# Patient Record
Sex: FEMALE | Race: BLACK OR AFRICAN AMERICAN | Employment: UNEMPLOYED | URBAN - METROPOLITAN AREA
[De-identification: names, ages, dates, MRNs, and addresses within clinical notes are randomized per-mention and may not be internally consistent; named-entity substitution may affect disease eponyms.]

---

## 2017-05-08 ENCOUNTER — HOSPITAL ENCOUNTER (OUTPATIENT)
Age: 37
Setting detail: OBSERVATION
Discharge: HOME | End: 2017-05-09
Attending: STUDENT IN AN ORGANIZED HEALTH CARE EDUCATION/TRAINING PROGRAM | Admitting: FAMILY MEDICINE
Payer: MEDICARE

## 2017-05-08 DIAGNOSIS — M79.10 MYALGIA: ICD-10-CM

## 2017-05-08 DIAGNOSIS — E83.51 HYPOCALCEMIA: Primary | ICD-10-CM

## 2017-05-08 LAB
ALBUMIN SERPL BCP-MCNC: 3 G/DL (ref 3.5–5)
ALBUMIN/GLOB SERPL: 0.6 {RATIO} (ref 1.1–2.2)
ALP SERPL-CCNC: 78 U/L (ref 45–117)
ALT SERPL-CCNC: 44 U/L (ref 12–78)
ANION GAP BLD CALC-SCNC: 14 MMOL/L (ref 5–15)
ANION GAP BLD CALC-SCNC: 15 MMOL/L (ref 5–15)
ARTERIAL PATENCY WRIST A: ABNORMAL
AST SERPL W P-5'-P-CCNC: 68 U/L (ref 15–37)
ATRIAL RATE: 99 BPM
BASE EXCESS BLD CALC-SCNC: 5 MMOL/L
BASOPHILS # BLD AUTO: 0 K/UL (ref 0–0.1)
BASOPHILS # BLD: 1 % (ref 0–1)
BDY SITE: ABNORMAL
BILIRUB SERPL-MCNC: 0.3 MG/DL (ref 0.2–1)
BUN SERPL-MCNC: 57 MG/DL (ref 6–20)
BUN SERPL-MCNC: 63 MG/DL (ref 6–20)
BUN/CREAT SERPL: 4 (ref 12–20)
BUN/CREAT SERPL: 4 (ref 12–20)
CA-I BLD-SCNC: 0.71 MMOL/L (ref 1.12–1.32)
CALCIUM SERPL-MCNC: 6.4 MG/DL (ref 8.5–10.1)
CALCIUM SERPL-MCNC: 6.5 MG/DL (ref 8.5–10.1)
CALCULATED P AXIS, ECG09: 40 DEGREES
CALCULATED R AXIS, ECG10: 29 DEGREES
CALCULATED T AXIS, ECG11: 20 DEGREES
CHLORIDE SERPL-SCNC: 93 MMOL/L (ref 97–108)
CHLORIDE SERPL-SCNC: 96 MMOL/L (ref 97–108)
CK MB CFR SERPL CALC: 0.1 % (ref 0–2.5)
CK MB SERPL-MCNC: 1.9 NG/ML (ref 5–25)
CK SERPL-CCNC: 1600 U/L (ref 26–192)
CK SERPL-CCNC: 1692 U/L (ref 26–192)
CO2 SERPL-SCNC: 26 MMOL/L (ref 21–32)
CO2 SERPL-SCNC: 27 MMOL/L (ref 21–32)
CREAT SERPL-MCNC: 15.8 MG/DL (ref 0.55–1.02)
CREAT SERPL-MCNC: 16.9 MG/DL (ref 0.55–1.02)
DIAGNOSIS, 93000: NORMAL
EOSINOPHIL # BLD: 0.3 K/UL (ref 0–0.4)
EOSINOPHIL NFR BLD: 6 % (ref 0–7)
ERYTHROCYTE [DISTWIDTH] IN BLOOD BY AUTOMATED COUNT: 17.8 % (ref 11.5–14.5)
GAS FLOW.O2 O2 DELIVERY SYS: ABNORMAL L/MIN
GLOBULIN SER CALC-MCNC: 5.1 G/DL (ref 2–4)
GLUCOSE SERPL-MCNC: 82 MG/DL (ref 65–100)
GLUCOSE SERPL-MCNC: 88 MG/DL (ref 65–100)
HCO3 BLD-SCNC: 31 MMOL/L (ref 22–26)
HCT VFR BLD AUTO: 27 % (ref 35–47)
HGB BLD-MCNC: 8.5 G/DL (ref 11.5–16)
INR PPP: 1.4 (ref 0.9–1.1)
LYMPHOCYTES # BLD AUTO: 18 % (ref 12–49)
LYMPHOCYTES # BLD: 1 K/UL (ref 0.8–3.5)
MAGNESIUM SERPL-MCNC: 1.6 MG/DL (ref 1.6–2.4)
MCH RBC QN AUTO: 29.4 PG (ref 26–34)
MCHC RBC AUTO-ENTMCNC: 31.5 G/DL (ref 30–36.5)
MCV RBC AUTO: 93.4 FL (ref 80–99)
MONOCYTES # BLD: 0.4 K/UL (ref 0–1)
MONOCYTES NFR BLD AUTO: 7 % (ref 5–13)
NEUTS SEG # BLD: 3.7 K/UL (ref 1.8–8)
NEUTS SEG NFR BLD AUTO: 68 % (ref 32–75)
P-R INTERVAL, ECG05: 130 MS
PCO2 BLD: 55.9 MMHG (ref 35–45)
PH BLD: 7.35 [PH] (ref 7.35–7.45)
PHOSPHATE SERPL-MCNC: 8.1 MG/DL (ref 2.6–4.7)
PLATELET # BLD AUTO: 291 K/UL (ref 150–400)
PO2 BLD: 24 MMHG (ref 80–100)
POTASSIUM SERPL-SCNC: 4.3 MMOL/L (ref 3.5–5.1)
POTASSIUM SERPL-SCNC: 4.5 MMOL/L (ref 3.5–5.1)
PROT SERPL-MCNC: 8.1 G/DL (ref 6.4–8.2)
PROTHROMBIN TIME: 14.3 SEC (ref 9–11.1)
Q-T INTERVAL, ECG07: 394 MS
QRS DURATION, ECG06: 70 MS
QTC CALCULATION (BEZET), ECG08: 505 MS
RBC # BLD AUTO: 2.89 M/UL (ref 3.8–5.2)
SAO2 % BLD: 39 % (ref 92–97)
SODIUM SERPL-SCNC: 135 MMOL/L (ref 136–145)
SODIUM SERPL-SCNC: 136 MMOL/L (ref 136–145)
SPECIMEN TYPE: ABNORMAL
TROPONIN I SERPL-MCNC: <0.04 NG/ML
VENTRICULAR RATE, ECG03: 99 BPM
WBC # BLD AUTO: 5.4 K/UL (ref 3.6–11)

## 2017-05-08 PROCEDURE — 74011000258 HC RX REV CODE- 258: Performed by: PHYSICIAN ASSISTANT

## 2017-05-08 PROCEDURE — 83735 ASSAY OF MAGNESIUM: CPT | Performed by: PHYSICIAN ASSISTANT

## 2017-05-08 PROCEDURE — 74011250637 HC RX REV CODE- 250/637: Performed by: NURSE PRACTITIONER

## 2017-05-08 PROCEDURE — 90945 DIALYSIS ONE EVALUATION: CPT

## 2017-05-08 PROCEDURE — 96372 THER/PROPH/DIAG INJ SC/IM: CPT

## 2017-05-08 PROCEDURE — 96365 THER/PROPH/DIAG IV INF INIT: CPT

## 2017-05-08 PROCEDURE — 99218 HC RM OBSERVATION: CPT

## 2017-05-08 PROCEDURE — 96366 THER/PROPH/DIAG IV INF ADDON: CPT

## 2017-05-08 PROCEDURE — A4722 DIALYS SOL FLD VOL > 1999CC: HCPCS | Performed by: INTERNAL MEDICINE

## 2017-05-08 PROCEDURE — 93005 ELECTROCARDIOGRAM TRACING: CPT

## 2017-05-08 PROCEDURE — 99285 EMERGENCY DEPT VISIT HI MDM: CPT

## 2017-05-08 PROCEDURE — 82803 BLOOD GASES ANY COMBINATION: CPT

## 2017-05-08 PROCEDURE — 74011250636 HC RX REV CODE- 250/636: Performed by: NURSE PRACTITIONER

## 2017-05-08 PROCEDURE — 80048 BASIC METABOLIC PNL TOTAL CA: CPT | Performed by: NURSE PRACTITIONER

## 2017-05-08 PROCEDURE — 74011250637 HC RX REV CODE- 250/637: Performed by: INTERNAL MEDICINE

## 2017-05-08 PROCEDURE — 85025 COMPLETE CBC W/AUTO DIFF WBC: CPT | Performed by: STUDENT IN AN ORGANIZED HEALTH CARE EDUCATION/TRAINING PROGRAM

## 2017-05-08 PROCEDURE — 84100 ASSAY OF PHOSPHORUS: CPT | Performed by: PHYSICIAN ASSISTANT

## 2017-05-08 PROCEDURE — 74011250637 HC RX REV CODE- 250/637: Performed by: FAMILY MEDICINE

## 2017-05-08 PROCEDURE — 82550 ASSAY OF CK (CPK): CPT | Performed by: PHYSICIAN ASSISTANT

## 2017-05-08 PROCEDURE — 36415 COLL VENOUS BLD VENIPUNCTURE: CPT | Performed by: NURSE PRACTITIONER

## 2017-05-08 PROCEDURE — 85610 PROTHROMBIN TIME: CPT | Performed by: NURSE PRACTITIONER

## 2017-05-08 PROCEDURE — 80053 COMPREHEN METABOLIC PANEL: CPT | Performed by: PHYSICIAN ASSISTANT

## 2017-05-08 PROCEDURE — 84484 ASSAY OF TROPONIN QUANT: CPT | Performed by: NURSE PRACTITIONER

## 2017-05-08 PROCEDURE — 74011000258 HC RX REV CODE- 258: Performed by: NURSE PRACTITIONER

## 2017-05-08 PROCEDURE — 74011250636 HC RX REV CODE- 250/636: Performed by: PHYSICIAN ASSISTANT

## 2017-05-08 PROCEDURE — 74011250636 HC RX REV CODE- 250/636: Performed by: INTERNAL MEDICINE

## 2017-05-08 PROCEDURE — 96367 TX/PROPH/DG ADDL SEQ IV INF: CPT

## 2017-05-08 PROCEDURE — G0257 UNSCHED DIALYSIS ESRD PT HOS: HCPCS

## 2017-05-08 PROCEDURE — 74011250637 HC RX REV CODE- 250/637: Performed by: PHYSICIAN ASSISTANT

## 2017-05-08 RX ORDER — LEVETIRACETAM 500 MG/1
1000 TABLET ORAL
Status: DISCONTINUED | OUTPATIENT
Start: 2017-05-08 | End: 2017-05-09 | Stop reason: HOSPADM

## 2017-05-08 RX ORDER — SEVELAMER CARBONATE 800 MG/1
800 TABLET, FILM COATED ORAL 3 TIMES DAILY
Status: DISCONTINUED | OUTPATIENT
Start: 2017-05-08 | End: 2017-05-09 | Stop reason: HOSPADM

## 2017-05-08 RX ORDER — WARFARIN SODIUM 5 MG/1
10 TABLET ORAL ONCE
Status: COMPLETED | OUTPATIENT
Start: 2017-05-08 | End: 2017-05-08

## 2017-05-08 RX ORDER — OXYCODONE AND ACETAMINOPHEN 5; 325 MG/1; MG/1
2 TABLET ORAL
Status: COMPLETED | OUTPATIENT
Start: 2017-05-08 | End: 2017-05-08

## 2017-05-08 RX ORDER — GENTAMICIN SULFATE 1 MG/G
CREAM TOPICAL DAILY
Status: DISCONTINUED | OUTPATIENT
Start: 2017-05-08 | End: 2017-05-09 | Stop reason: HOSPADM

## 2017-05-08 RX ORDER — SODIUM CHLORIDE 0.9 % (FLUSH) 0.9 %
5-10 SYRINGE (ML) INJECTION AS NEEDED
Status: DISCONTINUED | OUTPATIENT
Start: 2017-05-08 | End: 2017-05-09 | Stop reason: HOSPADM

## 2017-05-08 RX ORDER — LEVETIRACETAM 500 MG/1
500 TABLET ORAL
COMMUNITY
End: 2017-08-09

## 2017-05-08 RX ORDER — MAGNESIUM SULFATE 1 G/100ML
1 INJECTION INTRAVENOUS ONCE
Status: COMPLETED | OUTPATIENT
Start: 2017-05-08 | End: 2017-05-08

## 2017-05-08 RX ORDER — LANOLIN ALCOHOL/MO/W.PET/CERES
250 CREAM (GRAM) TOPICAL DAILY
COMMUNITY

## 2017-05-08 RX ORDER — LEVETIRACETAM 1000 MG/1
1000 TABLET ORAL
COMMUNITY

## 2017-05-08 RX ORDER — ERGOCALCIFEROL 1.25 MG/1
50000 CAPSULE ORAL DAILY
COMMUNITY

## 2017-05-08 RX ORDER — POTASSIUM CHLORIDE 20 MEQ/1
20 TABLET, EXTENDED RELEASE ORAL DAILY
COMMUNITY

## 2017-05-08 RX ORDER — CALCIUM GLUCONATE 94 MG/ML
1 INJECTION, SOLUTION INTRAVENOUS
Status: DISCONTINUED | OUTPATIENT
Start: 2017-05-08 | End: 2017-05-08 | Stop reason: CLARIF

## 2017-05-08 RX ORDER — SEVELAMER CARBONATE 800 MG/1
1600 TABLET, FILM COATED ORAL 3 TIMES DAILY
COMMUNITY

## 2017-05-08 RX ORDER — CALCIUM CHLORIDE INJECTION 100 MG/ML
1 INJECTION, SOLUTION INTRAVENOUS
Status: DISCONTINUED | OUTPATIENT
Start: 2017-05-08 | End: 2017-05-08

## 2017-05-08 RX ORDER — WARFARIN 10 MG/1
10 TABLET ORAL
COMMUNITY
End: 2017-08-09

## 2017-05-08 RX ORDER — CALCITRIOL 0.25 UG/1
0.5 CAPSULE ORAL 2 TIMES DAILY
Status: DISCONTINUED | OUTPATIENT
Start: 2017-05-08 | End: 2017-05-09 | Stop reason: HOSPADM

## 2017-05-08 RX ORDER — SODIUM CHLORIDE 0.9 % (FLUSH) 0.9 %
5-10 SYRINGE (ML) INJECTION EVERY 8 HOURS
Status: DISCONTINUED | OUTPATIENT
Start: 2017-05-08 | End: 2017-05-09 | Stop reason: HOSPADM

## 2017-05-08 RX ORDER — CALCIUM CARBONATE 200(500)MG
200 TABLET,CHEWABLE ORAL
Status: DISCONTINUED | OUTPATIENT
Start: 2017-05-08 | End: 2017-05-09 | Stop reason: HOSPADM

## 2017-05-08 RX ORDER — LEVETIRACETAM 500 MG/1
500 TABLET ORAL
Status: DISCONTINUED | OUTPATIENT
Start: 2017-05-08 | End: 2017-05-09 | Stop reason: HOSPADM

## 2017-05-08 RX ORDER — WARFARIN 10 MG/1
15 TABLET ORAL
COMMUNITY
End: 2017-08-09

## 2017-05-08 RX ORDER — WARFARIN SODIUM 5 MG/1
10 TABLET ORAL ONCE
Status: DISCONTINUED | OUTPATIENT
Start: 2017-05-08 | End: 2017-05-08 | Stop reason: ALTCHOICE

## 2017-05-08 RX ADMIN — SEVELAMER CARBONATE 800 MG: 800 TABLET, FILM COATED ORAL at 22:06

## 2017-05-08 RX ADMIN — CALCIUM GLUCONATE 1 G: 94 INJECTION, SOLUTION INTRAVENOUS at 04:54

## 2017-05-08 RX ADMIN — SODIUM CHLORIDE, SODIUM LACTATE, CALCIUM CHLORIDE, MAGNESIUM CHLORIDE AND DEXTROSE 2000 ML: 2.5; 538; 448; 18.3; 5.08 INJECTION, SOLUTION INTRAPERITONEAL at 22:07

## 2017-05-08 RX ADMIN — Medication 10 ML: at 06:17

## 2017-05-08 RX ADMIN — LEVETIRACETAM 500 MG: 500 TABLET ORAL at 07:33

## 2017-05-08 RX ADMIN — SODIUM CHLORIDE, SODIUM LACTATE, CALCIUM CHLORIDE, MAGNESIUM CHLORIDE AND DEXTROSE 2000 ML: 2.5; 538; 448; 18.3; 5.08 INJECTION, SOLUTION INTRAPERITONEAL at 18:43

## 2017-05-08 RX ADMIN — WARFARIN SODIUM 10 MG: 5 TABLET ORAL at 20:56

## 2017-05-08 RX ADMIN — SEVELAMER CARBONATE 800 MG: 800 TABLET, FILM COATED ORAL at 17:09

## 2017-05-08 RX ADMIN — CALCIUM GLUCONATE 2 G: 94 INJECTION, SOLUTION INTRAVENOUS at 20:00

## 2017-05-08 RX ADMIN — SODIUM CHLORIDE, SODIUM LACTATE, CALCIUM CHLORIDE, MAGNESIUM CHLORIDE AND DEXTROSE 2000 ML: 2.5; 538; 448; 18.3; 5.08 INJECTION, SOLUTION INTRAPERITONEAL at 13:03

## 2017-05-08 RX ADMIN — Medication 10 ML: at 14:03

## 2017-05-08 RX ADMIN — EPOETIN ALFA 10000 UNITS: 10000 SOLUTION INTRAVENOUS; SUBCUTANEOUS at 11:17

## 2017-05-08 RX ADMIN — CALCITRIOL 0.5 MCG: 0.25 CAPSULE, LIQUID FILLED ORAL at 20:56

## 2017-05-08 RX ADMIN — CALCIUM CARBONATE (ANTACID) CHEW TAB 500 MG 200 MG: 500 CHEW TAB at 17:09

## 2017-05-08 RX ADMIN — Medication 10 ML: at 22:07

## 2017-05-08 RX ADMIN — CALCIUM CARBONATE (ANTACID) CHEW TAB 500 MG 200 MG: 500 CHEW TAB at 11:22

## 2017-05-08 RX ADMIN — OXYCODONE HYDROCHLORIDE AND ACETAMINOPHEN 2 TABLET: 5; 325 TABLET ORAL at 01:49

## 2017-05-08 RX ADMIN — CALCITRIOL 0.5 MCG: 0.25 CAPSULE, LIQUID FILLED ORAL at 14:04

## 2017-05-08 RX ADMIN — MAGNESIUM SULFATE HEPTAHYDRATE 1 G: 1 INJECTION, SOLUTION INTRAVENOUS at 14:02

## 2017-05-08 RX ADMIN — GENTAMICIN SULFATE: 1 CREAM TOPICAL at 14:00

## 2017-05-08 RX ADMIN — LEVETIRACETAM 1000 MG: 500 TABLET ORAL at 20:57

## 2017-05-08 NOTE — PROGRESS NOTES
Pharmacist Note  Warfarin Dosing  Consult provided for this 39 y. o.female to manage warfarin for history of DVT     INR Goal: 2 - 3    Home regimen/ tablet size: 15mg daily except for 10mg on tuesdays as reported by patient. Drugs that may increase INR: None  Drugs that may decrease INR: None  Other current anticoagulants/ drugs that may increase bleeding risk: None  Risk factors: None  Daily INR ordered: YES    Recent Labs      05/08/17   1239  05/08/17   0118   HGB   --   8.5*   INR  1.4*   --      Date               INR                  Dose  5/8  1.4                                                                                  Assessment/ Plan: Will order warfarin 10 mg PO x 1 dose. Note:  due to sub-therapeutic INR, there is question of if patient has been consistently taking warfarin. 10mg will allow anticoagulation and evaluation of future INR's with less risk for a supra-therapeutic INR / bleed. Discussed with IM pharmacy specialist.    Pharmacy will continue to monitor daily and adjust therapy as indicated. Please contact the pharmacist at  for outpatient recommendations if needed.

## 2017-05-08 NOTE — PROGRESS NOTES
Observation Progress Note  Greg Chase NP        NAME:  [place]  :  1980  MRN:  [id number]  Date of Service:  2017  12:55 PM    Assessment/Plan:       Muscle cramps with hypocalcemia   -Calcium 6.4, given IV calcium in ED  -nephrology following, pt has been having difficulty with taking Tums, pt to f/u with her PD unit to work on other oral calcium supplement  -continue PD per nephrology   -continue home Calcitriol and Tums     CKD on home PD  -continue PD regimen per nephrology    Elevated CK  -? Source, pt denies any seizure, reports last seizure was over a med ago  -recheck CK now    Seizures  -seizure precautions  -continue home Keppra    Chronic DVT/PE  -on Coumadin at home   -coumadin dosing per pharmacy   -check INR    Dispo: ? Discharge this afternoon vs tomorrow  Pending Labs: BMP, Troponin, CK  Pending Tests: none       Subjective:     HPI Summary:  39 yof with pmh of CKD on PD, seizures, and chronic DVT and PEs on Coumadin who presents with generalized muscle cramping. Review of Systems:  +rare leg cramp, reports much improved from yesterday. Denies any chest pain, recent seizures, falls, difficulty with urinating or eating, or any acute complaints. Objective:     VITALS:   VS reviewed since presentation. Most recent are:  Visit Vitals    BP 98/66 (BP 1 Location: Right arm, BP Patient Position: Sitting)    Pulse 97    Temp 98.2 °F (36.8 °C)    Resp 20    Ht 5' 1\" (1.549 m)    Wt 116.3 kg (256 lb 6.3 oz)    SpO2 97%    BMI 48.45 kg/m2     No intake or output data in the 24 hours ending 17 1255     PHYSICAL EXAM:  General: No acute distress, cooperative, pleasant   EENT: EOMI. Anicteric sclerae. Oral mucous moist, oropharynx benign  Resp: CTA bilaterally. No wheezing/rhonchi/rales. No accessory muscle use  CV: Regular rhythm, normal rate, no murmurs, gallops, rubs  GI: Soft, non distended, non tender.  normoactive bowel sounds,   Extremities: No edema, warm, 2+ pulses throughout  Neurologic: Moves all extremities. AAOx3, CN II-XII grossly intact  Psych: Good insight. Not anxious nor agitated.   Skin: Good Turgor, no rashes or ulcers LLQ abd PD site c/d/i          Lab Data Personally Reviewed:  x YES  NO     Medications list Personally Reviewed:  x YES  NO     Imaging/Procedures Reviewed:  x YES  NO       _______________________________________________________________________  Care Plan discussed with:  Patient/Family and Nurse Dr Romie Johnson    Total time spent coordinating care:  30 minutes    Ni Alcocer NP

## 2017-05-08 NOTE — CONSULTS
[facility] Consult Note        NAME: Suma Champion       :  1980       MRN:  [id number]     Date/Time: 2017    Risk of deterioration: low       Assessment:    Plan:  Hypocalcemia  ESRD on CCPD at home  HTN  Failed CRTX  Pt is supposed to be taking tums but states she \"can't swallow them\" and doesn't like to chew them. I have suggested she break them in half and try swallowing them as she needs a calcium based binder given her low calcium and elevated phos. CAPD ordered-2000 bags (her request) 2.5% dianeal  I asked her to call her PD unit and start working on an oral calcium supplement. She follows with dr. Laymond Severe at [facility]  Epo x1  Clean exit site/apply gent  Dc planning   Asked to see for PD needs. Pt has been on PD since -prior to this she was on HD due to HTN, received a CRTx in  that failed in -then back on dialysis. Pt can't swallow whole tums and doesn't like to chew them. Trying to get a different type of calcium chew thru her unit. Pt states no recent peritonitis but does have \"crust\" around her exit site-she uses gentamicin ointment for this. Subjective:     Chief Complaint:  Feels fine now    Review of Systems: see hpi/no n/v/cp/sob/f/c  (+) cramping-improved, no cloudy pd fluid or pain with dialysis    Objective:     VITALS:   Last 24hrs VS reviewed since prior progress note.  Most recent are:  Visit Vitals    /80 (BP 1 Location: Right arm, BP Patient Position: Sitting)    Pulse 95    Temp 97.5 °F (36.4 °C)    Resp 17    Ht 5' 1\" (1.549 m)    Wt 116.3 kg (256 lb 6.3 oz)    SpO2 100%    BMI 48.45 kg/m2     SpO2 Readings from Last 6 Encounters:   17 100%   12 99%   12 95%   11 100%   11 98%   11/19/10 97%        No intake or output data in the 24 hours ending 17 0945       PHYSICAL EXAM:    General   well developed, well nourished, appears stated age, in no acute distress  Respiratory   Clear To Auscultation bilaterally - no wheezes, rales, rhonchi, or crackles  Cardiology  Regular Rate and Rythmn  - no murmurs, rubs or gallops  Abdominal  Soft, non-tender, non-distended, positive bowel sounds, no hepatosplenomegaly and exit site with crust  Extremities  No clubbing, cyanosis, or edema. Pulses intact. Neurological  No focal neurological deficits noted              Lab Data Reviewed: (see below)    Medications Reviewed: (see below)    PMH/ reviewed - no change compared to H&P  ___________________________________________________  ___________________________________________________    Attending Physician: Jason Ken MD     ____________________________________________________  MEDICATIONS:  Current Facility-Administered Medications   Medication Dose Route Frequency    sodium chloride (NS) flush 5-10 mL  5-10 mL IntraVENous Q8H    sodium chloride (NS) flush 5-10 mL  5-10 mL IntraVENous PRN    levETIRAcetam (KEPPRA) tablet 500 mg  500 mg Oral 7am    levETIRAcetam (KEPPRA) tablet 1,000 mg  1,000 mg Oral QHS    peritoneal dialysis DEXTROSE 2.5% (2.5 mEq/L low calcium) solution 2,000 mL  2,000 mL IntraPERitoneal QID    calcium carbonate (TUMS) chewable tablet 200 mg [elemental]  200 mg Oral TID WITH MEALS    epoetin haylie (EPOGEN;PROCRIT) injection 10,000 Units  10,000 Units SubCUTAneous Q7D        LABS:  Recent Labs      05/08/17   0118   WBC  5.4   HGB  8.5*   HCT  27.0*   PLT  291     Recent Labs      05/08/17   0242   NA  135*   K  4.3   CL  93*   CO2  27   BUN  57*   CREA  15.80*   GLU  88   CA  6.4*   MG  1.6   PHOS  8.1*     Recent Labs      05/08/17 0242   SGOT  68*   ALT  44   AP  78   TBILI  0.3   TP  8.1   ALB  3.0*   GLOB  5.1*     No results for input(s): INR, PTP, APTT in the last 72 hours. No lab exists for component: INREXT   No results for input(s): FE, TIBC, PSAT, FERR in the last 72 hours. No results for input(s): PH, PCO2, PO2 in the last 72 hours.   Recent Labs      05/08/17 0242   CPK  1692*   CKNDX 0.1     Lab Results   Component Value Date/Time    Glucose (POC) 74 11/18/2010 11:21 PM

## 2017-05-08 NOTE — ROUTINE PROCESS
TRANSFER - OUT REPORT:    Verbal report given to [facility], RN (name) on Zuleika Fuentes  being transferred to [facility] (unit) for routine progression of care       Report consisted of patients Situation, Background, Assessment and   Recommendations(SBAR). Information from the following report(s) SBAR, Kardex and MAR was reviewed with the receiving nurse. Lines:   Peripheral IV 05/08/17 Right External jugular (Active)   Site Assessment Clean, dry, & intact 5/8/2017  5:52 AM   Phlebitis Assessment 0 5/8/2017  5:52 AM   Infiltration Assessment 0 5/8/2017  5:52 AM   Dressing Status Clean, dry, & intact 5/8/2017  5:52 AM   Dressing Type Elastic bandage 5/8/2017  5:52 AM        Opportunity for questions and clarification was provided.       Patient transported with:   Monitor  Registered Nurse

## 2017-05-08 NOTE — PROGRESS NOTES
TRANSFER - IN REPORT:    Verbal report received from Huber Michael, [facility] (name) on Yovannyin Jump  being received from ED (unit) for routine progression of care      Report consisted of patients Situation, Background, Assessment and   Recommendations(SBAR). Information from the following report(s) SBAR, Kardex, ED Summary, Procedure Summary, Intake/Output, MAR, Recent Results and Cardiac Rhythm NSR/ST was reviewed with the receiving nurse. Opportunity for questions and clarification was provided. Assessment completed upon patients arrival to unit and care assumed. Primary Nurse Galileo Wiley RN and Toy Clemens RN performed a dual skin assessment on this patient   Legs dry BLE but no breakdown.

## 2017-05-08 NOTE — ED TRIAGE NOTES
TRIAGE NOTE:  Patient arrives with c/o \"i'm having cramps all over my body, my abdomen, my side my back, legs, arms, and feet everywhere\". Patient reports symptoms started Wednesday. Patient does peritoneal dialysis.

## 2017-05-08 NOTE — H&P
History & Physical    Date of admission: 2017    Patient name: Gerry Blake  MRN: [id number]  YOB: 1980  Age: 39 y.o. Primary care provider:  Dasha Tian MD     Source of Information: patient,  ED and medical records                              Chief complaint:  Muscle and body aches    History of present illness  Gerry Blake is a 39 y.o.  female with past medical history of chronic kidney disease, s/p kidney transplant, hypertension, hyperlipidemia, and seizures presented to the ED from home with chief complaint of generalized body muscle pain/ myalgias. Patient noted onset of symptoms starting approximately five days ago, constant, aggravated wit movement, without specific alleviating factors, cramping, aching in character, moderate severity. Symptom reportedly progressively worsened tonight. There were no reports of new onset syncope, loss of consciousness, headache, neck pain, visual disturbance, numbness, paresthesias, focal weakness, chest pain, palpitations, abdominal pain, nausea, vomiting, diarrhea, calf pain, increased leg swelling/ edema, fever, chills, recent trauma, injuries or falls. On arrival in the ED, initial recorded vital signs were BP = 118/71, HR= 116, RR= 18, O2sat= 96% room air. Calcium = 6.4, albumin= 3.0, and ionized calcium= 0.71. Patient reported missing her nighttime peritoneal dialysis treatment. Orders were given to administer calcium gluconate 1 gram IV x 1 dose now. Patient is now seen for admission to the hospitalist service for continued evaluation and treatments.     Past Medical History:   Diagnosis Date    Chronic kidney disease     [facility] [address]     Seizures ([facility] [address]       Past Surgical History:   Procedure Laterality Date    BIOPSY      parathyroid    DELIVERY       HX OTHER SURGICAL      fistula and PD port in stomach  HX TRANSPLANT  [facility]     Prior to Admission medications    Medication Sig Start Date End Date Taking? Authorizing Provider   amLODIPine (NORVASC) 10 mg tablet Take 10 mg by mouth daily. Take one half pill a day   Indications: HYPERTENSION    Historical Provider   calcitRIOL (ROCALTROL) 0.5 mcg capsule Take 0.5 mcg by mouth two (2) times a day. Historical Provider   calcium carbonate (TUMS) 200 mg calcium (500 mg) Chew Take 1 Tab by mouth two (2) times a day. Historical Provider   carBAMazepine XR (TEGRETOL XR) 200 mg SR tablet Take 200 mg by mouth two (2) times a day. Taking 3 tablets in the AM and PM     Historical Provider   famotidine (PEPCID AC) 20 mg tablet Take 20 mg by mouth as needed. Historical Provider   magnesium oxide (MAG-OX) 400 mg tablet Take 400 mg by mouth three (3) times daily. Historical Provider   predniSONE (DELTASONE) 10 mg tablet Take 10 mg by mouth daily (with breakfast). Historical Provider   tacrolimus (PROGRAF) 5 mg capsule Take 5 mg by mouth two (2) times a day. Historical Provider   warfarin (COUMADIN) 1 mg tablet Take 12 mg by mouth daily. Historical Provider   pravastatin (PRAVACHOL) 20 mg tablet Take 20 mg by mouth nightly. Historical Provider   valGANciclovir (VALCYTE) 450 mg tablet Take 900 mg by mouth nightly. Historical Provider     Allergies   Allergen Reactions    Phoslo [Calcium Acetate] Swelling      FAMILY HISTORY:  No reported history of CAD/ stroke     Social history  Patient resides  x  Independently                Ambulates  x  Independently                 Alcohol history   x  None           Smoking history  x  None             History   Smoking Status    Never Smoker   Smokeless Tobacco    Never Used       Code status  x  Full code       Review of systems  I performed an 11 systems review; pertinent positives were as noted in HPI, otherwise negative.     Physical Examination   Visit Vitals    /67    Pulse (!) 101    Temp 97.8 °F (36.6 °C)    Resp 15    Ht 5' 1\" (1.549 m)    Wt 117.4 kg (258 lb 12.8 oz)    SpO2 95%    BMI 48.9 kg/m2          O2 Device: Room air    General:  Morbidly obese female in no acute respiratory distress   Head:  Normocephalic, without obvious abnormality, atraumatic   Eyes:  Conjunctivae/corneas clear. PERRL, EOMs intact   E/N/M/T: Nares normal. Septum midline. No nasal drainage or sinus tenderness  Tongue midline/ non-edematous  Clear oropharynx   Neck: Normal appearance and movements, symmetrical, trachea midline  No palpable adenopathy  No thyroid enlargement, tenderness or nodules  No carotid bruit   Normal JVD   Lungs:   Symmetrical chest expansion and respiratory effort  Clear to auscultation bilaterally   Chest wall:  No tenderness or deformity   Heart:  Regular rhythm   Sounds normal; no murmur, click, rub or gallop   Abdomen:   Soft, no tenderness  No rebound, guarding, or rigidity  Non-distended  Bowel sounds normal  No masses or hepatosplenomegaly  No hernias present   Back: No CVA tenderness   Extremities: Extremities normal, atraumatic  No cyanosis, clubbing or edema     Pulses 2+ radial/ 1+ DP bilateral pulses   Skin: No rashes or ulcers  Warm/ dry   Musculo-      skeletal: Gait not tested  No calf tenderness   Neuro: GCS 15. Moves all extremities x 4. No slurred speech. No facial droop. Sensation grossly intact.      Psych: Alert, oriented x 3           Data Review      24 Hour Results:  Recent Results (from the past 24 hour(s))   CBC WITH AUTOMATED DIFF    Collection Time: 05/08/17  1:18 AM   Result Value Ref Range    WBC 5.4 3.6 - 11.0 K/uL    RBC 2.89 (L) 3.80 - 5.20 M/uL    HGB 8.5 (L) 11.5 - 16.0 g/dL    HCT 27.0 (L) 35.0 - 47.0 %    MCV 93.4 80.0 - 99.0 FL    MCH 29.4 26.0 - 34.0 PG    MCHC 31.5 30.0 - 36.5 g/dL    RDW 17.8 (H) 11.5 - 14.5 %    PLATELET 758 126 - 213 K/uL    NEUTROPHILS 68 32 - 75 %    LYMPHOCYTES 18 12 - 49 %    MONOCYTES 7 5 - 13 % EOSINOPHILS 6 0 - 7 %    BASOPHILS 1 0 - 1 %    ABS. NEUTROPHILS 3.7 1.8 - 8.0 K/UL    ABS. LYMPHOCYTES 1.0 0.8 - 3.5 K/UL    ABS. MONOCYTES 0.4 0.0 - 1.0 K/UL    ABS. EOSINOPHILS 0.3 0.0 - 0.4 K/UL    ABS. BASOPHILS 0.0 0.0 - 0.1 K/UL   METABOLIC PANEL, COMPREHENSIVE    Collection Time: 05/08/17  2:42 AM   Result Value Ref Range    Sodium 135 (L) 136 - 145 mmol/L    Potassium 4.3 3.5 - 5.1 mmol/L    Chloride 93 (L) 97 - 108 mmol/L    CO2 27 21 - 32 mmol/L    Anion gap 15 5 - 15 mmol/L    Glucose 88 65 - 100 mg/dL    BUN 57 (H) 6 - 20 MG/DL    Creatinine 15.80 (H) 0.55 - 1.02 MG/DL    BUN/Creatinine ratio 4 (L) 12 - 20      GFR est AA 3 (L) >60 ml/min/1.73m2    GFR est non-AA 3 (L) >60 ml/min/1.73m2    Calcium 6.4 (LL) 8.5 - 10.1 MG/DL    Bilirubin, total 0.3 0.2 - 1.0 MG/DL    ALT (SGPT) 44 12 - 78 U/L    AST (SGOT) 68 (H) 15 - 37 U/L    Alk.  phosphatase 78 45 - 117 U/L    Protein, total 8.1 6.4 - 8.2 g/dL    Albumin 3.0 (L) 3.5 - 5.0 g/dL    Globulin 5.1 (H) 2.0 - 4.0 g/dL    A-G Ratio 0.6 (L) 1.1 - 2.2     CK W/ CKMB & INDEX    Collection Time: 05/08/17  2:42 AM   Result Value Ref Range    CK 1692 (H) 26 - 192 U/L    CK - MB 1.9 <3.6 NG/ML    CK-MB Index 0.1 0 - 2.5     MAGNESIUM    Collection Time: 05/08/17  2:42 AM   Result Value Ref Range    Magnesium 1.6 1.6 - 2.4 mg/dL   PHOSPHORUS    Collection Time: 05/08/17  2:42 AM   Result Value Ref Range    Phosphorus 8.1 (H) 2.6 - 4.7 MG/DL   POC EG7    Collection Time: 05/08/17  4:07 AM   Result Value Ref Range    Calcium, ionized (POC) 0.71 (LL) 1.12 - 1.32 mmol/L    pH (POC) 7.352 7.35 - 7.45      pCO2 (POC) 55.9 (H) 35.0 - 45.0 MMHG    pO2 (POC) 24 (LL) 80 - 100 MMHG    HCO3 (POC) 31.0 (H) 22 - 26 MMOL/L    Base excess (POC) 5 mmol/L    sO2 (POC) 39 (L) 92 - 97 %    Site OTHER      Device: ROOM AIR      Allens test (POC) N/A      Specimen type (POC) VENOUS BLOOD     EKG, 12 LEAD, INITIAL    Collection Time: 05/08/17  5:47 AM   Result Value Ref Range    Ventricular Rate 99 BPM    Atrial Rate 99 BPM    P-R Interval 130 ms    QRS Duration 70 ms    Q-T Interval 394 ms    QTC Calculation (Bezet) 505 ms    Calculated P Axis 40 degrees    Calculated R Axis 29 degrees    Calculated T Axis 20 degrees    Diagnosis       Normal sinus rhythm  Nonspecific T wave abnormality  When compared with ECG of 28-JUN-2011 01:00,  Nonspecific T wave abnormality, worse in Inferior leads  T wave inversion no longer evident in Lateral leads       Recent Labs      05/08/17   0118   WBC  5.4   HGB  8.5*   HCT  27.0*   PLT  291     Recent Labs      05/08/17   0242   NA  135*   K  4.3   CL  93*   CO2  27   GLU  88   BUN  57*   CREA  15.80*   CA  6.4*   MG  1.6   PHOS  8.1*   ALB  3.0*   TBILI  0.3   SGOT  68*   ALT  44         Assessment and Plan   1. Hypocalcemia        -  Admit to telemetry       -  Repeat calcium level post-calcium replacement       2. Generalized myalgias        -  Supportive cares    3. ESRD       -  Resume peritoneal dialysis       -  Consult nephrologist    4. Elevated LFT       -  Repeat CMP    5. S/p kidney transplant       -  Continue immunosuppressant therapy       -  Check Prograf level    6. Seizure disorder       -  Continue home medication    7. Anemia of chronic disease       -  Repeat CBC    8. VTE prophylaxis.         -  SCDs to BLEs       Signed by: Jarek Harrison MD    May 8, 2017 at 5:56 AM

## 2017-05-08 NOTE — PROGRESS NOTES
Rohan Cooper called lab about PT/INR and BMP. Asked for them to be placed in progress. They are to run BMP and will call back about the INR. 1640 - Call back from lab and INR will be in process. [facility] [facility] lab again and asked about result of BMP. Per lab it has not yet been received and they will start processing now. Will continue to monitor.

## 2017-05-08 NOTE — ED PROVIDER NOTES
HPI Comments: 40 yo female with hx of CKD, peritoneal dialysis and seizures here for evaluation of muscle/body aches. States symptoms over the past 5 days. Describes as \"cramping\". Pt does PD nightly. Denies fever, chills, CP, SOB, flank pain. Non smoker. Dr Patrice Treviño- Dialysis at [email or web address]    Patient is a 39 y.o. female presenting with general illness. The history is provided by the patient. Generalized Body Aches   This is a new problem. The current episode started more than 2 days ago. The problem occurs constantly. The problem has been gradually worsening. Pertinent negatives include no abdominal pain and no shortness of breath. Nothing aggravates the symptoms. Nothing relieves the symptoms. Past Medical History:   Diagnosis Date    Chronic kidney disease     [facility] [address]     Seizures ([facility] [address]        Past Surgical History:   Procedure Laterality Date    BIOPSY      parathyroid    DELIVERY       HX OTHER SURGICAL      fistula and PD port in stomach     HX TRANSPLANT  [facility]         History reviewed. No pertinent family history. Social History     Social History    Marital status: SINGLE     Spouse name: N/A    Number of children: N/A    Years of education: N/A     Occupational History    Not on file. Social History Main Topics    Smoking status: Never Smoker    Smokeless tobacco: Never Used    Alcohol use No    Drug use: No    Sexual activity: Not on file     Other Topics Concern    Not on file     Social History Narrative         ALLERGIES: Phoslo [calcium acetate]    Review of Systems   Constitutional: Negative for activity change and fever. HENT: Negative for facial swelling. Eyes: Negative for discharge. Respiratory: Negative for cough and shortness of breath. Cardiovascular: Negative for leg swelling. Gastrointestinal: Negative for abdominal distention and abdominal pain. Musculoskeletal: Positive for myalgias.    Skin: Negative for color change. Neurological: Negative for seizures and syncope. Psychiatric/Behavioral: Negative for behavioral problems. Vitals:    05/08/17 0039   BP: 118/71   Pulse: (!) 116   Resp: 18   Temp: 98.4 °F (36.9 °C)   SpO2: 96%   Weight: 117.4 kg (258 lb 12.8 oz)   Height: 5' 1\" (1.549 m)            Physical Exam   Constitutional: She is oriented to person, place, and time. She appears well-nourished. No distress. HENT:   Head: Normocephalic and atraumatic. Eyes: Pupils are equal, round, and reactive to light. Neck: Normal range of motion. Cardiovascular: Normal rate and regular rhythm. Pulmonary/Chest: Effort normal and breath sounds normal.   Abdominal: Soft. There is no tenderness. Musculoskeletal: Normal range of motion. She exhibits no edema or tenderness. Neurological: She is alert and oriented to person, place, and time. Skin: Skin is warm and dry. Psychiatric: She has a normal mood and affect. Nursing note and vitals reviewed. MDM  Number of Diagnoses or Management Options  Hypocalcemia:   Myalgia:      Amount and/or Complexity of Data Reviewed  Clinical lab tests: ordered and reviewed  Decide to obtain previous medical records or to obtain history from someone other than the patient: yes  Review and summarize past medical records: yes  Discuss the patient with other providers: yes      ED Course       Procedures    Patient has been reassessed. Reviewed labs, medications with patient. Low Ca levels noted and pt symptomatic; will order and admit. DARIN Jaffe    Discussed case with attending Physician Ankur Pacheco; in to see. Agrees with care and plan. DARIN Jaffe    Spoke to Dr Emmanuel Ibrahim; will admit.  DARIN Jaffe

## 2017-05-08 NOTE — IP AVS SNAPSHOT
[address] 
[phone] Patient: Gerry Blake MRN: [id number] XHI:[id number][birth year]33[birth year] You are allergic to the following Allergen Reactions Phoslo (Calcium Acetate) Swelling Recent Documentation Height Weight BMI OB Status Smoking Status 1.549 m 120 kg 49.99 kg/m2 Having regular periods Never Smoker Emergency Contacts Name Discharge Info Relation Home Work Mobile [address]  Parent [1] [phone] About your hospitalization You were admitted on: May 8, 2017 You last received care in the:  [facility] 4 IMCU 2 You were discharged on:  May 9, 2017 Unit phone number:  [phone] Why you were hospitalized Your primary diagnosis was:  Hypocalcemia Providers Seen During Your Hospitalizations Provider Role Specialty Primary office phone Eladia Jolley MD Attending Provider Emergency Medicine [phone] Eduardo Liriano MD Attending Provider [facility] [phone] Svitlana Ward MD Attending Provider Internal Medicine [phone] Eber Alexis MD Attending Provider Internal Medicine [phone] Your Primary Care Physician (PCP) Primary Care Physician Office Phone Office Fax [facility] [phone] Follow-up Information Follow up With Details Comments Contact Info Dasha Tian MD In 1 week Dr. Enma Colon office will be calling you with a follow up appt [facility] 63 [phone] Bessy Markham MD On 5/11/2017 at [facility], your [facility] Clinic on Thursday  [address] [place] [address] [facility] [address] 
[phone] [facility]  INR check on 5/12/17 Current Discharge Medication List  
  
CONTINUE these medications which have CHANGED Dose & Instructions Dispensing Information Comments Morning Noon Evening Bedtime * COUMADIN 10 mg tablet Generic drug:  warfarin What changed:  Another medication with the same name was removed. Continue taking this medication, and follow the directions you see here. Your last dose was: Your next dose is:    
   
   
 Dose:  10 mg Take 10 mg by mouth every Tuesday. Take 15mg (1.5 tabs) every day but Tuesday. Tuesday takes 10mg (1 tab) Refills:  0  
     
   
   
   
  
 * warfarin 10 mg tablet Commonly known as:  COUMADIN What changed:  Another medication with the same name was removed. Continue taking this medication, and follow the directions you see here. Your last dose was: Your next dose is:    
   
   
 Dose:  15 mg Take 15 mg by mouth six (6) days a week. Take 15 mg every day except Tuesday. On Tuesday takes 10 mg Refills:  0  
     
   
   
   
  
 * Notice: This list has 2 medication(s) that are the same as other medications prescribed for you. Read the directions carefully, and ask your doctor or other care provider to review them with you. CONTINUE these medications which have NOT CHANGED Dose & Instructions Dispensing Information Comments Morning Noon Evening Bedtime  
 calcitRIOL 0.5 mcg capsule Commonly known as:  ROCALTROL Your last dose was: Your next dose is:    
   
   
 Dose:  0.5 mcg Take 0.5 mcg by mouth two (2) times a day. Refills:  0  
     
   
   
   
  
 calcium carbonate 200 mg calcium (500 mg) Chew Commonly known as:  TUMS Your last dose was: Your next dose is:    
   
   
 Dose:  1 Tab Take 1 Tab by mouth four (4) times daily. Quantity:  120 Tab Refills:  1  
     
   
   
   
  
 * KEPPRA 500 mg tablet Generic drug:  levETIRAcetam  
   
Your last dose was: Your next dose is:    
   
   
 Dose:  500 mg Take 500 mg by mouth Daily (before breakfast). Refills:  0  
     
   
   
   
  
 * KEPPRA 1,000 mg tablet Generic drug:  levETIRAcetam  
   
Your last dose was: Your next dose is:    
   
   
 Dose:  1000 mg Take 1,000 mg by mouth nightly. Refills:  0 PEPCID AC 20 mg tablet Generic drug:  famotidine Your last dose was: Your next dose is:    
   
   
 Dose:  20 mg Take 20 mg by mouth daily as needed for Other (indigestion). Refills:  0  
     
   
   
   
  
 potassium chloride 20 mEq tablet Commonly known as:  K-DUR, KLOR-CON Your last dose was: Your next dose is:    
   
   
 Dose:  20 mEq Take 20 mEq by mouth daily. Refills:  0  
     
   
   
   
  
 RENVELA 800 mg Tab tab Generic drug:  sevelamer carbonate Your last dose was: Your next dose is:    
   
   
 Dose:  1600 mg Take 1,600 mg by mouth three (3) times daily. Refills:  0  
     
   
   
   
  
 VITAMIN B-12 500 mcg tablet Generic drug:  cyanocobalamin Your last dose was: Your next dose is:    
   
   
 Dose:  250 mcg Take 250 mcg by mouth daily. Refills:  0  
     
   
   
   
  
 VITAMIN D2 50,000 unit capsule Generic drug:  ergocalciferol Your last dose was: Your next dose is:    
   
   
 Dose:  93941 Units Take 50,000 Units by mouth daily. Refills:  0  
     
   
   
   
  
 * Notice: This list has 2 medication(s) that are the same as other medications prescribed for you. Read the directions carefully, and ask your doctor or other care provider to review them with you. Where to Get Your Medications Information on where to get these meds will be given to you by the nurse or doctor. ! Ask your nurse or doctor about these medications  
  calcium carbonate 200 mg calcium (500 mg) Hayley Bunting Discharge Instructions Please bring this form with you to show your primary care provider at your follow-up appointment. Primary care provider:  Dr. Mei Hall MD 
 
Discharging provider:  Ramon Howard MD 
 
You have been admitted to the hospital with the following diagnoses: 
· HYPOCALCEMIA/ MYALGIAS/ ESRD FOLLOW-UP CARE RECOMMENDATIONS: 
 
APPOINTMENTS: 
Follow-up Information Follow up With Details Comments Contact Info Mei Hall MD In 1 week Discharge follow up  [facility] 63 
[phone] Keila Erazo MD  at [facility], your [facility] Clinic on Thursday  [address] [place] [address] [facility] [address] 
[phone] FOLLOW-UP TESTS recommended: repeat Ca and albumin in 3-4 days PENDING TEST RESULTS: 
At the time of your discharge the following test results are still pending: none Please make sure you review these results with your outpatient follow-up provider(s). SYMPTOMS to watch for: chest pain, shortness of breath, fever, chills, nausea, vomiting, diarrhea, change in mentation, falling, weakness, bleeding. DIET/what to eat:  Renal Diet ACTIVITY:  Activity as tolerated WOUND CARE: none EQUIPMENT needed:  none What to do if new or unexpected symptoms occur? If you experience any of the above symptoms (or should other concerns or questions arise after discharge) please call your primary care physician. Return to the emergency room if you cannot get hold of your doctor. · It is very important that you keep your follow-up appointment(s). · Please bring discharge papers, medication list (and/or medication bottles) to your follow-up appointments for review by your outpatient provider(s). · Please check the list of medications and be sure it includes every medication (even non-prescription medications) that your provider wants you to take. · It is important that you take the medication exactly as they are prescribed.   
· Keep your medication in the bottles provided by the pharmacist and keep a list of the medication names, dosages, and times to be taken in your wallet. · Do not take other medications without consulting your doctor. · If you have any questions about your medications or other instructions, please talk to your nurse or care provider before you leave the hospital. 
 
I understand that if any problems occur once I am at home I am to contact my physician. These instructions were explained to me and I had the opportunity to ask questions. Hypocalcemia: Care Instructions Your Care Instructions Hypocalcemia means that the level of calcium in your blood is lower than it should be. Your doctor may have done tests to check your calcium levels because you had certain symptoms. These include tingling or twitching of your muscles. Your doctor may do more tests to find out why your calcium is low and to see how well your kidneys and other organs are working. Your doctor will also want to see how well your parathyroid gland is working. This gland controls calcium levels in your blood. You may have this problem because you are not getting enough calcium in your diet. Or your body may not be absorbing the calcium as it should. You may be able to get your calcium up to a safe level by taking supplements. If your levels are very low, your doctor may give you a calcium shot, possibly along with magnesium. You will probably also be given vitamin D, because you need it to absorb calcium. After your doctor has your calcium levels up, be sure to get plenty of calcium in your diet. If you have a kidney or parathyroid problem, you may need to keep taking extra calcium. Follow-up care is a key part of your treatment and safety. Be sure to make and go to all appointments, and call your doctor if you are having problems. It's also a good idea to know your test results and keep a list of the medicines you take. How can you care for yourself at home? · Take your medicines exactly as prescribed. Call your doctor if you think you are having a problem with your medicine. · Eat foods rich in calcium. These include yogurt, cheese, milk, and dark green vegetables. This is the best way to get the calcium you need. You can get vitamin D from eggs, fatty fish, cereal, and milk. · Talk to your doctor about taking a calcium plus vitamin D supplement. · Stay active. Regular weight-bearing exercise, such as walking, can help keep your bones strong. It can also improve your overall health. · Spend a small amount of time outside in the sun without sunscreen. The sun helps your body make vitamin D. Talk to your doctor first if you have had skin cancer or you are at high risk for skin cancer. When should you call for help? Call your doctor now or seek immediate medical care if: 
· You feel numb or have tingling in your fingers and hands or toes and feet. · You are confused or are having trouble remembering things. · You have muscle spasms or cramps. · Your heart seems to be speeding up and then slowing down or skipping beats. · You are feeling down or blue, or you are not enjoying things like you once did. You may be depressed, which is common in people with hypocalcemia. Depression can be treated. Watch closely for changes in your health, and be sure to contact your doctor if: 
· You do not get better as expected. Where can you learn more? Go to http://glenroy-balwinder.info/. Enter P520 in the search box to learn more about \"Hypocalcemia: Care Instructions. \" Current as of: July 28, 2016 Content Version: 11.2 © 8085-0914 [employer]. Care instructions adapted under license by [employer] (which disclaims liability or warranty for this information).  If you have questions about a medical condition or this instruction, always ask your healthcare professional. [employer] disclaims any warranty or liability for your use of this information. Discharge Orders None [employer] Announcement We are excited to announce that we are making your provider's discharge notes available to you in [employer]. You will see these notes when they are completed and signed by the physician that discharged you from your recent hospital stay. If you have any questions or concerns about any information you see in [employer], please call the Health Information Department where you were seen or reach out to your Primary Care Provider for more information about your plan of care. Introducing [facility]! Arlene Fernandez introduces [employer] patient portal. Now you can access parts of your medical record, email your doctor's office, and request medication refills online. 1. In your internet browser, go to [email or web address] 2. Click on the First Time User? Click Here link in the Sign In box. You will see the New Member Sign Up page. 3. Enter your [employer] Access Code exactly as it appears below. You will not need to use this code after youve completed the sign-up process. If you do not sign up before the expiration date, you must request a new code. · [employer] Access Code: [email or web address] Expires: 8/6/2017 12:34 AM 
 
4. Enter the last four digits of your Social Security Number (xxxx) and Date of Birth (mm/dd/yyyy) as indicated and click Submit. You will be taken to the next sign-up page. 5. Create a [employer] ID. This will be your [employer] login ID and cannot be changed, so think of one that is secure and easy to remember. 6. Create a [employer] password. You can change your password at any time. 7. Enter your Password Reset Question and Answer. This can be used at a later time if you forget your password. 8. Enter your e-mail address. You will receive e-mail notification when new information is available in [address] 9. Click Sign Up. You can now view and download portions of your medical record. 10. Click the Download Summary menu link to download a portable copy of your medical information. If you have questions, please visit the Frequently Asked Questions section of the [employer] website. Remember, [employer] is NOT to be used for urgent needs. For medical emergencies, dial 911. Now available from your iPhone and Android! General Information Please provide this summary of care documentation to your next provider. Patient Signature:  ____________________________________________________________ Date:  ____________________________________________________________  
  
Se Breath Provider Signature:  ____________________________________________________________ Date:  ____________________________________________________________

## 2017-05-08 NOTE — PROGRESS NOTES
Spoke with Stevie Concepcion NP about Ca of 6.5 from [phone] labs. Received order for calcium gluconate 2 grams IV once to infuse over 2 hours.

## 2017-05-08 NOTE — PROGRESS NOTES
Bedside and Verbal shift change report given to Gilbert Dye RN (oncoming nurse) by Tree Sales RN (offgoing nurse). Report included the following information SBAR, Kardex, MAR and Recent Results.

## 2017-05-09 VITALS
HEIGHT: 61 IN | WEIGHT: 264.55 LBS | RESPIRATION RATE: 17 BRPM | BODY MASS INDEX: 49.95 KG/M2 | OXYGEN SATURATION: 94 % | HEART RATE: 104 BPM | DIASTOLIC BLOOD PRESSURE: 79 MMHG | SYSTOLIC BLOOD PRESSURE: 123 MMHG | TEMPERATURE: 98.7 F

## 2017-05-09 LAB
ANION GAP BLD CALC-SCNC: 15 MMOL/L (ref 5–15)
BASOPHILS # BLD AUTO: 0.1 K/UL (ref 0–0.1)
BASOPHILS # BLD: 1 % (ref 0–1)
BUN SERPL-MCNC: 66 MG/DL (ref 6–20)
BUN/CREAT SERPL: 4 (ref 12–20)
CALCIUM SERPL-MCNC: 6.9 MG/DL (ref 8.5–10.1)
CHLORIDE SERPL-SCNC: 94 MMOL/L (ref 97–108)
CK SERPL-CCNC: 1443 U/L (ref 26–192)
CO2 SERPL-SCNC: 23 MMOL/L (ref 21–32)
CREAT SERPL-MCNC: 16.7 MG/DL (ref 0.55–1.02)
DIFFERENTIAL METHOD BLD: ABNORMAL
EOSINOPHIL # BLD: 0.3 K/UL (ref 0–0.4)
EOSINOPHIL NFR BLD: 5 % (ref 0–7)
ERYTHROCYTE [DISTWIDTH] IN BLOOD BY AUTOMATED COUNT: 17.7 % (ref 11.5–14.5)
GLUCOSE SERPL-MCNC: 81 MG/DL (ref 65–100)
HCT VFR BLD AUTO: 25.5 % (ref 35–47)
HGB BLD-MCNC: 7.7 G/DL (ref 11.5–16)
INR PPP: 1.4 (ref 0.9–1.1)
LYMPHOCYTES # BLD AUTO: 14 % (ref 12–49)
LYMPHOCYTES # BLD: 0.9 K/UL (ref 0.8–3.5)
MAGNESIUM SERPL-MCNC: 1.8 MG/DL (ref 1.6–2.4)
MCH RBC QN AUTO: 28.6 PG (ref 26–34)
MCHC RBC AUTO-ENTMCNC: 30.2 G/DL (ref 30–36.5)
MCV RBC AUTO: 94.8 FL (ref 80–99)
MONOCYTES # BLD: 0.7 K/UL (ref 0–1)
MONOCYTES NFR BLD AUTO: 11 % (ref 5–13)
NEUTS SEG # BLD: 4.1 K/UL (ref 1.8–8)
NEUTS SEG NFR BLD AUTO: 69 % (ref 32–75)
PHOSPHATE SERPL-MCNC: 8.5 MG/DL (ref 2.6–4.7)
PLATELET # BLD AUTO: 232 K/UL (ref 150–400)
POTASSIUM SERPL-SCNC: 4.4 MMOL/L (ref 3.5–5.1)
PROTHROMBIN TIME: 14.7 SEC (ref 9–11.1)
RBC # BLD AUTO: 2.69 M/UL (ref 3.8–5.2)
RBC MORPH BLD: ABNORMAL
SODIUM SERPL-SCNC: 132 MMOL/L (ref 136–145)
WBC # BLD AUTO: 6.1 K/UL (ref 3.6–11)

## 2017-05-09 PROCEDURE — 90945 DIALYSIS ONE EVALUATION: CPT

## 2017-05-09 PROCEDURE — 96366 THER/PROPH/DIAG IV INF ADDON: CPT

## 2017-05-09 PROCEDURE — 74011250636 HC RX REV CODE- 250/636: Performed by: HOSPITALIST

## 2017-05-09 PROCEDURE — 74011250637 HC RX REV CODE- 250/637: Performed by: FAMILY MEDICINE

## 2017-05-09 PROCEDURE — G0257 UNSCHED DIALYSIS ESRD PT HOS: HCPCS

## 2017-05-09 PROCEDURE — 99218 HC RM OBSERVATION: CPT

## 2017-05-09 PROCEDURE — 85025 COMPLETE CBC W/AUTO DIFF WBC: CPT | Performed by: FAMILY MEDICINE

## 2017-05-09 PROCEDURE — 74011250637 HC RX REV CODE- 250/637: Performed by: INTERNAL MEDICINE

## 2017-05-09 PROCEDURE — A4722 DIALYS SOL FLD VOL > 1999CC: HCPCS | Performed by: INTERNAL MEDICINE

## 2017-05-09 PROCEDURE — 74011250636 HC RX REV CODE- 250/636: Performed by: INTERNAL MEDICINE

## 2017-05-09 PROCEDURE — 83735 ASSAY OF MAGNESIUM: CPT | Performed by: INTERNAL MEDICINE

## 2017-05-09 PROCEDURE — 84100 ASSAY OF PHOSPHORUS: CPT | Performed by: INTERNAL MEDICINE

## 2017-05-09 PROCEDURE — 82550 ASSAY OF CK (CPK): CPT | Performed by: NURSE PRACTITIONER

## 2017-05-09 PROCEDURE — 36415 COLL VENOUS BLD VENIPUNCTURE: CPT | Performed by: FAMILY MEDICINE

## 2017-05-09 PROCEDURE — 74011000258 HC RX REV CODE- 258: Performed by: HOSPITALIST

## 2017-05-09 PROCEDURE — 74011250637 HC RX REV CODE- 250/637: Performed by: NURSE PRACTITIONER

## 2017-05-09 PROCEDURE — 80048 BASIC METABOLIC PNL TOTAL CA: CPT | Performed by: INTERNAL MEDICINE

## 2017-05-09 PROCEDURE — 85610 PROTHROMBIN TIME: CPT | Performed by: NURSE PRACTITIONER

## 2017-05-09 RX ORDER — WARFARIN SODIUM 5 MG/1
15 TABLET ORAL ONCE
Status: DISCONTINUED | OUTPATIENT
Start: 2017-05-09 | End: 2017-05-09 | Stop reason: HOSPADM

## 2017-05-09 RX ORDER — CALCIUM CARBONATE 200(500)MG
1 TABLET,CHEWABLE ORAL 4 TIMES DAILY
Qty: 120 TAB | Refills: 1 | Status: SHIPPED | OUTPATIENT
Start: 2017-05-09

## 2017-05-09 RX ADMIN — CALCIUM GLUCONATE 2 G: 94 INJECTION, SOLUTION INTRAVENOUS at 09:43

## 2017-05-09 RX ADMIN — CALCIUM CARBONATE (ANTACID) CHEW TAB 500 MG 200 MG: 500 CHEW TAB at 07:28

## 2017-05-09 RX ADMIN — SODIUM CHLORIDE, SODIUM LACTATE, CALCIUM CHLORIDE, MAGNESIUM CHLORIDE AND DEXTROSE 2000 ML: 2.5; 538; 448; 18.3; 5.08 INJECTION, SOLUTION INTRAPERITONEAL at 08:57

## 2017-05-09 RX ADMIN — GENTAMICIN SULFATE: 1 CREAM TOPICAL at 09:43

## 2017-05-09 RX ADMIN — SEVELAMER CARBONATE 800 MG: 800 TABLET, FILM COATED ORAL at 09:00

## 2017-05-09 RX ADMIN — LEVETIRACETAM 500 MG: 500 TABLET ORAL at 07:32

## 2017-05-09 RX ADMIN — CALCITRIOL 0.5 MCG: 0.25 CAPSULE, LIQUID FILLED ORAL at 07:28

## 2017-05-09 RX ADMIN — CALCIUM CARBONATE (ANTACID) CHEW TAB 500 MG 200 MG: 500 CHEW TAB at 12:22

## 2017-05-09 RX ADMIN — Medication 10 ML: at 06:08

## 2017-05-09 RX ADMIN — SODIUM CHLORIDE, SODIUM LACTATE, CALCIUM CHLORIDE, MAGNESIUM CHLORIDE AND DEXTROSE 2000 ML: 2.5; 538; 448; 18.3; 5.08 INJECTION, SOLUTION INTRAPERITONEAL at 12:22

## 2017-05-09 NOTE — PROGRESS NOTES
[facility] Progress Note        NAME: Meenakshi Hoyt       :  1980       MRN:  [id number]     Date/Time: 2017    Risk of deterioration: low       Assessment:    Plan:  Hypocalcemia  ESRD on CCPD at home  HTN  Anemia  Failed CRTX 2017 Calcium corrects to 7.7 for low albumin  Tolerating swallowing 1/2 tums at a time  Tolerating PD px-ok to dc from renal standpoint-pt goes to her pd clinic appointment on thurs  Epo  Elevated cpk--trending down       Subjective:     Chief Complaint:  Feels fine now    Review of Systems:cramping resolved    Objective:     VITALS:   Last 24hrs VS reviewed since prior progress note.  Most recent are:  Visit Vitals    /79 (BP 1 Location: Right arm, BP Patient Position: At rest;Sitting)    Pulse (!) 104    Temp 98.7 °F (37.1 °C)    Resp 17    Ht 5' 1\" (1.549 m)    Wt 120 kg (264 lb 8.8 oz)    SpO2 94%    BMI 49.99 kg/m2     SpO2 Readings from Last 6 Encounters:   17 94%   12 99%   12 95%   11 100%   11 98%   11/19/10 97%            Intake/Output Summary (Last 24 hours) at 17 0912  Last data filed at 17 2306   Gross per 24 hour   Intake             6100 ml   Output             6400 ml   Net             -300 ml          PHYSICAL EXAM:    General   well developed, well nourished, appears stated age, in no acute distress  Respiratory   Clear To Auscultation bilaterally   Cardiology  Regular Rate and Rythmn    Abdominal  Soft, non-tender, non-distended  Neurological  No focal neurological deficits noted              Lab Data Reviewed: (see below)    Medications Reviewed: (see below)    PMH/SH reviewed - no change compared to H&P  ___________________________________________________  ___________________________________________________    Attending Physician: Milagros Jacobo MD     ____________________________________________________  MEDICATIONS:  Current Facility-Administered Medications   Medication Dose Route Frequency    calcium gluconate 2 g in 0.9% sodium chloride 100 mL IVPB  2 g IntraVENous ONCE    warfarin (COUMADIN) tablet 15 mg  15 mg Oral ONCE    sodium chloride (NS) flush 5-10 mL  5-10 mL IntraVENous Q8H    sodium chloride (NS) flush 5-10 mL  5-10 mL IntraVENous PRN    levETIRAcetam (KEPPRA) tablet 500 mg  500 mg Oral 7am    levETIRAcetam (KEPPRA) tablet 1,000 mg  1,000 mg Oral QHS    peritoneal dialysis DEXTROSE 2.5% (2.5 mEq/L low calcium) solution 2,000 mL  2,000 mL IntraPERitoneal QID    calcium carbonate (TUMS) chewable tablet 200 mg [elemental]  200 mg Oral TID WITH MEALS    epoetin ahylie (EPOGEN;PROCRIT) injection 10,000 Units  10,000 Units SubCUTAneous Q7D    gentamicin (GARAMYCIN) 0.1 % cream   Topical DAILY    sevelamer carbonate (RENVELA) tab 800 mg  800 mg Oral TID    calcitRIOL (ROCALTROL) capsule 0.5 mcg  0.5 mcg Oral BID    Warfarin dosing per pharmacy   Other Rx Dosing/Monitoring        LABS:  Recent Labs      05/09/17   0447 05/08/17   0118   WBC  6.1  5.4   HGB  7.7*  8.5*   HCT  25.5*  27.0*   PLT  232  291     Recent Labs      05/09/17   0447  05/08/17   1239  05/08/17   0242   NA  132*  136  135*   K  4.4  4.5  4.3   CL  94*  96*  93*   CO2  23  26  27   BUN  66*  63*  57*   CREA  16.70*  16.90*  15.80*   GLU  81  82  88   CA  6.9*  6.5*  6.4*   MG  1.8   --   1.6   PHOS  8.5*   --   8.1*     Recent Labs      05/08/17   0242   SGOT  68*   ALT  44   AP  78   TBILI  0.3   TP  8.1   ALB  3.0*   GLOB  5.1*     Recent Labs      05/09/17   0605  05/08/17   1239   INR  1.4*  1.4*   PTP  14.7*  14.3*      No results for input(s): FE, TIBC, PSAT, FERR in the last 72 hours. No results for input(s): PH, PCO2, PO2 in the last 72 hours.   Recent Labs      05/09/17   0605  05/08/17   1239  05/08/17   0242   CPK  1443*  1600*  1692*   CKNDX   --    --   0.1   TROIQ   --   <0.04   --      Lab Results   Component Value Date/Time    Glucose (POC) 74 11/18/2010 11:21 PM

## 2017-05-09 NOTE — PROGRESS NOTES
Respiratory had to be called for an av stick to draw labs. After 2 attempts, the 3rd try was successful. Labs were drawn and sent to the lab. Bedside shift change report given to Jude Zarco RN (oncoming nurse) by Brea Salcedo RN (offgoing nurse). Report included the following information SBAR, Kardex, ED Summary, Procedure Summary, Intake/Output, MAR, Accordion, Recent Results, Med Rec Status and Cardiac Rhythm NSR/Sinus Tach.        Hourly rounding was done throughout this shift

## 2017-05-09 NOTE — DISCHARGE INSTRUCTIONS
Please bring this form with you to show your primary care provider at your follow-up appointment. Primary care provider:  Dr. Susy Orlando MD    Discharging provider:  Dante Mercer MD    You have been admitted to the hospital with the following diagnoses:  · HYPOCALCEMIA/ MYALGIAS/ ESRD    FOLLOW-UP CARE RECOMMENDATIONS:    APPOINTMENTS:  Follow-up Information     Follow up With Details Comments Contact Info    Susy Orlando MD In 1 week Discharge follow up  [address] [facility]      Demetrius Gagnon MD  at [facility], your PD Clinic on Thursday  [address] [facility]  [phone]              FOLLOW-UP TESTS recommended: repeat Ca and albumin in 3-4 days    PENDING TEST RESULTS:  At the time of your discharge the following test results are still pending: none  Please make sure you review these results with your outpatient follow-up provider(s). SYMPTOMS to watch for: chest pain, shortness of breath, fever, chills, nausea, vomiting, diarrhea, change in mentation, falling, weakness, bleeding. DIET/what to eat:  Renal Diet    ACTIVITY:  Activity as tolerated    WOUND CARE: none    EQUIPMENT needed:  none      What to do if new or unexpected symptoms occur? If you experience any of the above symptoms (or should other concerns or questions arise after discharge) please call your primary care physician. Return to the emergency room if you cannot get hold of your doctor. · It is very important that you keep your follow-up appointment(s). · Please bring discharge papers, medication list (and/or medication bottles) to your follow-up appointments for review by your outpatient provider(s). · Please check the list of medications and be sure it includes every medication (even non-prescription medications) that your provider wants you to take.     · It is important that you take the medication exactly as they are prescribed. · Keep your medication in the bottles provided by the pharmacist and keep a list of the medication names, dosages, and times to be taken in your wallet. · Do not take other medications without consulting your doctor. · If you have any questions about your medications or other instructions, please talk to your nurse or care provider before you leave the hospital.    I understand that if any problems occur once I am at home I am to contact my physician. These instructions were explained to me and I had the opportunity to ask questions. Hypocalcemia: Care Instructions  Your Care Instructions  Hypocalcemia means that the level of calcium in your blood is lower than it should be. Your doctor may have done tests to check your calcium levels because you had certain symptoms. These include tingling or twitching of your muscles. Your doctor may do more tests to find out why your calcium is low and to see how well your kidneys and other organs are working. Your doctor will also want to see how well your parathyroid gland is working. This gland controls calcium levels in your blood. You may have this problem because you are not getting enough calcium in your diet. Or your body may not be absorbing the calcium as it should. You may be able to get your calcium up to a safe level by taking supplements. If your levels are very low, your doctor may give you a calcium shot, possibly along with magnesium. You will probably also be given vitamin D, because you need it to absorb calcium. After your doctor has your calcium levels up, be sure to get plenty of calcium in your diet. If you have a kidney or parathyroid problem, you may need to keep taking extra calcium. Follow-up care is a key part of your treatment and safety. Be sure to make and go to all appointments, and call your doctor if you are having problems.  It's also a good idea to know your test results and keep a list of the medicines you take.  How can you care for yourself at home? · Take your medicines exactly as prescribed. Call your doctor if you think you are having a problem with your medicine. · Eat foods rich in calcium. These include yogurt, cheese, milk, and dark green vegetables. This is the best way to get the calcium you need. You can get vitamin D from eggs, fatty fish, cereal, and milk. · Talk to your doctor about taking a calcium plus vitamin D supplement. · Stay active. Regular weight-bearing exercise, such as walking, can help keep your bones strong. It can also improve your overall health. · Spend a small amount of time outside in the sun without sunscreen. The sun helps your body make vitamin D. Talk to your doctor first if you have had skin cancer or you are at high risk for skin cancer. When should you call for help? Call your doctor now or seek immediate medical care if:  · You feel numb or have tingling in your fingers and hands or toes and feet. · You are confused or are having trouble remembering things. · You have muscle spasms or cramps. · Your heart seems to be speeding up and then slowing down or skipping beats. · You are feeling down or blue, or you are not enjoying things like you once did. You may be depressed, which is common in people with hypocalcemia. Depression can be treated. Watch closely for changes in your health, and be sure to contact your doctor if:  · You do not get better as expected. Where can you learn more? Go to http://glenroy-balwinder.info/. Enter P520 in the search box to learn more about \"Hypocalcemia: Care Instructions. \"  Current as of: July 28, 2016  Content Version: 11.2  © 8995-7752 [employer]. Care instructions adapted under license by [employer] (which disclaims liability or warranty for this information).  If you have questions about a medical condition or this instruction, always ask your healthcare professional. [employer] disclaims any warranty or liability for your use of this information.

## 2017-05-09 NOTE — DISCHARGE SUMMARY
Discharge Summary     Patient:  Darren Webb       MRN: [id number]       YOB: 1980       Age: 39 y.o. Date of admission:  5/8/2017    Date of discharge:  5/9/2017    Primary care provider: Dr. Karlee Pinto MD    Admitting provider:  Sunny Jonhston MD    Discharging provider:  Noreen Calles MD - [phone]  If unavailable, call [phone] and ask the  to page the triage hospitalist.    Consultations  · IP CONSULT TO HOSPITALIST  · IP CONSULT TO NEPHROLOGY    Procedures  · * No surgery found *    Discharge destination: HOME. The patient is stable for discharge. Admission diagnosis  · HYPOCALCEMIA/ MYALGIAS/ ESRD    Current Discharge Medication List      CONTINUE these medications which have CHANGED    Details   calcium carbonate (TUMS) 200 mg calcium (500 mg) chew Take 1 Tab by mouth four (4) times daily. Qty: 120 Tab, Refills: 1         CONTINUE these medications which have NOT CHANGED    Details   !! levETIRAcetam (KEPPRA) 500 mg tablet Take 500 mg by mouth Daily (before breakfast). !! levETIRAcetam (KEPPRA) 1,000 mg tablet Take 1,000 mg by mouth nightly.      ergocalciferol (VITAMIN D2) 50,000 unit capsule Take 50,000 Units by mouth daily. !! warfarin (COUMADIN) 10 mg tablet Take 10 mg by mouth every Tuesday. Take 15mg (1.5 tabs) every day but Tuesday. Tuesday takes 10mg (1 tab)       cyanocobalamin (VITAMIN B-12) 500 mcg tablet Take 250 mcg by mouth daily. sevelamer carbonate (RENVELA) 800 mg tab tab Take 1,600 mg by mouth three (3) times daily. !! warfarin (COUMADIN) 10 mg tablet Take 15 mg by mouth six (6) days a week. Take 15 mg every day except Tuesday. On Tuesday takes 10 mg      potassium chloride (K-DUR, KLOR-CON) 20 mEq tablet Take 20 mEq by mouth daily. calcitRIOL (ROCALTROL) 0.5 mcg capsule Take 0.5 mcg by mouth two (2) times a day. famotidine (PEPCID AC) 20 mg tablet Take 20 mg by mouth daily as needed for Other (indigestion). !! - Potential duplicate medications found. Please discuss with provider. Follow-up Information     Follow up With Details Comments [address] [facility] Wallace Fonseca MD In 1 week Discharge follow up  [address] Liset Mullen MD  at [facility], your PD Clinic on Thursday  [address] [facility]  [phone]      [facility]  INR check on 5/12/17           Final discharge diagnoses and brief hospital course  Please also refer to the admission H&P for details on the presenting problem. 38 yo female with hx of CKD, peritoneal dialysis and seizures here for evaluation of muscle/body aches. States symptoms over the past 5 days. Describes as \"cramping\".      Muscle cramps with hypocalcemia   -Calcium 6.4, given IV calcium, Correct Ca at discharge 7.78  -nephrology following, pt has been having difficulty with taking Tums, pt to f/u with her PD unit to work on other oral calcium supplement  -continue PD per nephrology   -continue home Calcitriol and Tums      CKD on home PD  -continue PD regimen per nephrology     Elevated CK  -CK trending down, unclear etiology     Seizures  -seizure precautions  -continue home Keppra     Chronic DVT/PE  -on Coumadin at home   -check INR at Coumadin clinic at [address] [place] [address] recommended: repeat Ca and albumin in 3-4 days     PENDING TEST RESULTS:  At the time of your discharge the following test results are still pending: none  Please make sure you review these results with your outpatient follow-up provider(s).     SYMPTOMS to watch for: chest pain, shortness of breath, fever, chills, nausea, vomiting, diarrhea, change in mentation, falling, weakness, bleeding.     DIET/what to eat: Renal Diet     ACTIVITY: Activity as tolerated     WOUND CARE: none     EQUIPMENT needed: none    Physical examination at discharge  Visit Vitals    /79 (BP 1 Location: Right arm, BP Patient Position: At rest;Sitting)    Pulse (!) 104    Temp 98.7 °F (37.1 °C)    Resp 17    Ht 5' 1\" (1.549 m)    Wt 120 kg (264 lb 8.8 oz)    SpO2 94%    BMI 49.99 kg/m2     AO3  PERRLA  EOMI  Lung: CTA  CVS: RRR  Abd: soft NT ND, +PD cath  Ext: no edema    Pertinent imaging studies:    Recent Labs      05/09/17   0447  05/08/17   0118   WBC  6.1  5.4   HGB  7.7*  8.5*   HCT  25.5*  27.0*   PLT  232  291     Recent Labs      05/09/17   0447  05/08/17   1239  05/08/17   0242   NA  132*  136  135*   K  4.4  4.5  4.3   CL  94*  96*  93*   CO2  23  26  27   BUN  66*  63*  57*   CREA  16.70*  16.90*  15.80*   GLU  81  82  88   CA  6.9*  6.5*  6.4*   MG  1.8   --   1.6   PHOS  8.5*   --   8.1*     Recent Labs      05/08/17   0242   SGOT  68*   AP  78   TP  8.1   ALB  3.0*   GLOB  5.1*     Recent Labs      05/09/17   0605  05/08/17   1239   INR  1.4*  1.4*   PTP  14.7*  14.3*      No results for input(s): FE, TIBC, PSAT, FERR in the last 72 hours. No results for input(s): PH, PCO2, PO2 in the last 72 hours.   Recent Labs      05/09/17   0605  05/08/17   1239  05/08/17   0242   CPK  1443*  1600*  1692*   CKMB   --    --   1.9     No components found for: GLPOC    Chronic Diagnoses:    Problem List as of 5/9/2017  Date Reviewed: 5/8/2017          Codes Class Noted - Resolved    * (Principal)Hypocalcemia ICD-10-CM: E83.51  ICD-9-CM: 275.41  5/8/2017 - Present        UTI (lower urinary tract infection) ICD-10-CM: N39.0  ICD-9-CM: 599.0  8/11/2012 - Present        Hypotension, unspecified ICD-10-CM: I95.9  ICD-9-CM: 458.9  8/11/2012 - Present        ESRD on peritoneal dialysis [facility]) ICD-10-CM: N18.6, Z99.2  ICD-9-CM: 585.6, V45.11  6/29/2011 - Present        Chest pain, unspecified ICD-10-CM: R07.9  ICD-9-CM: 786.50  6/28/2011 - Present              Time spent on discharge related activities today greater than 30 minutes.       Signed:  Breanna Jensen MD                 Hospitalist, Internal Medicine      Cc: Kellie Kern., MD

## 2017-05-09 NOTE — PROGRESS NOTES
Pharmacist Note  Warfarin Dosing  Consult provided for this 39 y. o.female to manage warfarin for history of DVT     INR Goal: 2 - 3    Home regimen/ tablet size: 15 mg daily except for 10 mg on Tuesdays as reported by patient. Drugs that may increase INR: None  Drugs that may decrease INR: None  Other current anticoagulants/ drugs that may increase bleeding risk: None  Risk factors: None  Daily INR ordered: YES    Recent Labs      05/09/17   0605  05/09/17   0447  05/08/17   1239  05/08/17   0118   HGB   --   7.7*   --   8.5*   INR  1.4*   --   1.4*   --      Date               INR                  Dose  5/8                  1.4                   10 mg  5/9                  1.4                   15 mg                                                                                  Assessment/ Plan:  INR remains subtherapeutic. Will give 15 mg PO x1 today. Pharmacy will continue to monitor daily and adjust therapy as indicated. Please contact the pharmacist at  or  for outpatient recommendations if needed. Faisal Arevalo, Pharm. D., BCPS

## 2017-08-09 ENCOUNTER — APPOINTMENT (OUTPATIENT)
Dept: CT IMAGING | Age: 37
End: 2017-08-09
Attending: EMERGENCY MEDICINE
Payer: MEDICARE

## 2017-08-09 ENCOUNTER — APPOINTMENT (OUTPATIENT)
Dept: GENERAL RADIOLOGY | Age: 37
End: 2017-08-09
Attending: EMERGENCY MEDICINE
Payer: MEDICARE

## 2017-08-09 ENCOUNTER — APPOINTMENT (OUTPATIENT)
Dept: ULTRASOUND IMAGING | Age: 37
End: 2017-08-09
Attending: HOSPITALIST
Payer: MEDICARE

## 2017-08-09 ENCOUNTER — HOSPITAL ENCOUNTER (OUTPATIENT)
Age: 37
Setting detail: OBSERVATION
Discharge: HOME | End: 2017-08-10
Attending: EMERGENCY MEDICINE | Admitting: HOSPITALIST
Payer: MEDICARE

## 2017-08-09 DIAGNOSIS — R79.1 ELEVATED INR: ICD-10-CM

## 2017-08-09 DIAGNOSIS — Z99.2 ESRD ON PERITONEAL DIALYSIS (HCC): Primary | ICD-10-CM

## 2017-08-09 DIAGNOSIS — N18.6 ESRD ON PERITONEAL DIALYSIS (HCC): Primary | ICD-10-CM

## 2017-08-09 DIAGNOSIS — S70.12XA HEMATOMA OF LEFT THIGH, INITIAL ENCOUNTER: ICD-10-CM

## 2017-08-09 DIAGNOSIS — I95.0 IDIOPATHIC HYPOTENSION: ICD-10-CM

## 2017-08-09 DIAGNOSIS — R55 SYNCOPE, VASOVAGAL: ICD-10-CM

## 2017-08-09 PROBLEM — M25.552 BILATERAL HIP PAIN: Status: ACTIVE | Noted: 2017-08-09

## 2017-08-09 PROBLEM — D72.829 LEUKOCYTOSIS: Status: ACTIVE | Noted: 2017-08-09

## 2017-08-09 PROBLEM — I95.9 HYPOTENSION: Status: ACTIVE | Noted: 2017-08-09

## 2017-08-09 PROBLEM — R57.9 SHOCK (HCC): Status: ACTIVE | Noted: 2017-08-09

## 2017-08-09 PROBLEM — M25.551 BILATERAL HIP PAIN: Status: ACTIVE | Noted: 2017-08-09

## 2017-08-09 PROBLEM — R00.0 TACHYCARDIA: Status: ACTIVE | Noted: 2017-08-09

## 2017-08-09 LAB
ALBUMIN SERPL BCP-MCNC: 2.1 G/DL (ref 3.5–5)
ALBUMIN/GLOB SERPL: 0.3 {RATIO} (ref 1.1–2.2)
ALP SERPL-CCNC: 74 U/L (ref 45–117)
ALT SERPL-CCNC: 27 U/L (ref 12–78)
ANION GAP BLD CALC-SCNC: 15 MMOL/L (ref 5–15)
ANION GAP BLD CALC-SCNC: 17 MMOL/L (ref 5–15)
APTT PPP: 56.7 SEC (ref 22.1–32.5)
AST SERPL W P-5'-P-CCNC: 40 U/L (ref 15–37)
ATRIAL RATE: 104 BPM
ATRIAL RATE: 119 BPM
BASOPHILS # BLD AUTO: 0 K/UL (ref 0–0.1)
BASOPHILS # BLD: 0 % (ref 0–1)
BILIRUB SERPL-MCNC: 0.3 MG/DL (ref 0.2–1)
BUN BLD-MCNC: 50 MG/DL (ref 9–20)
BUN SERPL-MCNC: 34 MG/DL (ref 6–20)
BUN/CREAT SERPL: 2 (ref 12–20)
CA-I BLD-MCNC: 0.92 MMOL/L (ref 1.12–1.32)
CALCIUM SERPL-MCNC: 8.5 MG/DL (ref 8.5–10.1)
CALCULATED P AXIS, ECG09: 43 DEGREES
CALCULATED R AXIS, ECG10: 20 DEGREES
CALCULATED R AXIS, ECG10: 23 DEGREES
CALCULATED T AXIS, ECG11: 55 DEGREES
CALCULATED T AXIS, ECG11: 57 DEGREES
CHLORIDE BLD-SCNC: 95 MMOL/L (ref 98–107)
CHLORIDE SERPL-SCNC: 96 MMOL/L (ref 97–108)
CK MB CFR SERPL CALC: NORMAL % (ref 0–2.5)
CK MB SERPL-MCNC: <1 NG/ML (ref 5–25)
CK SERPL-CCNC: 164 U/L (ref 26–192)
CO2 BLD-SCNC: 31 MMOL/L (ref 21–32)
CO2 SERPL-SCNC: 27 MMOL/L (ref 21–32)
CREAT BLD-MCNC: 15.1 MG/DL (ref 0.6–1.3)
CREAT SERPL-MCNC: 16.2 MG/DL (ref 0.55–1.02)
DIAGNOSIS, 93000: NORMAL
DIAGNOSIS, 93000: NORMAL
DIFFERENTIAL METHOD BLD: ABNORMAL
EOSINOPHIL # BLD: 0 K/UL (ref 0–0.4)
EOSINOPHIL NFR BLD: 0 % (ref 0–7)
ERYTHROCYTE [DISTWIDTH] IN BLOOD BY AUTOMATED COUNT: 18.3 % (ref 11.5–14.5)
GLOBULIN SER CALC-MCNC: 6.3 G/DL (ref 2–4)
GLUCOSE BLD-MCNC: 102 MG/DL (ref 65–100)
GLUCOSE SERPL-MCNC: 100 MG/DL (ref 65–100)
HCG SERPL QL: NEGATIVE
HCT VFR BLD AUTO: 30.2 % (ref 35–47)
HCT VFR BLD CALC: 32 % (ref 35–47)
HGB BLD-MCNC: 10.9 GM/DL (ref 11.5–16)
HGB BLD-MCNC: 9.2 G/DL (ref 11.5–16)
INR PPP: 4 (ref 0.9–1.1)
LACTATE SERPL-SCNC: 1.2 MMOL/L (ref 0.4–2)
LYMPHOCYTES # BLD AUTO: 10 % (ref 12–49)
LYMPHOCYTES # BLD: 1.2 K/UL (ref 0.8–3.5)
MAGNESIUM SERPL-MCNC: 1.7 MG/DL (ref 1.6–2.4)
MCH RBC QN AUTO: 29.5 PG (ref 26–34)
MCHC RBC AUTO-ENTMCNC: 30.5 G/DL (ref 30–36.5)
MCV RBC AUTO: 96.8 FL (ref 80–99)
MONOCYTES # BLD: 1.2 K/UL (ref 0–1)
MONOCYTES NFR BLD AUTO: 10 % (ref 5–13)
NEUTS SEG # BLD: 9.2 K/UL (ref 1.8–8)
NEUTS SEG NFR BLD AUTO: 80 % (ref 32–75)
P-R INTERVAL, ECG05: 162 MS
PLATELET # BLD AUTO: 380 K/UL (ref 150–400)
POTASSIUM BLD-SCNC: 3.8 MMOL/L (ref 3.5–5.1)
POTASSIUM SERPL-SCNC: 3.7 MMOL/L (ref 3.5–5.1)
PROT SERPL-MCNC: 8.4 G/DL (ref 6.4–8.2)
PROTHROMBIN TIME: 40.1 SEC (ref 9–11.1)
Q-T INTERVAL, ECG07: 372 MS
Q-T INTERVAL, ECG07: 442 MS
QRS DURATION, ECG06: 74 MS
QRS DURATION, ECG06: 76 MS
QTC CALCULATION (BEZET), ECG08: 489 MS
QTC CALCULATION (BEZET), ECG08: 621 MS
RBC # BLD AUTO: 3.12 M/UL (ref 3.8–5.2)
RBC MORPH BLD: ABNORMAL
SERVICE CMNT-IMP: ABNORMAL
SODIUM BLD-SCNC: 138 MMOL/L (ref 136–145)
SODIUM SERPL-SCNC: 138 MMOL/L (ref 136–145)
THERAPEUTIC RANGE,PTTT: ABNORMAL SECS (ref 58–77)
TROPONIN I SERPL-MCNC: <0.04 NG/ML
VENTRICULAR RATE, ECG03: 104 BPM
VENTRICULAR RATE, ECG03: 119 BPM
WBC # BLD AUTO: 11.6 K/UL (ref 3.6–11)

## 2017-08-09 PROCEDURE — 96360 HYDRATION IV INFUSION INIT: CPT

## 2017-08-09 PROCEDURE — 74011250637 HC RX REV CODE- 250/637: Performed by: FAMILY MEDICINE

## 2017-08-09 PROCEDURE — 72193 CT PELVIS W/DYE: CPT

## 2017-08-09 PROCEDURE — 83735 ASSAY OF MAGNESIUM: CPT | Performed by: EMERGENCY MEDICINE

## 2017-08-09 PROCEDURE — 80177 DRUG SCRN QUAN LEVETIRACETAM: CPT | Performed by: EMERGENCY MEDICINE

## 2017-08-09 PROCEDURE — 76705 ECHO EXAM OF ABDOMEN: CPT

## 2017-08-09 PROCEDURE — 74011000258 HC RX REV CODE- 258: Performed by: EMERGENCY MEDICINE

## 2017-08-09 PROCEDURE — 65660000000 HC RM CCU STEPDOWN

## 2017-08-09 PROCEDURE — 71010 XR CHEST PORT: CPT

## 2017-08-09 PROCEDURE — 90945 DIALYSIS ONE EVALUATION: CPT

## 2017-08-09 PROCEDURE — 87205 SMEAR GRAM STAIN: CPT | Performed by: HOSPITALIST

## 2017-08-09 PROCEDURE — 74011250636 HC RX REV CODE- 250/636: Performed by: HOSPITALIST

## 2017-08-09 PROCEDURE — 74011250636 HC RX REV CODE- 250/636: Performed by: EMERGENCY MEDICINE

## 2017-08-09 PROCEDURE — 36415 COLL VENOUS BLD VENIPUNCTURE: CPT | Performed by: EMERGENCY MEDICINE

## 2017-08-09 PROCEDURE — 99285 EMERGENCY DEPT VISIT HI MDM: CPT

## 2017-08-09 PROCEDURE — 87040 BLOOD CULTURE FOR BACTERIA: CPT | Performed by: EMERGENCY MEDICINE

## 2017-08-09 PROCEDURE — 80047 BASIC METABLC PNL IONIZED CA: CPT

## 2017-08-09 PROCEDURE — 85610 PROTHROMBIN TIME: CPT | Performed by: EMERGENCY MEDICINE

## 2017-08-09 PROCEDURE — 85025 COMPLETE CBC W/AUTO DIFF WBC: CPT | Performed by: EMERGENCY MEDICINE

## 2017-08-09 PROCEDURE — 83605 ASSAY OF LACTIC ACID: CPT | Performed by: EMERGENCY MEDICINE

## 2017-08-09 PROCEDURE — 74011000250 HC RX REV CODE- 250: Performed by: HOSPITALIST

## 2017-08-09 PROCEDURE — 84703 CHORIONIC GONADOTROPIN ASSAY: CPT | Performed by: FAMILY MEDICINE

## 2017-08-09 PROCEDURE — 89050 BODY FLUID CELL COUNT: CPT | Performed by: HOSPITALIST

## 2017-08-09 PROCEDURE — 96361 HYDRATE IV INFUSION ADD-ON: CPT

## 2017-08-09 PROCEDURE — 82550 ASSAY OF CK (CPK): CPT | Performed by: EMERGENCY MEDICINE

## 2017-08-09 PROCEDURE — 74011636320 HC RX REV CODE- 636/320: Performed by: EMERGENCY MEDICINE

## 2017-08-09 PROCEDURE — 74011250636 HC RX REV CODE- 250/636: Performed by: INTERNAL MEDICINE

## 2017-08-09 PROCEDURE — A4722 DIALYS SOL FLD VOL > 1999CC: HCPCS | Performed by: INTERNAL MEDICINE

## 2017-08-09 PROCEDURE — 70450 CT HEAD/BRAIN W/O DYE: CPT

## 2017-08-09 PROCEDURE — 84484 ASSAY OF TROPONIN QUANT: CPT | Performed by: EMERGENCY MEDICINE

## 2017-08-09 PROCEDURE — C8929 TTE W OR WO FOL WCON,DOPPLER: HCPCS

## 2017-08-09 PROCEDURE — 93005 ELECTROCARDIOGRAM TRACING: CPT

## 2017-08-09 PROCEDURE — 80053 COMPREHEN METABOLIC PANEL: CPT | Performed by: EMERGENCY MEDICINE

## 2017-08-09 PROCEDURE — 96372 THER/PROPH/DIAG INJ SC/IM: CPT

## 2017-08-09 PROCEDURE — 74011250637 HC RX REV CODE- 250/637: Performed by: EMERGENCY MEDICINE

## 2017-08-09 PROCEDURE — 85730 THROMBOPLASTIN TIME PARTIAL: CPT | Performed by: EMERGENCY MEDICINE

## 2017-08-09 PROCEDURE — 74011000250 HC RX REV CODE- 250: Performed by: INTERNAL MEDICINE

## 2017-08-09 PROCEDURE — 65390000012 HC CONDITION CODE 44 OBSERVATION

## 2017-08-09 RX ORDER — WARFARIN SODIUM 5 MG/1
5 TABLET ORAL
COMMUNITY
End: 2017-10-27

## 2017-08-09 RX ORDER — HYDROCODONE BITARTRATE AND ACETAMINOPHEN 5; 325 MG/1; MG/1
1 TABLET ORAL
Status: COMPLETED | OUTPATIENT
Start: 2017-08-09 | End: 2017-08-09

## 2017-08-09 RX ORDER — LANOLIN ALCOHOL/MO/W.PET/CERES
250 CREAM (GRAM) TOPICAL DAILY
Status: DISCONTINUED | OUTPATIENT
Start: 2017-08-09 | End: 2017-08-10 | Stop reason: HOSPADM

## 2017-08-09 RX ORDER — ERGOCALCIFEROL 1.25 MG/1
50000 CAPSULE ORAL
Status: DISCONTINUED | OUTPATIENT
Start: 2017-08-09 | End: 2017-08-10 | Stop reason: HOSPADM

## 2017-08-09 RX ORDER — CALCIUM CARBONATE 200(500)MG
200 TABLET,CHEWABLE ORAL 4 TIMES DAILY
Status: DISCONTINUED | OUTPATIENT
Start: 2017-08-09 | End: 2017-08-10 | Stop reason: HOSPADM

## 2017-08-09 RX ORDER — SODIUM CHLORIDE 0.9 % (FLUSH) 0.9 %
5-10 SYRINGE (ML) INJECTION EVERY 8 HOURS
Status: DISCONTINUED | OUTPATIENT
Start: 2017-08-09 | End: 2017-08-10 | Stop reason: HOSPADM

## 2017-08-09 RX ORDER — WARFARIN 7.5 MG/1
7.5 TABLET ORAL DAILY
COMMUNITY
End: 2017-10-27

## 2017-08-09 RX ORDER — SODIUM CHLORIDE 0.9 % (FLUSH) 0.9 %
5-10 SYRINGE (ML) INJECTION AS NEEDED
Status: DISCONTINUED | OUTPATIENT
Start: 2017-08-09 | End: 2017-08-10 | Stop reason: HOSPADM

## 2017-08-09 RX ORDER — SEVELAMER CARBONATE 800 MG/1
1600 TABLET, FILM COATED ORAL 3 TIMES DAILY
Status: DISCONTINUED | OUTPATIENT
Start: 2017-08-09 | End: 2017-08-10 | Stop reason: HOSPADM

## 2017-08-09 RX ORDER — CALCITRIOL 0.25 UG/1
0.5 CAPSULE ORAL 2 TIMES DAILY
Status: DISCONTINUED | OUTPATIENT
Start: 2017-08-09 | End: 2017-08-10 | Stop reason: HOSPADM

## 2017-08-09 RX ORDER — LEVETIRACETAM 500 MG/1
1000 TABLET ORAL
Status: DISCONTINUED | OUTPATIENT
Start: 2017-08-09 | End: 2017-08-10 | Stop reason: HOSPADM

## 2017-08-09 RX ORDER — SODIUM CHLORIDE 0.9 % (FLUSH) 0.9 %
10 SYRINGE (ML) INJECTION
Status: COMPLETED | OUTPATIENT
Start: 2017-08-09 | End: 2017-08-09

## 2017-08-09 RX ORDER — SODIUM CHLORIDE 0.9 % (FLUSH) 0.9 %
20 SYRINGE (ML) INJECTION
Status: COMPLETED | OUTPATIENT
Start: 2017-08-09 | End: 2017-08-09

## 2017-08-09 RX ADMIN — CALCIUM CARBONATE (ANTACID) CHEW TAB 500 MG 200 MG: 500 CHEW TAB at 17:25

## 2017-08-09 RX ADMIN — SODIUM CHLORIDE 1.5 ML: 9 INJECTION INTRAMUSCULAR; INTRAVENOUS; SUBCUTANEOUS at 11:48

## 2017-08-09 RX ADMIN — SEVELAMER CARBONATE 1600 MG: 800 TABLET, FILM COATED ORAL at 17:25

## 2017-08-09 RX ADMIN — HYDROCODONE BITARTRATE AND ACETAMINOPHEN 1 TABLET: 5; 325 TABLET ORAL at 01:55

## 2017-08-09 RX ADMIN — LEVETIRACETAM 1000 MG: 500 TABLET ORAL at 22:09

## 2017-08-09 RX ADMIN — SODIUM CHLORIDE, SODIUM LACTATE, CALCIUM CHLORIDE, MAGNESIUM CHLORIDE AND DEXTROSE 2000 ML: 1.5; 538; 448; 18.3; 5.08 INJECTION, SOLUTION INTRAPERITONEAL at 18:08

## 2017-08-09 RX ADMIN — ERGOCALCIFEROL 50000 UNITS: 1.25 CAPSULE ORAL at 10:45

## 2017-08-09 RX ADMIN — SODIUM CHLORIDE 100 ML: 900 INJECTION, SOLUTION INTRAVENOUS at 05:10

## 2017-08-09 RX ADMIN — Medication 10 ML: at 15:29

## 2017-08-09 RX ADMIN — Medication 20 ML: at 11:48

## 2017-08-09 RX ADMIN — SODIUM CHLORIDE 1000 ML: 900 INJECTION, SOLUTION INTRAVENOUS at 02:56

## 2017-08-09 RX ADMIN — Medication 10 ML: at 05:10

## 2017-08-09 RX ADMIN — CALCITRIOL 0.5 MCG: 0.25 CAPSULE, LIQUID FILLED ORAL at 10:45

## 2017-08-09 RX ADMIN — SEVELAMER CARBONATE 1600 MG: 800 TABLET, FILM COATED ORAL at 10:45

## 2017-08-09 RX ADMIN — HYDROCODONE BITARTRATE AND ACETAMINOPHEN 1 TABLET: 5; 325 TABLET ORAL at 06:16

## 2017-08-09 RX ADMIN — SODIUM CHLORIDE, SODIUM LACTATE, CALCIUM CHLORIDE, MAGNESIUM CHLORIDE AND DEXTROSE: 1.5; 538; 448; 18.3; 5.08 INJECTION, SOLUTION INTRAPERITONEAL at 22:09

## 2017-08-09 RX ADMIN — CALCIUM CARBONATE (ANTACID) CHEW TAB 500 MG 200 MG: 500 CHEW TAB at 22:08

## 2017-08-09 RX ADMIN — CALCIUM CARBONATE (ANTACID) CHEW TAB 500 MG 200 MG: 500 CHEW TAB at 10:44

## 2017-08-09 RX ADMIN — Medication 10 ML: at 22:10

## 2017-08-09 RX ADMIN — CALCITRIOL 0.5 MCG: 0.25 CAPSULE, LIQUID FILLED ORAL at 22:08

## 2017-08-09 RX ADMIN — SODIUM CHLORIDE 1000 ML: 900 INJECTION, SOLUTION INTRAVENOUS at 06:58

## 2017-08-09 RX ADMIN — Medication 10 ML: at 10:46

## 2017-08-09 RX ADMIN — IOPAMIDOL 100 ML: 755 INJECTION, SOLUTION INTRAVENOUS at 05:10

## 2017-08-09 RX ADMIN — Medication 250 MCG: at 10:45

## 2017-08-09 RX ADMIN — CALCIUM CARBONATE (ANTACID) CHEW TAB 500 MG 200 MG: 500 CHEW TAB at 15:28

## 2017-08-09 RX ADMIN — EPOETIN ALFA 10000 UNITS: 10000 SOLUTION INTRAVENOUS; SUBCUTANEOUS at 15:28

## 2017-08-09 NOTE — ED PROVIDER NOTES
HPI Comments: 39 y.o. female with past medical history significant for CKD (anuric, on peritoneal dialysis), seizures, reflux, h/o thromboembolus (anticoagulated on Coumadin), who presents from home for evaluation of bilateral hip pain and a syncopal episode that occurred at 2330 yesterday (8/8/2017). Pt reports that she was sitting at the table, and was eating a pickle when she started choking on the pickle and coughing. She states that due to coughing, she became lightheaded and \"blacked out\".  notes that he witnessed the event, and he states that when it occurred the pt's head \"fell back\" and she dropped the can of soda she was holding out of her hand. There was no head trauma, and pt did not fall to the ground during the syncopal episode. Pt regained consciousness shortly after the  started yelling her name, but she did not remember the episode and asked \"Did I fall asleep? \". In addition to the syncopal episode, pt notes that she has been experiencing atraumatic BL hip pain x 3 weeks. The hip pain is currently a 10/10 in severity and \"aching\", and she notes the left hip bothers her more than the right. She states that there is sensitivity to the touch, and also reports a \"knot\" present on the left hip. Pt reports that she has been compliant with all of her regularly scheduled medications, including Coumadin. She states her LMP was at the end of last month. Pt specifically denies any abdominal pain, CP, nausea, vomiting, diarrhea, constipation, vaginal discharge, vaginal bleeding, fevers, congestion, SOB, or cardiac hx. There are no other acute medical concerns at this time. Social hx: - Tobacco, - EtOH, - Illicit Drug Use   PCP: Dominique Guerra MD     Note written by Marina Portillo, as dictated by Lidia Borrero MD 12:49 AM     The history is provided by the patient and the spouse. No  was used.         Past Medical History:   Diagnosis Date    Chronic kidney disease     [facility] ([facility] [address] [facility]     Seizures ([facility] [address]        Past Surgical History:   Procedure Laterality Date    BIOPSY      parathyroid    DELIVERY       HX  SECTION      HX CHOLECYSTECTOMY      HX OTHER SURGICAL      fistula and PD port in stomach     HX TRANSPLANT  [facility]         History reviewed. No pertinent family history. Social History     Social History    Marital status: SINGLE     Spouse name: N/A    Number of children: N/A    Years of education: N/A     Occupational History    Not on file. Social History Main Topics    Smoking status: Never Smoker    Smokeless tobacco: Never Used    Alcohol use No    Drug use: No    Sexual activity: Not on file     Other Topics Concern    Not on file     Social History Narrative         ALLERGIES: Phoslo [calcium acetate]    Review of Systems   Constitutional: Negative for fever. HENT: Negative for congestion. Eyes: Negative. Respiratory: Positive for cough and choking. Negative for shortness of breath. Cardiovascular: Negative for chest pain. Gastrointestinal: Negative for abdominal pain, constipation, diarrhea, nausea and vomiting. Genitourinary: Negative for vaginal bleeding and vaginal discharge. Musculoskeletal: Positive for arthralgias (bilateral hips). Skin: Negative. Neurological: Positive for syncope and light-headedness. Psychiatric/Behavioral: Negative. All other systems reviewed and are negative. Vitals:    17 0024   BP: (!) 70/49   Pulse: (!) 126   Resp: 18   Temp: 98.4 °F (36.9 °C)   SpO2: 94%   Weight: 111.6 kg (246 lb)   Height: 5' 1\" (1.549 m)            Physical Exam   Nursing note and vitals reviewed. CONSTITUTIONAL: Well-appearing; well-nourished; in no apparent distress  HEAD: Normocephalic; atraumatic  EYES: PERRL; EOM intact; conjunctiva and sclera are clear bilaterally.   ENT: No rhinorrhea; normal pharynx with no tonsillar hypertrophy; mucous membranes pink/moist, no erythema, no exudate. NECK: Supple; non-tender; no cervical lymphadenopathy  CARD: Normal S1, S2; no murmurs, rubs, or gallops. Regular rate and rhythm. RESP: Normal respiratory effort; breath sounds clear and equal bilaterally; no wheezes, rhonchi, or rales. ABD: Normal bowel sounds; non-distended; non-tender; no palpable organomegaly, no masses, no bruits. Back Exam: Normal inspection; no vertebral point tenderness, no CVA tenderness. Normal range of motion. EXT: Normal ROM in all four extremities; non-tender to palpation; no swelling or deformity; distal pulses are normal, no edema. SKIN: Warm; dry; tender and mildly angulated area located in the left thigh and groin. NEURO:Alert and oriented x 3, coherent, CECE-XII grossly intact, sensory and motor are non-focal.        MDM  Number of Diagnoses or Management Options  Elevated INR:   ESRD on peritoneal dialysis [facility]):   Hematoma of left thigh, initial encounter:   Idiopathic hypotension:   Syncope, vasovagal:   Diagnosis management comments: Assessment: 39 year female with multiple chronic complaints medical problems, who presents with syncope versus seizure disorder, rule out cardiac ischemia, electrolyte abnormality, infection, dehydration. The patient also has moderate swelling and tenderness to both hips and thigh with the left greater than right. These appear consistent with a hematoma/bruise, which the patient has had for 3 weeks rule out abscess. Plan: EKG/ CT scan of the head/ lab/ antiemetics and analgesia/ CT scan of the pelvis/ IV fluid/ serial exam/ consult hospitalist/ Monitor and Reevaluate.          Amount and/or Complexity of Data Reviewed  Clinical lab tests: ordered and reviewed  Tests in the radiology section of CPT®: ordered and reviewed  Tests in the medicine section of CPT®: ordered and reviewed  Discussion of test results with the performing providers: yes  Decide to obtain previous medical records or to obtain history from someone other than the patient: yes  Obtain history from someone other than the patient: yes  Review and summarize past medical records: yes  Discuss the patient with other providers: yes  Independent visualization of images, tracings, or specimens: yes    Risk of Complications, Morbidity, and/or Mortality  Presenting problems: moderate  Diagnostic procedures: moderate  Management options: moderate    Critical Care  Total time providing critical care: (Total critical care time spend exclusive of procedures: 45 minutes)    Patient Progress  Patient progress: stable    ED Course       Procedures    ED EKG interpretation:  Rhythm: sinus tachycardia; and regular . Rate (approx.): 104; Axis: normal; P wave: normal; QRS interval: normal ; ST/T wave: non-specific changes; in  Lead: Diffusely; Other findings: abnormal ekg. This EKG was interpreted by Camryn Escobedo MD,ED Provider. XRAY INTERPRETATION (ED MD)  Chest Xray  No acute process seen. Normal heart size. No bony abnormalities. No infiltrate. Camryn Escobedo MD 12:53 AM    PROGRESS NOTE:  Pt has been reexamined by Camryn Escobedo MD all available results have been reviewed with pt and any available family. The patient remained hypotensive. The patient explained that she has been taking L. Of fluid off herself during her dialysis because she felt like she was getting too much fluid. However, when she does that she becomes dehydrated, and her blood pressure drops. The patient doesn't appear septic. She is moderately dehydrated. Syncope may be likely due to orthostatic hypotension with a vasovagal component. Pt understands sx, dx, and tx in ED. Care plan has been outlined and questions have been answered. Pt and any available family understands and agrees to need for admission to hospital for further tx not available in ED. Pt is ready for admission.     Written by Camryn Escobedo MD,  05:21 AM    CONSULT NOTE:  Camryn Escobedo MD spoke with Dr. Jeannine Selby of the adult hospitalist team. Discussed patient's presentation, history, physical assessment, and available diagnostic results.  He will evaluate, write orders and admit the patient to the hospital. 05:22 AM    .                        SONA. we'll

## 2017-08-09 NOTE — PROGRESS NOTES
Nunu Miller MD   Phone/text: [phone] (7am to 7pm)  After 7pm please call  for hospitalist on call    Hospitalist Progress Note     8/9/2017   PCP:  Dr. Danyell Leonard MD  Chief Complaint   Patient presents with    Syncope    Hip Pain       Admission Summary:   A 27-year-old [facility] [employer] female with past medical history of end-stage renal disease on peritoneal dialysis status post prior failed kidney transplant, seizures and morbid obesity, thromboembolus, long-term anticoagulation therapy on Coumadin presented to the emergency department from home with reported syncope, bilateral hip pain. Reason For Visit:  Syncope    Assessment/Plan   Syncope (POA) - sounds vasovagal due to coughing and possibly worsened by dehydration and hypotension. Does not sound like a seizure  - CT head 8/9 without acute intracranial abnormality  - Obtain echocardiogram  - Monitor with telemetry    Hypotension (POA) - seems to have resolved with fluids, patient notes recent diagnosis of peritonitis and adjustment of diasylate with use of a high glucose solution starting a week ago to remove fluid and switching back to moderate glucose two days ago  - Monitor BP    Peritonitis (POA) - patient started treatment with ceftazidine in her mid-day diasylate starting a week ago  - Check peritoneal fluid cell count and gram stain  - Continue ceftazidine in diasylate    Leukocytosis (POA) - residual peritonitis? - Bcx 8/9 no growth  - CXR without acute process  - Hold on empiric antibiotics for now, continue ceftazidine as above    Bilateral Hip pain (POA) - arthritis? Hematomas? Pain does not localize to findings on CT  - CT pelvis 8/9 without evidence of abscess in the left thigh or hip location but notable inguinal lymph nodes, right greater than left. There is also an oval low-density lesion in the right inguinal region, which may represent a postoperative seroma, and it measures 7.2 x 5 cm. Bilateral hip DJD.  - Obtain US of bilateral hips  - Consider ortho consult for sampling of area when INR improved    Coagulopathy (POA) - likely due to antibiotics potentiating coumadin  - Hold coumadin  - Monitor INR    ESRD - continue PD  - Nephrology consulted    Seizures (chronic) - seems unlikely that this was a seizure  - Continue keppra    History of thromboembolism - PE seems unlikely with supratherapeutic INR  - Coumadin as above    Morbid obesity (chronic) - Body mass index is 46.48 kg/(m^2). See orders for other plans. VTE prophylaxis: restart coumadin when INR improved  Discussed plan of care with Patient/Family, Nurse and    Pre-admission lived at home  Discharge plan: home  Estimated time to discharge: 1-2 days     Subjective   Ms. Lulu Thomas is feeling better. Denies dizziness, chest pain, palpitations. Has no abdominal pain or drainage from her PD site. Reviewed interval history  Physical examination     Visit Vitals    /61 (BP 1 Location: Right arm, BP Patient Position: At rest)    Pulse 95    Temp 98.5 °F (36.9 °C)    Resp 20    Ht 5' 1\" (1.549 m)    Wt 111.6 kg (246 lb)    LMP 2017    SpO2 93%    BMI 46.48 kg/m2       Temp (24hrs), Av.5 °F (36.9 °C), Min:98.4 °F (36.9 °C), Max:98.6 °F (37 °C)    No intake or output data in the 24 hours ending 17 0900    Gen - NAD  HEENT - MMM  Neck - supple, full ROM  CV - RRR, no MRG  Resp - lungs CTA b/l, no wheezing, normal WOB  GI - abdomen S, NT, mildly distended, +BS   - no CVA tenderness, bladder non-palpable in lower abdomen  MSK - normal tone and bulk, no edema  Neuro - A&O, no focal deficits  Psych - calm and cooperative with normal affect    Data Review       Telemetry x   ECG x   Xray    CT scan x   MRI    Echocardiogram    Ultrasound              I have reviewed the flow sheet and recent notes  New labs and data personally reviewed.     Recent Labs      17   0131   WBC  11.6*   HGB  9.2*   HCT 30.2*   PLT  380     Recent Labs      08/09/17   0131   NA  138   K  3.7   CL  96*   CO2  27   GLU  100   BUN  34*   CREA  16.20*   CA  8.5   MG  1.7   ALB  2.1*   TBILI  0.3   SGOT  40*   ALT  27   INR  4.0*       Medications reviewed  Current Facility-Administered Medications   Medication Dose Route Frequency    sodium chloride (NS) flush 5-10 mL  5-10 mL IntraVENous Q8H    sodium chloride (NS) flush 5-10 mL  5-10 mL IntraVENous PRN    calcitRIOL (ROCALTROL) capsule 0.5 mcg  0.5 mcg Oral BID    calcium carbonate (TUMS) chewable tablet 200 mg [elemental]  200 mg Oral QID    cyanocobalamin (VITAMIN B12) tablet 250 mcg  250 mcg Oral DAILY    ergocalciferol (ERGOCALCIFEROL) capsule 50,000 Units  50,000 Units Oral Q7D    levETIRAcetam (KEPPRA) tablet 1,000 mg  1,000 mg Oral QHS    sevelamer carbonate (RENVELA) tab 1,600 mg  1,600 mg Oral TID         Kellie Billy MD  Internal Medicine  8/9/2017

## 2017-08-09 NOTE — ED NOTES
TRANSFER - OUT REPORT:    Marjan Trammell report given to Yudi(name) on Iram Pham  being transferred to 6S(unit) for routine progression of care       Report consisted of patients Situation, Background, Assessment and   Recommendations(SBAR). Information from the following report(s) SBAR was reviewed with the receiving nurse. Lines:   Peripheral IV 08/09/17 Right Hand (Active)   Site Assessment Clean 8/9/2017  2:37 AM   Phlebitis Assessment 0 8/9/2017  2:37 AM   Infiltration Assessment 0 8/9/2017  2:37 AM   Dressing Status Clean 8/9/2017  2:37 AM   Dressing Type Transparent 8/9/2017  2:37 AM   Hub Color/Line Status Yellow 8/9/2017  2:37 AM       Peripheral IV 08/09/17 Right;Upper Arm (Active)   Site Assessment Clean, dry, & intact 8/9/2017  4:40 AM   Phlebitis Assessment 0 8/9/2017  4:40 AM   Infiltration Assessment 0 8/9/2017  4:40 AM   Dressing Status Clean, dry, & intact 8/9/2017  4:40 AM   Dressing Type Tape;Transparent 8/9/2017  4:40 AM   Hub Color/Line Status Pink;Capped;Flushed;Patent 8/9/2017  4:40 AM        Opportunity for questions and clarification was provided.       Patient transported with:   Monitor  O2 @ 2 liters  Registered Nurse

## 2017-08-09 NOTE — ED NOTES
Patient had questions about reason for admission and pain to hips. MD notified. Dr Khadijah Rodriguez at bedside.

## 2017-08-09 NOTE — CONSULTS
[facility] ([facility])         Nephrology and Hypertension         Patient seen and examined. Full consult dictated    ASSESSMENT:  ESRD on CCPD (Dr. Zhen Donahue at [facility])  Recent Peritonitis as outpt- pt on IP Ceftazidime thru 8/13. Gets in one exchange daily; Hypotension/Syncope  Anemia of ESRD  Failed Renal Tx in Nov'2016  SHPT; hx of parathyroidectomy and hypocalcemia    PLAN:  Will order CAPD in house; 1.5% dianneal, 5 times per day  Monitor IOs of with CAPD  Add EPO  IP Ceftazidime 2 grams daily in one of the PD exchanges per day  Ct home meds, Renvela, Calcitriol  CAPD orders placed; ABx d/w pharmacy, who will order. Very difficult to place order for IP ABx  Follow cultures    D/W pt/Pharmacy  Thanks for Renal consult. We will follow patient with you.     Signed by:  Eleno Mcbride MD  Nephrology and HTN

## 2017-08-09 NOTE — IP AVS SNAPSHOT
[address] 
[phone] Patient: Leandro Grove MRN: [id number] Current Discharge Medication List  
  
CONTINUE these medications which have NOT CHANGED Dose & Instructions Dispensing Information Comments Morning Noon Evening Bedtime  
 calcitRIOL 0.5 mcg capsule Commonly known as:  ROCALTROL Your last dose was: Your next dose is:    
   
   
 Dose:  0.5 mcg Take 0.5 mcg by mouth two (2) times a day. Refills:  0  
     
   
   
   
  
 calcium carbonate 200 mg calcium (500 mg) Chew Commonly known as:  TUMS Your last dose was: Your next dose is:    
   
   
 Dose:  1 Tab Take 1 Tab by mouth four (4) times daily. Quantity:  120 Tab Refills:  1  
     
   
   
   
  
 * COUMADIN 5 mg tablet Generic drug:  warfarin Your last dose was: Your next dose is:    
   
   
 Dose:  5 mg Take 5 mg by mouth. Tues, Thursday Refills:  0  
     
   
   
   
  
 * COUMADIN 7.5 mg tablet Generic drug:  warfarin Your last dose was: Your next dose is:    
   
   
 Dose:  7.5 mg Take 7.5 mg by mouth daily. Sat, Sun, Mon, Wed, Fri Refills:  0 KEPPRA 1,000 mg tablet Generic drug:  levETIRAcetam  
   
Your last dose was: Your next dose is:    
   
   
 Dose:  1000 mg Take 1,000 mg by mouth nightly. Refills:  0 PEPCID AC 20 mg tablet Generic drug:  famotidine Your last dose was: Your next dose is:    
   
   
 Dose:  20 mg Take 20 mg by mouth daily as needed for Other (indigestion). Refills:  0  
     
   
   
   
  
 potassium chloride 20 mEq tablet Commonly known as:  K-DURJOSEOR-CON Your last dose was: Your next dose is:    
   
   
 Dose:  10 mEq Take 10 mEq by mouth daily. Refills:  0  
     
   
   
   
  
 RENVELA 800 mg Tab tab Generic drug:  sevelamer carbonate Your last dose was: Your next dose is:    
   
   
 Dose:  1600 mg Take 1,600 mg by mouth three (3) times daily. Refills:  0  
     
   
   
   
  
 VITAMIN B-12 500 mcg tablet Generic drug:  cyanocobalamin Your last dose was: Your next dose is:    
   
   
 Dose:  250 mcg Take 250 mcg by mouth daily. Refills:  0  
     
   
   
   
  
 VITAMIN D2 50,000 unit capsule Generic drug:  ergocalciferol Your last dose was: Your next dose is:    
   
   
 Dose:  00932 Units Take 50,000 Units by mouth daily. Refills:  0  
     
   
   
   
  
 * Notice: This list has 2 medication(s) that are the same as other medications prescribed for you. Read the directions carefully, and ask your doctor or other care provider to review them with you.

## 2017-08-09 NOTE — H&P
[address] [facility] [address]   HISTORY AND PHYSICAL       Name:  Nohemi Remy   MR#:  [id number]   :  1980   Account #:  [de-identified]        Date of Adm:  2017       CHIEF COMPLAINT: Passed out. HISTORY OF PRESENT ILLNESS: A 78-year-old    female with past medical history of end-stage renal disease on   peritoneal dialysis status post prior failed kidney transplant, seizures   and morbid obesity, thromboembolus, long-term anticoagulation   therapy on Coumadin presented to the emergency department from   home with reported syncope, bilateral hip pain. The patient reports that   she passed out earlier tonight while sitting at the table eating a pickle,   started choking, coughing, became lightheaded and \"blacked out. \" Her    reportedly witnessed the event. According to the patient's    before the patient's eyes rolled back in her head and then she   lost consciousness for only a few seconds. She she woke, she   reportedly asked her  \"did I fall asleep. \" There were no reports   of the patient having any slurred speech, facial droop, numbness,   paresthesias, headache, visual disturbance, neck pain, chest pain,   shortness of breath, abdominal pain, nausea, vomiting, diarrhea, bowel   or bladder incontinence. The patient complains of having severe   bilateral hip pain over the past 3 weeks. The pain has notably been   severe, rated as 10/10, aching in character, aggravated with any   movement, weightbearing without specific alleviating factors. There   has been no reports of any recent falls or injury. She incidentally notes   she had a \"bump\" in her right thigh, but only had been noted after she   had CT results which showed some evidence of having questionable   seroma.  On arrival in the emergency department, initial recorded vital   signs have revealed blood pressure of 70/49, heart rate 126,   respiratory rate 18, O2 saturation 94% on room air. A 12-lead EKG   shows sinus tachycardia, 104 beats a minute. CT of the head without   contrast showed no acute intracranial abnormalities. CT of the pelvis   with IV contrast was negative for any evidence of any abscess. However, incidentally notes a 7.2 cm x 5 cm low density lesion in the   right inguinal region which was felt to be possible postoperative   seroma per the radiologist's report. The patient does not complain of   any right groin pain. She did mention the \"bump\" in her right thigh that   she \"popped. \" Per the ED, the patient was given initial dose of 0.9%   normal saline, 1 liter IV fluid bolus as she remains hypotensive in   shock. The patient; however, since has been able to ambulate while in   the emergency department. Denies any dizziness, lightheadedness. The patient is now seen for admission to hospitalist service for   continued evaluation and treatments. The patient further notes that   time she has recently been having difficulty with her dialysate in regard   to having low blood pressures when using higher concentrate and   subsequently she had adjustments to lower dose of dialysate. The   patient notes she has been followed by a nephrologist at [facility]. PAST MEDICAL HISTORY     1. End-stage renal disease, hemodialysis. 2. Seizures. 3. Morbid obesity. PAST SURGICAL HISTORY    1. Parathyroidectomy. 2.  section. 3. Cholecystectomy. 4. Left arm AV fistula. 5. Peritoneal dialysis catheter insertion. 6. Kidney transplant. MEDICATIONS    1. Calcitriol 0.5 mcg p.o. b.i.d.   2. Calcium carbonate 200 mg 1 tablet p.o. q.i.d.   3. Vitamin B12, 250 mcg p.o. daily. 4. Vitamin D2, 50,000 units p.o. daily. 5. Famotidine 20 mg p.o. daily p.r.n.   6. Keppra 1000 mg p.o. at bedtime. 7. Potassium chloride 10 mEq p.o. daily. 8. Renvela 1600 mg p.o. t.i.d.   9. Warfarin 5 mg p.o. daily on , .    10. Warfarin 7.5 mg p.o. daily on Saturday and Sundays, Mondays,   Wednesdays, Fridays. ALLERGIES: PHOSLO (CALCIUM ACETATE). SOCIAL HISTORY: She denies smoking, alcohol, illicit drugs. She is   ,  at the bedside. FAMILY HISTORY: Diabetes mellitus in grandmother. No reported   family members with heart attacks, strokes, or kidney disease. REVIEW OF SYSTEMS   Ten systems reviewed. Pertinent positives as HPI, otherwise negative. PHYSICAL EXAMINATION   VITAL SIGNS: Temperature is 98.4 degrees Fahrenheit, blood   pressure 101/59, heart rate 73, respiratory rate of 14, O2 saturation   97% on room air. Recorded weight of 246 pounds (111.6 kg), recorded   height of 5 feet 1 inch tall. GENERAL: Morbidly obese female, in no acute respiratory distress. PSYCHIATRIC: The patient is awake and oriented x3. NEUROLOGIC: GCS of 15. Moves extremities x4. Sensation is grossly   intact. No slurred speech, no facial droop. HEENT: Normocephalic, atraumatic. PERRLA is intact. Sclerae are   anicteric, conjunctivae clear. Nares are patent. Oropharynx clear. Tongue is midline, nonedematous. NECK: Supple, without lymphadenopathy, JVD, carotid bruits or   thyromegaly. LYMPH: Negative for cervical or supraclavicular adenopathy. RESPIRATORY: Lungs clear to auscultation bilaterally. CARDIOVASCULAR: Heart is regular rhythm, normal S1, S2, without   murmurs, rubs or gallops. GASTROINTESTINAL: Abdomen soft, nontender, nondistended,   normoactive bowel sounds. No rebound, guarding or rigidity. No   auscultated abdominal bruits. No pulsatile mass. Peritoneal dialysis   catheter is in place and left lower quadrant. BACK: No CVA tenderness. No step-off deformity. MUSCULOSKELETAL: No acute palpable bony deformity. Negative for   calf tenderness.  Old dried blood present on the medial aspect of the   right thigh with no evidence of any seroma, induration, swelling or   edema (exam was performed with female nurse chaperone present in   room). VASCULAR: 2+ radial, 1+ dorsalis pedis pulses without cyanosis,   clubbing. She has nonpitting edema bilateral lower extremities with   chronic venous stasis discoloration of both legs. Left arm AV fistula. SKIN: Warm and dry. LABORATORY DATA: I reviewed as follows: Sodium 138, potassium   3.7, chloride 96, CO2 of 34, BUN 16.2, glucose of 100, anion gap 15,   calcium 8.5. Magnesium is 1.7. GFR 3. Total bilirubin is 0.3, total   protein 8.4, albumin is 2.1, ALT 27, AST 40, alkaline phosphatase 74,   lactic acid is 1.2. CK total of 164, CK-MB less than 1.0, troponin I of   less than 0.04. WBC of 11.6, hemoglobin 9.2, hematocrit 38.2,   platelets 286, neutrophils 80%. INR 4.0, PT of 40.1. PTT of 56.7. CT of   the head without contrast, no acute intracranial abnormality. CT of the   pelvis with IV contrast is also reviewed and noted in HPI with a 7.2 cm   x 5 cm low density lesion in the right inguinal region, evidence of   peritoneal dialysis catheter and bilateral hip degenerative joint disease. Chest x-ray portable, no evidence of acute cardiopulmonary process. A   12-lead EKG sinus tachycardia at 104 beats a minute. PLAN   1. Shock/hypotension. Blood pressure has improved. The patient will   be given additional dose of 0.9% normal saline. This may be   secondary to hypovolemia. The patient keeps eluded to the fact has   been trying to adjust the dialysate with the peritoneal dialysis   treatment. Blood pressure has improved, admit the patient to telemetry   floor. The patient, as mentioned, prior to my assessment had been   noted to be ambulatory without recurrence of syncope, dizziness,   lightheadedness or other neurological deficits. 2. Syncope - etiology unknown. Order a 2D echocardiogram, place on   neurovascular checks, fall precautions. 3. Tachycardia, currently resolved. Continue telemetry. 4. End-stage renal disease, hemodialysis.  Consult with nephrologist.   5. Leukocytosis. Repeat CBC, but no defined source for infection. 6. Anemia of chronic disease. Repeat CBC. 7. Seizure disorder. Continue on Keppra. 8. Bilateral hip pain - may be secondary to degenerative joint disease   with noted on CT of the pelvis. No other acute findings, no abscess. Seroma noted on CAT scan was not noted on physical exam.   9. Bilateral hip pain. Provide supportive care. 10. Venous thromboembolism prophylaxis, sequential compression   devices lower extremities. MD Pauline Grigsby / Leonora Fontaine   D:  08/09/2017   07:12   T:  08/09/2017   08:36   Job #:  [id number]

## 2017-08-09 NOTE — ED NOTES
Pt is PD patient and reports she is currently being treated for Peritonitis, by infusing abx into her abdomen. Pt reports her BP drops and her HR goes up when she has peritonitis.

## 2017-08-09 NOTE — ED TRIAGE NOTES
2330 pt had an episode where she \"blacked out\" while eating. Pt's  states the patient's head went back and she stopped responding for a few seconds. Pt woke up and immediately knew who her  was. No shaking during episode. Hx of SZ, but this was not her normal seizure. Pt also reports 3 weeks of bilateral hip pain that she wants evaluated.

## 2017-08-09 NOTE — IP AVS SNAPSHOT
[address] 
[phone] Patient: Cas Avelar MRN: [id number] KGP:[id number] You are allergic to the following Allergen Reactions Phoslo (Calcium Acetate) Angioedema Recent Documentation Height Weight BMI OB Status Smoking Status 1.549 m 102.8 kg 42.82 kg/m2 Having regular periods Never Smoker Unresulted Labs Order Current Status CULTURE, BLOOD, PAIRED Preliminary result CULTURE, BODY FLUID W GRAM STAIN Preliminary result Emergency Contacts Name Discharge Info Relation Home Work Mobile [address]  Parent [1] [phone] About your hospitalization You were admitted on:  August 9, 2017 You last received care in the:  [facility] 6S NEURO-SCI TELE You were discharged on:  August 10, 2017 Unit phone number:  [phone] Why you were hospitalized Your primary diagnosis was: Shock (Hcc) Your diagnoses also included:  Leukocytosis, Syncope, Tachycardia, Hypotension, Esrd (End Stage Renal Disease) (Hcc), Bilateral Hip Pain Providers Seen During Your Hospitalizations Provider Role Specialty Primary office phone Neelima Perry MD Attending Provider Emergency Medicine [phone] Camden Stern MD Attending Provider [facility] [phone] Nunu Miller MD Attending Provider Internal Medicine [phone] Your Primary Care Physician (PCP) Primary Care Physician Office Phone Office Fax Skylar Robert [phone] Follow-up Information Follow up With Details Comments Contact Info Danyell Leonard MD Call As needed for primary care [place] [address] 
[phone] [facility] coumadin clinic Schedule an appointment as soon as possible for a visit on 8/11/2017 PT/INR check Current Discharge Medication List  
  
 CONTINUE these medications which have NOT CHANGED Dose & Instructions Dispensing Information Comments Morning Noon Evening Bedtime  
 calcitRIOL 0.5 mcg capsule Commonly known as:  ROCALTROL Your last dose was: Your next dose is:    
   
   
 Dose:  0.5 mcg Take 0.5 mcg by mouth two (2) times a day. Refills:  0  
     
   
   
   
  
 calcium carbonate 200 mg calcium (500 mg) Chew Commonly known as:  TUMS Your last dose was: Your next dose is:    
   
   
 Dose:  1 Tab Take 1 Tab by mouth four (4) times daily. Quantity:  120 Tab Refills:  1  
     
   
   
   
  
 * COUMADIN 5 mg tablet Generic drug:  warfarin Your last dose was: Your next dose is:    
   
   
 Dose:  5 mg Take 5 mg by mouth. Tues, Thursday Refills:  0  
     
   
   
   
  
 * COUMADIN 7.5 mg tablet Generic drug:  warfarin Your last dose was: Your next dose is:    
   
   
 Dose:  7.5 mg Take 7.5 mg by mouth daily. Sat, Sun, Mon, Wed, Fri Refills:  0 KEPPRA 1,000 mg tablet Generic drug:  levETIRAcetam  
   
Your last dose was: Your next dose is:    
   
   
 Dose:  1000 mg Take 1,000 mg by mouth nightly. Refills:  0 PEPCID AC 20 mg tablet Generic drug:  famotidine Your last dose was: Your next dose is:    
   
   
 Dose:  20 mg Take 20 mg by mouth daily as needed for Other (indigestion). Refills:  0  
     
   
   
   
  
 potassium chloride 20 mEq tablet Commonly known as:  K-DUR, KLOR-CON Your last dose was: Your next dose is:    
   
   
 Dose:  10 mEq Take 10 mEq by mouth daily. Refills:  0  
     
   
   
   
  
 RENVELA 800 mg Tab tab Generic drug:  sevelamer carbonate Your last dose was: Your next dose is:    
   
   
 Dose:  1600 mg Take 1,600 mg by mouth three (3) times daily. Refills:  0 VITAMIN B-12 500 mcg tablet Generic drug:  cyanocobalamin Your last dose was: Your next dose is:    
   
   
 Dose:  250 mcg Take 250 mcg by mouth daily. Refills:  0  
     
   
   
   
  
 VITAMIN D2 50,000 unit capsule Generic drug:  ergocalciferol Your last dose was: Your next dose is:    
   
   
 Dose:  41967 Units Take 50,000 Units by mouth daily. Refills:  0  
     
   
   
   
  
 * Notice: This list has 2 medication(s) that are the same as other medications prescribed for you. Read the directions carefully, and ask your doctor or other care provider to review them with you. Discharge Instructions Please bring this form with you to show your primary care provider at your follow-up appointment. Primary care provider:  Dr. Km Lu MD 
 
Discharging provider:  Deeap Castellano MD 
 
You have been admitted to the hospital with the following diagnoses: · Hypotension · Syncope · ESRD (end stage renal disease) ([facility] [address] FOLLOW-UP CARE RECOMMENDATIONS: 
 
APPOINTMENTS: 
Follow-up Information Follow up With Details Comments Contact Info Km Lu MD Call As needed for primary care [place] [address] 
[phone] [facility] coumadin clinic Schedule an appointment as soon as possible for a visit on 8/11/2017 PT/INR check FOLLOW UP tests recommended:  Please call the [facility] coumadin clinic and have your INR checked tomorrow, 8/11/17 SYMPTOMS to watch for: fever, chills, nausea, vomiting, diarrhea, abdominal pain. DIET/what to eat:  Renal Diet ACTIVITY:  Activity as tolerated DIALYSIS instructions: Please call your dialysis coordinator to discuss the type of diasylate to use given your low blood pressure. What to do if new or unexpected symptoms occur?  
If you experience any of the above symptoms (or should other concerns or questions arise after discharge) please call your primary care physician. Return to the emergency room if you cannot get hold of your doctor. · It is very important that you keep your follow-up appointment(s). · Please bring discharge papers, medication list (and/or medication bottles) to your follow-up appointments for review by your outpatient provider(s). · Please check the list of medications and be sure it includes every medication (even non-prescription medications) that your provider wants you to take. · It is important that you take the medication exactly as they are prescribed. · Keep your medication in the bottles provided by the pharmacist and keep a list of the medication names, dosages, and times to be taken in your wallet. · Do not take other medications without consulting your doctor. · If you have any questions about your medications or other instructions, please talk to your nurse or care provider before you leave the hospital. 
 
I understand that if any problems occur once I am at home I am to contact my physician. These instructions were explained to me and I had the opportunity to ask questions. Discharge Orders None [employer] Announcement We are excited to announce that we are making your provider's discharge notes available to you in [employer]. You will see these notes when they are completed and signed by the physician that discharged you from your recent hospital stay. If you have any questions or concerns about any information you see in wufoot, please call the Health Information Department where you were seen or reach out to your Primary Care Provider for more information about your plan of care. Introducing [facility]! Jenny Akhtar introduces [employer] patient portal. Now you can access parts of your medical record, email your doctor's office, and request medication refills online.    
 
1. In your internet browser, go to [email or web address] 2. Click on the First Time User? Click Here link in the Sign In box. You will see the New Member Sign Up page. 3. Enter your [employer] Access Code exactly as it appears below. You will not need to use this code after youve completed the sign-up process. If you do not sign up before the expiration date, you must request a new code. · [employer] Access Code: [email or web address] Expires: 11/7/2017 12:39 AM 
 
4. Enter the last four digits of your Social Security Number (xxxx) and Date of Birth (mm/dd/yyyy) as indicated and click Submit. You will be taken to the next sign-up page. 5. Create a Sina ID. This will be your Sina login ID and cannot be changed, so think of one that is secure and easy to remember. 6. Create a Sina password. You can change your password at any time. 7. Enter your Password Reset Question and Answer. This can be used at a later time if you forget your password. 8. Enter your e-mail address. You will receive e-mail notification when new information is available in [address] 9. Click Sign Up. You can now view and download portions of your medical record. 10. Click the Download Summary menu link to download a portable copy of your medical information. If you have questions, please visit the Frequently Asked Questions section of the [employer] website. Remember, [employer] is NOT to be used for urgent needs. For medical emergencies, dial 911. Now available from your iPhone and Android! General Information Please provide this summary of care documentation to your next provider. Patient Signature:  ____________________________________________________________ Date:  ____________________________________________________________  
  
Dewane Salen Provider Signature:  ____________________________________________________________ Date:  ____________________________________________________________

## 2017-08-09 NOTE — CONSULTS
[address] [facility] [address] [facility]   [address] [facility]       Name:  Annette Holm   MR#:  [id number]   :  1980   Account #:  [de-identified]    Date of Consultation:  2017   Date of Adm:  2017       REQUESTING PHYSICIAN: Joy Medrano. MD Laly    REASON FOR CONSULTATION: Evaluation and management of   ESRD, the patient on PD. HISTORY OF PRESENT ILLNESS: The patient is a 54-year-old   female with past medical history significant for ESRD, who is on CCPD   at home. She was on dialysis for 8 years before she received right   cadaveric kidney transplant in . That lasted approximately 4 years   and failed in 2016. She has been on PD since then. She presented with syncope associated with some choking, coughing   after eating a pickle at home. She became lightheaded and blacked   out. Was witnessed by her . Her episode of syncope was only   for a few seconds. She then quickly woke up. There is no associated   symptoms of vomiting, diarrhea, chest pain, facial droop, slurred   speech, numbness, bowel or bladder incontinence. No fevers, chills or   abdominal pain either. She had associated bilateral hip pain over the past 3 weeks. Imaging   studies during this admission did not reveal any acute fracture or   dislocation. Nephrology consulted for management of PD. She is on CCPD, for 9   hour duration. She has been using 2.5% bags at home. Total volume is   12 L per day. She has recent episode of peritonitis and in fact has   been taking ceftazidime around 1.5 g with 1 exchange per day. Her   antibiotic course is to be finished on . She is followed by Dr. Yasmeen Duarte at [facility]. In the ED she was found to be hypotensive, tachycardic, and CT head   did not reveal any acute intracranial abnormalities. She received 1 L of   normal saline in the ED. At present, she is feeling better. Did not have any recurrence of   syncopal episode.  She was sleepy but arousable when I saw her. PAST MEDICAL HISTORY:   1. ESRD secondary to questionable hypertension. Was on HD for 8   years prior to transplant. 2. Status post right lower quadrant cadaveric kidney transplant in . 3. On PD since 2016. 4. Morbid obesity. 5. Seizure disorder. 6. Secondary hyperparathyroidism. PAST SURGICAL HISTORY:   1. Status post parathyroidectomy. 2. . 3. Cholecystectomy. 4. Left arm AV fistula. 5. PD catheter insertion. 6. Kidney transplant. HOME MEDICATIONS: Include:    1. Calcitriol 0.5 mcg b.i.d.   2. Calcium carbonate 200 mg 1 tablet p.o. q.i.d.   3. Vitamin B12 250 mcg daily. 4. Vitamin D2 50,000 units every week. 5. Famotidine p.r.n.   6. Keppra 1000 mg p.o. at bedtime. 7. Potassium chloride 10 mEq daily. 8. Renvela 1600 mg p.o. t.i.d. with meals. 9. Coumadin. ALLERGIES: PHOSLO. FAMILY HISTORY: Diabetes in her grandmother. Past medical history as reviewed above. In addition, she also has a   history of DVT and has been on Coumadin. REVIEW OF SYSTEMS: A 10-point review of system obtained and as   noted in HPI. Otherwise negative. PHYSICAL EXAMINATION   GENERAL: A young female, well built, well nourished, in no acute   distress. VITAL SIGNS: She is afebrile, pulse 90, /81, respiration of 16,   saturating 94% on 2 L nasal cannula. HEENT: Head is atraumatic, normocephalic. Conjunctivae slightly pale. No scleral icterus. Mucous membranes are moist.   NECK: Thick circumference. No JVD. LUNGS: Clear to auscultation. HEART: Regular rate and rhythm, distant heart sounds, unable to   appreciate any murmur or rub. Normal S1, S2.   ABDOMEN: Soft, nontender, PD catheter in place in the left lower   quadrant, bowel sounds present. EXTREMITIES: No clubbing, cyanosis, or significant edema. CENTRAL NERVOUS SYSTEM: She is sleepy but arousable. Oriented x3. No myoclonus.     LABORATORY DATA: WBC slightly elevated at 11.6, hemoglobin of   9.2, normal platelets. BUN 34, creatinine 16.2. Other electrolytes are   acceptable. Albumin of 2.1. LFTs are nearly normal. Troponin I is also   normal. Beta hCG was negative. Lactic acid 1.2. CT of the head   without any acute process. CT of the pelvis showed no evidence of   abscess in the left thigh or hip. Bilateral DJD of hips. Portable chest x-  ray, no acute cardiopulmonary process. ASSESSMENT:   1. End-stage renal disease on continuous cycling peritoneal dialysis. 2. Recent peritonitis as outpatient. She is still on intraperitoneal   antibiotics through her peritoneal dialysis exchange. She takes   ceftazidime at home. 3. Hypotension/syncope. Unclear etiology. Blood pressure was low on   admission, which has improved after hydration. We will need to avoid   any aggressive ultrafiltration with peritoneal dialysis. 4. Anemia of end-stage renal disease. 5. Failed renal transplant in November of 2016.   6. Secondary hyperparathyroidism with a history of parathyroidectomy   and hypocalcemia. RECOMMENDATIONS:   1. We will do CAPD in-house. We will use 1.5% Dianeal 5 times per   day. 2. Minimize UF for now. She is not fluid overloaded. Need to avoid   hypotension. 3. Monitor I's and O's with the CAPD exchanges. 4. Add Epogen for treatment of anemia. 5. I had significant difficulty in placing IP ceftazidime order for the   peritonitis. Discussed with pharmacy over phone. We will be using   ceftazidime 2 g IP in one of the PD exchanges per day. Her treatment   is to last through August 13. She does not need IV ceftazidime in each   exchange, but only 1 of the exchanges per day. 6. Continue her home medications including Renvela and calcitriol. 7. Follow blood cultures. 8. Check a.m. cortisol. Thanks for the renal consultation. We will follow the patient with you.         MD SCOTT Bell / Kash Khan   D:  08/09/2017   12:41   T:  08/09/2017 13:47   Job #:  [id number]

## 2017-08-10 VITALS
RESPIRATION RATE: 13 BRPM | WEIGHT: 226.63 LBS | HEART RATE: 92 BPM | BODY MASS INDEX: 42.79 KG/M2 | TEMPERATURE: 97.9 F | DIASTOLIC BLOOD PRESSURE: 61 MMHG | SYSTOLIC BLOOD PRESSURE: 105 MMHG | OXYGEN SATURATION: 100 % | HEIGHT: 61 IN

## 2017-08-10 LAB
ANION GAP BLD CALC-SCNC: 14 MMOL/L (ref 5–15)
APPEARANCE FLD: CLEAR
BASOPHILS # BLD AUTO: 0.1 K/UL (ref 0–0.1)
BASOPHILS # BLD: 1 % (ref 0–1)
BUN SERPL-MCNC: 40 MG/DL (ref 6–20)
BUN/CREAT SERPL: 2 (ref 12–20)
CALCIUM SERPL-MCNC: 8 MG/DL (ref 8.5–10.1)
CHLORIDE SERPL-SCNC: 98 MMOL/L (ref 97–108)
CO2 SERPL-SCNC: 24 MMOL/L (ref 21–32)
COLOR FLD: COLORLESS
CORTIS AM PEAK SERPL-MCNC: 12.2 UG/DL (ref 4.3–22.4)
CREAT SERPL-MCNC: 16.9 MG/DL (ref 0.55–1.02)
DIFFERENTIAL METHOD BLD: ABNORMAL
EOSINOPHIL # BLD: 0.2 K/UL (ref 0–0.4)
EOSINOPHIL NFR BLD: 3 % (ref 0–7)
ERYTHROCYTE [DISTWIDTH] IN BLOOD BY AUTOMATED COUNT: 18.8 % (ref 11.5–14.5)
GLUCOSE SERPL-MCNC: 93 MG/DL (ref 65–100)
HCT VFR BLD AUTO: 29.8 % (ref 35–47)
HGB BLD-MCNC: 9.1 G/DL (ref 11.5–16)
LEVETIRACETAM SERPL-MCNC: 29.1 UG/ML (ref 10–40)
LYMPHOCYTES # BLD AUTO: 11 % (ref 12–49)
LYMPHOCYTES # BLD: 0.8 K/UL (ref 0.8–3.5)
LYMPHOCYTES NFR FLD: 20 %
MCH RBC QN AUTO: 29.1 PG (ref 26–34)
MCHC RBC AUTO-ENTMCNC: 30.5 G/DL (ref 30–36.5)
MCV RBC AUTO: 95.2 FL (ref 80–99)
MONOCYTES # BLD: 0.8 K/UL (ref 0–1)
MONOCYTES NFR BLD AUTO: 11 % (ref 5–13)
MONOS+MACROS NFR FLD: 51 %
NEUTS BAND NFR BLD MANUAL: 1 % (ref 0–6)
NEUTS SEG # BLD: 5.6 K/UL (ref 1.8–8)
NEUTS SEG NFR BLD AUTO: 73 % (ref 32–75)
NEUTS SEG NFR FLD: 29 %
NUC CELL # FLD: 102 /CU MM (ref 0–5)
PATH REV BLD -IMP: ABNORMAL
PLATELET # BLD AUTO: 288 K/UL (ref 150–400)
POTASSIUM SERPL-SCNC: 3.2 MMOL/L (ref 3.5–5.1)
RBC # BLD AUTO: 3.13 M/UL (ref 3.8–5.2)
RBC # FLD: 17 /CU MM
RBC MORPH BLD: ABNORMAL
SODIUM SERPL-SCNC: 136 MMOL/L (ref 136–145)
SPECIMEN SOURCE FLD: ABNORMAL
WBC # BLD AUTO: 7.5 K/UL (ref 3.6–11)

## 2017-08-10 PROCEDURE — 74011250636 HC RX REV CODE- 250/636: Performed by: INTERNAL MEDICINE

## 2017-08-10 PROCEDURE — 36415 COLL VENOUS BLD VENIPUNCTURE: CPT | Performed by: FAMILY MEDICINE

## 2017-08-10 PROCEDURE — 82533 TOTAL CORTISOL: CPT | Performed by: INTERNAL MEDICINE

## 2017-08-10 PROCEDURE — 65390000012 HC CONDITION CODE 44 OBSERVATION

## 2017-08-10 PROCEDURE — 90945 DIALYSIS ONE EVALUATION: CPT

## 2017-08-10 PROCEDURE — 85025 COMPLETE CBC W/AUTO DIFF WBC: CPT | Performed by: FAMILY MEDICINE

## 2017-08-10 PROCEDURE — A4722 DIALYS SOL FLD VOL > 1999CC: HCPCS | Performed by: INTERNAL MEDICINE

## 2017-08-10 PROCEDURE — 80048 BASIC METABOLIC PNL TOTAL CA: CPT | Performed by: FAMILY MEDICINE

## 2017-08-10 PROCEDURE — 99218 HC RM OBSERVATION: CPT

## 2017-08-10 PROCEDURE — 74011250637 HC RX REV CODE- 250/637: Performed by: FAMILY MEDICINE

## 2017-08-10 RX ADMIN — CALCITRIOL 0.5 MCG: 0.25 CAPSULE, LIQUID FILLED ORAL at 09:09

## 2017-08-10 RX ADMIN — CALCIUM CARBONATE (ANTACID) CHEW TAB 500 MG 200 MG: 500 CHEW TAB at 14:17

## 2017-08-10 RX ADMIN — Medication 10 ML: at 06:45

## 2017-08-10 RX ADMIN — SEVELAMER CARBONATE 1600 MG: 800 TABLET, FILM COATED ORAL at 07:09

## 2017-08-10 RX ADMIN — SODIUM CHLORIDE, SODIUM LACTATE, CALCIUM CHLORIDE, MAGNESIUM CHLORIDE AND DEXTROSE 2000 ML: 1.5; 538; 448; 18.3; 5.08 INJECTION, SOLUTION INTRAPERITONEAL at 11:23

## 2017-08-10 RX ADMIN — Medication 250 MCG: at 09:09

## 2017-08-10 RX ADMIN — SODIUM CHLORIDE, SODIUM LACTATE, CALCIUM CHLORIDE, MAGNESIUM CHLORIDE AND DEXTROSE 2000 ML: 1.5; 538; 448; 18.3; 5.08 INJECTION, SOLUTION INTRAPERITONEAL at 06:45

## 2017-08-10 RX ADMIN — CALCIUM CARBONATE (ANTACID) CHEW TAB 500 MG 200 MG: 500 CHEW TAB at 09:09

## 2017-08-10 RX ADMIN — SEVELAMER CARBONATE 1600 MG: 800 TABLET, FILM COATED ORAL at 14:17

## 2017-08-10 NOTE — PROGRESS NOTES
Bedside and Verbal shift change report given to [address] (oncoming nurse) by Paul Treviño RN (offgoing nurse). Report included the following information SBAR, Kardex and Recent Results.

## 2017-08-10 NOTE — PROGRESS NOTES
Name: Tristian Garcia   MRN: [id number]  : 1980      ASSESSMENT:  ESRD on CCPD (Dr. Wes Ruvalcaba at [facility])  Recent Peritonitis as outpt- pt on IP Ceftazidime thru . Gets in one exchange daily; Hypotension/Syncope-might have been dried out with more UF she was trying to do with PD. Spoke with Dr. Wes Ruvalcaba from [facility] yesterday  We used 1.5% dextrose due to hypotension; explained to pt, who wanted 2.5% PD exchange  Anemia of ESRD  Failed Renal Tx in AV'  SHPT; hx of parathyroidectomy and hypocalcemia     PLAN:  Ok for d/c from renal standpoint. D/w Dr. Retana Sides  If she is still here, we can alternate 1.5% and 2.5% today  The current PD exchange can be left in and pt will drain out once she reaches home  She will resume outpt CCPD tonight, after she verifies with her PD nurse  Suggested to use 1 bag of 1.5% and another one of 2.5% tonight  She will ct Ceftazidime as before   No change in home meds  Suggest to f/u with Ortho as outpt for b/l hip DJD    Subjective:  Feels better.  havent asked for any pain meds per RN, but asked lots of questions about hip pain, pain meds, why tylenol is not enough etc      ROS:   No nausea, no vomiting  No chest pain, no shortness of breath    Exam:  Visit Vitals    /61    Pulse 92    Temp 97.9 °F (36.6 °C)    Resp 13    Ht 5' 1\" (1.549 m)    Wt 102.8 kg (226 lb 10 oz)    LMP 2017    SpO2 100%    BMI 42.82 kg/m2       NAD  No edema  AOx3    Current Facility-Administered Medications   Medication Dose Route Frequency Last Dose    sodium chloride (NS) flush 5-10 mL  5-10 mL IntraVENous Q8H 10 mL at 08/10/17 0645    sodium chloride (NS) flush 5-10 mL  5-10 mL IntraVENous PRN 10 mL at 17 1529    calcitRIOL (ROCALTROL) capsule 0.5 mcg  0.5 mcg Oral BID 0.5 mcg at 08/10/17 0909    calcium carbonate (TUMS) chewable tablet 200 mg [elemental]  200 mg Oral  mg at 08/10/17 0909    cyanocobalamin (VITAMIN B12) tablet 250 mcg  250 mcg Oral DAILY 250 mcg at 08/10/17 0909    ergocalciferol (ERGOCALCIFEROL) capsule 50,000 Units  50,000 Units Oral Q7D 50,000 Units at 08/09/17 1045    levETIRAcetam (KEPPRA) tablet 1,000 mg  1,000 mg Oral QHS 1,000 mg at 08/09/17 2209    sevelamer carbonate (RENVELA) tab 1,600 mg  1,600 mg Oral TID 1,600 mg at 08/10/17 0709    peritoneal dialysis DEXTROSE 1.5% (2.5 mEq/L low calcium) solution 2,000 mL  2,000 mL IntraPERitoneal QID 2,000 mL at 08/10/17 1123    peritoneal dialysis with additives   IntraPERitoneal QHS      epoetin haylie (EPOGEN;PROCRIT) injection 10,000 Units  10,000 Units SubCUTAneous Q7D 10,000 Units at 08/09/17 1528       Labs/Data:    Lab Results   Component Value Date/Time    WBC 7.5 08/10/2017 05:15 AM    Hemoglobin (POC) 10.9 08/09/2017 01:38 AM    HGB 9.1 08/10/2017 05:15 AM    Hematocrit (POC) 32 08/09/2017 01:38 AM    HCT 29.8 08/10/2017 05:15 AM    PLATELET 762 92/56/8955 05:15 AM    MCV 95.2 08/10/2017 05:15 AM       Lab Results   Component Value Date/Time    Sodium 136 08/10/2017 05:15 AM    Potassium 3.2 08/10/2017 05:15 AM    Chloride 98 08/10/2017 05:15 AM    CO2 24 08/10/2017 05:15 AM    Anion gap 14 08/10/2017 05:15 AM    Glucose 93 08/10/2017 05:15 AM    BUN 40 08/10/2017 05:15 AM    Creatinine 16.90 08/10/2017 05:15 AM    BUN/Creatinine ratio 2 08/10/2017 05:15 AM    GFR est AA 3 08/10/2017 05:15 AM    GFR est non-AA 2 08/10/2017 05:15 AM    Calcium 8.0 08/10/2017 05:15 AM       Wt Readings from Last 3 Encounters:   08/10/17 102.8 kg (226 lb 10 oz)   05/09/17 120 kg (264 lb 8.8 oz)   08/14/12 71.9 kg (158 lb 9.6 oz)         Intake/Output Summary (Last 24 hours) at 08/10/17 1315  Last data filed at 08/10/17 0651   Gross per 24 hour   Intake                0 ml   Output             3400 ml   Net            -3400 ml       Patient seen and examined.  Chart reviewed. Labs, data and other pertinent notes reviewed in last 24 hrs.     PMH/SH/FH reviewed and unchanged compared to H&P    Discussed with pt, RN, Dr. Julia Jon, Dr. Karley Freeman pt and  at length about pain meds, DJD, ortho f/u etc  TT 35 min, >50% in c/c of care      Myron Hermosillo MD

## 2017-08-10 NOTE — PROGRESS NOTES
MD Jamee Phan with Nephrology stated that he would be in later today to speak to the patient regarding her Peritoneal Dialysis (dextrose concentrations).

## 2017-08-10 NOTE — PROGRESS NOTES
I have reviewed discharge instructions with the patient. The patient verbalized understanding. Transition RN to the Bedside to assist Primary RN with patient education, medication educations, discharge instructions, and stroke core measure compliance. Discharge concerns initiated and discussed with patient, including clarification on \"who\" assists the patient at their home and instructions for when the home going patient should call their provider after discharge. Opportunity for questions and clarification was provided. Patient receptive to education: YES  Patient stated: My aunt is on the way to pick me up  Barriers to Education: none  Diagnosis Education given:  NO    Length of stay: 1  Expected Day of Discharge: today  Ask if they have \"Help at Home\" & add to white board?   NO    Education Day #: 1    Medication Education Given:  YES  M in the box Medication name: coumadin, keppra      Stroke Education documented in Patient Education: NO  Core Measures Documented in Connect Care:  Risk Factors: NO  Warning signs of stroke: NO  When to Activate 911: NO  Medication Education for Risk Factors: NO  Smoking cessation if applicable: NO  Written Education Given:  NO    Discharge NIH Completed: NO  Score: n/a    BRAINS: NO    Follow Up Appointment Made: NO  Date/Time if applicable: to be made by patient to PCP and coumadin clinic (8/11)

## 2017-08-10 NOTE — PROGRESS NOTES
Care Management Interventions  PCP Verified by CM: Yes (Patient's PCP has left [facility] according to gurjit.)  Last Visit to PCP: 06/09/17  Palliative Care Consult (Criteria: CHF and RRAT>21):  (Patient to go home and resume dialysis M, W, F at [facility]:  [phone])  Mode of Transport at Discharge: Other (see comment) (.)  Transition of Care Consult (CM Consult): Other (Dialysis - Resumption of Care.)  MyChart Signup: No  Discharge Durable Medical Equipment: No  Physical Therapy Consult: No  Occupational Therapy Consult: No  Speech Therapy Consult: No  Current Support Network: Lives with Spouse, Other (Apartment with @ 10 steps to enter.)  Confirm Follow Up Transport: Family  Plan discussed with Pt/Family/Caregiver: Yes  Freedom of Choice Offered: Yes  Discharge Location  Discharge Placement: Home    Mrs. Theo Santos is a home dialysis patient followed by [facility] ( [phone])  Spoke with Sarah Freeman,  at the dialysis center who requests clinical updates to be sent to fax:  [phone]. (items included - face sheet, consults, MAR, Kardex, H&P) Patient is on a M, W, F, schedule and is currently getting supplies for PD from [employer]. [facility] and [employer] coordinate patient care needs and are familiar with Mrs. Theo Santos. In speaking with patient and her mother at the bedside, she reports her  usually provides transportation to and from follow up appointments, however PCP information is incorrect. Dr. Casandra Hay is listed on the face sheet as PCP. Patient states she last saw Dr. Farideh Moran in April, however this physician has since left [facility] per patient. Mrs. Theo Santos states she is currently in the process of trying to find another PCP.      Address is incorrect and will be updated in system to:    [address], [facility] [address] [facility]    Phone numbers are correct    Patient plans to return to her apartment where she lives on the 2nd floor and estimates having about 10 steps to enter. She is currently getting her prescriptions filled at [facility] and some are delivered by mail order through a [email or web address]. Will update [facility] on changes with dialysis solution as patient was found to be in fluid overload upon admission to hospital during this episode. *Nephrology Consult to be included in clinical packet via fax:  [phone] (listed above)*      Michael Glynn RN    Update 8/10 11:30 AM:  Fax confirmation [facility]

## 2017-08-10 NOTE — INTERDISCIPLINARY ROUNDS
IDR/SLIDR Summary          Patient: Cristina Rodriguez MRN: [id number]    Age: 39 y.o. YOB: 1980 Room/Bed: [facility]   Admit Diagnosis: Shock ([facility]  Hypotension  Syncope  Tachycardia  Leukocytosis  Bilateral hip pain  ESRD (end stage renal disease) ([facility]  Principal Diagnosis: Shock ([facility]   Goals: Peritoneal dialysis;maintain BP WNL  Readmission: NO  Quality Measure: {Quality Measures:91164}  VTE Prophylaxis: Mechanical  Influenza Vaccine screening completed? YES  Pneumococcal Vaccine screening completed? YES  Mobility needs: Yes   Nutrition plan:Yes  Consults:P.T, O.T., Respiratory and Case Management    Financial concerns:Yes  Escalated to CM? {YES/NO:67873}  RRAT Score: ***   Interventions:{Intervention:78682}  Testing due for pt today?  YES  LOS: 1 days Expected length of stay *** days  Discharge plan: Home   PCP: Mikayla Luevano MD  Transportation needs: Yes    Days before discharge:{Days before Discharge:21344}  Discharge disposition: Home    Signed:     Kirt Bey RN  8/10/2017  5:32 AM

## 2017-08-10 NOTE — DISCHARGE SUMMARY
Discharge Summary     Patient:  Delmy Escalante       MRN: [id number]       YOB: 1980       Age: 39 y.o. Date of admission:  8/9/2017    Date of discharge:  8/10/2017    Primary care provider: Dr. Yany Tian., MD    Admitting provider:  Ron Kay MD    Discharging provider:  Dory Soto [address] [phone]. If unavailable, call [phone] and ask the  to page the triage hospitalist.    Consultations  IP CONSULT TO NEPHROLOGY    Procedures  * No surgery found *    Discharge destination: Home. The patient is stable for discharge. Admission diagnosis  Shock ([facility]  Hypotension  Syncope  Tachycardia  Leukocytosis  Bilateral hip pain  ESRD (end stage renal disease) ([facility]  Hypotension    Current Discharge Medication List      CONTINUE these medications which have NOT CHANGED    Details   !! warfarin (COUMADIN) 5 mg tablet Take 5 mg by mouth. Tues, Thursday      !! warfarin (COUMADIN) 7.5 mg tablet Take 7.5 mg by mouth daily. Sat, Sun, Mon, Wed, Fri      levETIRAcetam (KEPPRA) 1,000 mg tablet Take 1,000 mg by mouth nightly.      ergocalciferol (VITAMIN D2) 50,000 unit capsule Take 50,000 Units by mouth daily. cyanocobalamin (VITAMIN B-12) 500 mcg tablet Take 250 mcg by mouth daily. sevelamer carbonate (RENVELA) 800 mg tab tab Take 1,600 mg by mouth three (3) times daily. potassium chloride (K-DUR, KLOR-CON) 20 mEq tablet Take 10 mEq by mouth daily. calcitRIOL (ROCALTROL) 0.5 mcg capsule Take 0.5 mcg by mouth two (2) times a day. famotidine (PEPCID AC) 20 mg tablet Take 20 mg by mouth daily as needed for Other (indigestion). calcium carbonate (TUMS) 200 mg calcium (500 mg) chew Take 1 Tab by mouth four (4) times daily. Qty: 120 Tab, Refills: 1       !! - Potential duplicate medications found.  Please discuss with provider. Follow-up Information     Follow up With Details Comments [address] [place] [address] MD Deedee Call As needed for primary care [address]  [phone]      [facility] coumadin clinic Schedule an appointment as soon as possible for a visit on 8/11/2017 PT/INR check           Final discharge diagnoses and brief hospital course  Please also refer to the admission H&P for details on the presenting problem. A 48-year-old [facility] American female with past medical history of end-stage renal disease on peritoneal dialysis status post prior failed kidney transplant, seizures and morbid obesity, thromboembolus, long-term anticoagulation therapy on Coumadin presented to the emergency department from home with reported syncope, bilateral hip pain. Syncope (POA) - sounds vasovagal due to coughing and possibly worsened by dehydration and hypotension. Does not sound like a seizure  - CT head 8/9 without acute intracranial abnormality  - Echocardiogram 8/9 with LVEF 60-65%, no WMA  - Monitor with telemetry     Hypotension (POA) - seems to have resolved with fluids, patient notes recent diagnosis of peritonitis and adjustment of diasylate with use of a high glucose solution starting a week ago to remove fluid and switching back to moderate glucose two days ago  - Monitor BP  - Discuss diasylate with regular Nephrology team     Peritonitis (POA) - patient started treatment with ceftazidine in her mid-day diasylate starting a week ago  - Check peritoneal fluid cell count with 102 cells but only 29% neutrophils, likely resolving  - Peritoneal cx 8/9 no growth  - Continue ceftazidine in diasylate     Leukocytosis (POA) - residual peritonitis? Resolved  - Bcx 8/9 no growth  - CXR without acute process  - Hold on empiric antibiotics for now, continue ceftazidine as above     Bilateral Hip pain (POA) - I suspect these are hematomas.  Pain does not localize to findings on CT  - CT pelvis 8/9 without evidence of abscess in the left thigh or hip location but notable inguinal lymph nodes, right greater than left. There is also an oval low-density lesion in the right inguinal region, which may represent a postoperative seroma, and it measures 7.2 x 5 cm. Bilateral hip DJD.  - US of bilateral hips 8/9 with with attention to the left greater than right areas of pain indicated by the patient, shows subcutaneous edema on the left in the area of clinical interest with no fluid collection. Right side is unremarkable sonographically.     Coagulopathy (POA) - likely due to antibiotics potentiating coumadin  - Hold coumadin  - Monitor INR - follow up with PCP for coumadin check     ESRD - continue PD  - Nephrology consulted     Seizures (chronic) - seems unlikely that this was a seizure  - Continue keppra     History of thromboembolism - PE seems unlikely with supratherapeutic INR  - Coumadin as above     Morbid obesity (chronic) - Body mass index is 46.48 kg/(m^2). FOLLOW-UP CARE RECOMMENDATIONS:    FOLLOW UP tests recommended:  Please call the [facility] coumadin clinic and have your INR checked tomorrow, 8/11/17    SYMPTOMS to watch for: fever, chills, nausea, vomiting, diarrhea, abdominal pain. DIET/what to eat:  Renal Diet    ACTIVITY:  Activity as tolerated    DIALYSIS instructions: Please call your dialysis coordinator to discuss the type of diasylate to use given your low blood pressure. What to do if new or unexpected symptoms occur? If you experience any of the above symptoms (or should other concerns or questions arise after discharge) please call your primary care physician. Return to the emergency room if you cannot get hold of your doctor. · It is very important that you keep your follow-up appointment(s). · Please bring discharge papers, medication list (and/or medication bottles) to your follow-up appointments for review by your outpatient provider(s).   · Please check the list of medications and be sure it includes every medication (even non-prescription medications) that your provider wants you to take. · It is important that you take the medication exactly as they are prescribed. · Keep your medication in the bottles provided by the pharmacist and keep a list of the medication names, dosages, and times to be taken in your wallet. · Do not take other medications without consulting your doctor. · If you have any questions about your medications or other instructions, please talk to your nurse or care provider before you leave the hospital.    Physical examination at discharge  Visit Vitals    /61    Pulse 92    Temp 97.9 °F (36.6 °C)    Resp 13    Ht 5' 1\" (1.549 m)    Wt 102.8 kg (226 lb 10 oz)    SpO2 100%    BMI 42.82 kg/m2       Gen - NAD  HEENT - MMM  Neck - supple, full ROM  CV - RRR, no MRG  Resp - lungs CTA b/l, no wheezing, normal WOB  GI - abdomen S, NT, mildly distended, +BS   - no CVA tenderness, bladder non-palpable in lower abdomen  MSK - normal tone and bulk, no edema  Neuro - A&O, no focal deficits  Psych - calm and cooperative with normal affect    Pertinent imaging studies:    CXR 8/9/17  FINDINGS: Single AP portable view of the chest obtained at 00 40 demonstrates a  stable cardiomediastinal silhouette. The lungs are clear bilaterally. No osseous  abnormalities are seen. IMPRESSION: No evidence of acute cardiopulmonary process. CT head 8/9/17  FINDINGS:  The ventricles and sulci are normal in size, shape and configuration and  midline. There is no significant white matter disease. There is no intracranial  hemorrhage, extra-axial collection, mass, mass effect or midline shift. The  basilar cisterns are open. No acute infarct is identified. The bone windows  demonstrate no abnormalities. The visualized portions of the paranasal sinuses  and mastoid air cells are clear.   IMPRESSION: No acute intracranial abnormality.     CT pelvis 8/9/17  FINDINGS:  There is a peritoneal dialysis catheter in the left lower quadrant. There is  diffuse ascites in the visualized lower abdomen. The uterus is noted. Findings  in the right flank suggests a pelvic transplant kidney, and can be correlated  clinically. There is bilateral hip DJD. There is no acute osseous abnormality.    There are prominent inguinal lymph nodes, right greater than left. There are no  focal fluid collections within the soft tissues overlying the pelvis. There is a  oval low-density lesion in the right inguinal region, which may represent a  postoperative seroma, and it measures 7.2 x 5 cm. IMPRESSION:   1. No evidence of abscess in the left thigh or hip location. 2. Evidence of peritoneal dialysis. 3. Bilateral hip DJD. US abdomen 8/9/17  FINDINGS:   Sonography of the bilateral hip soft tissues with attention to the left greater  than right areas of pain indicated by the patient, shows subcutaneous edema on  the left in the area of clinical interest with no fluid collection. Right side  is unremarkable sonographically. Sonography shows the right groin fluid collection seen on CT is a  simple fluid collection in the upper right groin measuring 7.3 x 6.0 x 4.8 cm. There is no associated vascularity or soft tissue component. Since this is close  to a right renal transplant, likely represents seroma. It does not have  sonographic features of abscess or hematoma. IMPRESSION: Simple fluid collection.       Recent Labs      08/10/17   0515  08/09/17   0131   WBC  7.5  11.6*   HGB  9.1*  9.2*   HCT  29.8*  30.2*   PLT  288  380     Recent Labs      08/10/17   0515  08/09/17   0131   NA  136  138   K  3.2*  3.7   CL  98  96*   CO2  24  27   BUN  40*  34*   CREA  16.90*  16.20*   GLU  93  100   CA  8.0*  8.5   MG   --   1.7     Recent Labs      08/09/17 0131   SGOT  40*   AP  74   TP  8.4*   ALB  2.1*   GLOB  6.3*     Recent Labs      08/09/17 0131   INR  4.0*   PTP  40.1*   APTT  56.7*      No results for input(s): FE, TIBC, PSAT, FERR in the last 72 hours. No results for input(s): PH, PCO2, PO2 in the last 72 hours. Recent Labs      08/09/17   0131   CPK  164   CKMB  <1.0     No components found for: [place]    Chronic Diagnoses:    Problem List as of 8/10/2017  Date Reviewed: 8/9/2017          Codes Class Noted - Resolved    Leukocytosis ICD-10-CM: D72.829  ICD-9-CM: 288.60  8/9/2017 - Present        * (Principal)Shock ([facility] ICD-10-CM: R57.9  ICD-9-CM: 785.50  8/9/2017 - Present        Syncope ICD-10-CM: R55  ICD-9-CM: 780.2  8/9/2017 - Present        Tachycardia ICD-10-CM: R00.0  ICD-9-CM: 785.0  8/9/2017 - Present        Hypotension ICD-10-CM: I95.9  ICD-9-CM: 458.9  8/9/2017 - Present        ESRD (end stage renal disease) ([facility] ICD-10-CM: N18.6  ICD-9-CM: 585.6  8/9/2017 - Present        Bilateral hip pain ICD-10-CM: M25.551, M25.552  ICD-9-CM: 719.45  8/9/2017 - Present        Hypocalcemia ICD-10-CM: E83.51  ICD-9-CM: 275.41  5/8/2017 - Present        UTI (lower urinary tract infection) ICD-10-CM: N39.0  ICD-9-CM: 599.0  8/11/2012 - Present        Hypotension, unspecified ICD-10-CM: I95.9  ICD-9-CM: 458.9  8/11/2012 - Present        ESRD on peritoneal dialysis [facility]) ICD-10-CM: N18.6, Z99.2  ICD-9-CM: 585.6, V45.11  6/29/2011 - Present        Chest pain, unspecified ICD-10-CM: R07.9  ICD-9-CM: 786.50  6/28/2011 - Present              Time spent on discharge related activities today greater than 30 minutes.       Signed:  Gregorio Valera MD                 Hospitalist, Internal Medicine      Cc: Haritha Hansen., MD

## 2017-08-10 NOTE — PROGRESS NOTES
CODE 44 delivered, signed, and explained. Observation and Medicare Notices delivered, signed, and explained. Patient given copy of these items and original items placed on patient's hard chart.     Kevin Garay RN

## 2017-08-10 NOTE — DISCHARGE INSTRUCTIONS
Please bring this form with you to show your primary care provider at your follow-up appointment. Primary care provider:  Dr. Alka Townsend MD    Discharging provider:  Raad Sandra MD    You have been admitted to the hospital with the following diagnoses:  · Hypotension  · Syncope  · ESRD (end stage renal disease) ([facility]    FOLLOW-UP CARE RECOMMENDATIONS:    APPOINTMENTS:  Follow-up Information     Follow up With Details Comments [address] [place] [address] Filiberto Ordoñez MD Call As needed for primary care [address]  [phone]      [facility] coumadin clinic Schedule an appointment as soon as possible for a visit on 8/11/2017 PT/INR check         FOLLOW UP tests recommended:  Please call the [facility] coumadin clinic and have your INR checked tomorrow, 8/11/17    SYMPTOMS to watch for: fever, chills, nausea, vomiting, diarrhea, abdominal pain. DIET/what to eat:  Renal Diet    ACTIVITY:  Activity as tolerated    DIALYSIS instructions: Please call your dialysis coordinator to discuss the type of diasylate to use given your low blood pressure. What to do if new or unexpected symptoms occur? If you experience any of the above symptoms (or should other concerns or questions arise after discharge) please call your primary care physician. Return to the emergency room if you cannot get hold of your doctor. · It is very important that you keep your follow-up appointment(s). · Please bring discharge papers, medication list (and/or medication bottles) to your follow-up appointments for review by your outpatient provider(s). · Please check the list of medications and be sure it includes every medication (even non-prescription medications) that your provider wants you to take. · It is important that you take the medication exactly as they are prescribed.    · Keep your medication in the bottles provided by the pharmacist and keep a list of the medication names, dosages, and times to be taken in your wallet. · Do not take other medications without consulting your doctor. · If you have any questions about your medications or other instructions, please talk to your nurse or care provider before you leave the hospital.    I understand that if any problems occur once I am at home I am to contact my physician. These instructions were explained to me and I had the opportunity to ask questions.

## 2017-08-13 LAB
BACTERIA SPEC CULT: NORMAL
GRAM STN SPEC: NORMAL
GRAM STN SPEC: NORMAL
SERVICE CMNT-IMP: NORMAL

## 2017-08-14 LAB
BACTERIA SPEC CULT: NORMAL
SERVICE CMNT-IMP: NORMAL

## 2017-10-27 ENCOUNTER — APPOINTMENT (OUTPATIENT)
Dept: CT IMAGING | Age: 37
DRG: 377 | End: 2017-10-27
Attending: NURSE PRACTITIONER
Payer: MEDICARE

## 2017-10-27 ENCOUNTER — HOSPITAL ENCOUNTER (INPATIENT)
Age: 37
LOS: 4 days | Discharge: HOME | DRG: 377 | End: 2017-10-31
Attending: EMERGENCY MEDICINE | Admitting: INTERNAL MEDICINE
Payer: MEDICARE

## 2017-10-27 ENCOUNTER — APPOINTMENT (OUTPATIENT)
Dept: GENERAL RADIOLOGY | Age: 37
DRG: 377 | End: 2017-10-27
Attending: NURSE PRACTITIONER
Payer: MEDICARE

## 2017-10-27 DIAGNOSIS — K62.5 GASTROINTESTINAL HEMORRHAGE ASSOCIATED WITH ANORECTAL SOURCE: Primary | ICD-10-CM

## 2017-10-27 PROBLEM — E83.59 CALCIPHYLAXIS CUTIS: Status: ACTIVE | Noted: 2017-10-27

## 2017-10-27 PROBLEM — K92.2 LOWER GI BLEED: Status: ACTIVE | Noted: 2017-10-27

## 2017-10-27 LAB
ALBUMIN SERPL-MCNC: 2.3 G/DL (ref 3.5–5)
ALBUMIN/GLOB SERPL: 0.4 {RATIO} (ref 1.1–2.2)
ALP SERPL-CCNC: 149 U/L (ref 45–117)
ALT SERPL-CCNC: 48 U/L (ref 12–78)
ANION GAP SERPL CALC-SCNC: 25 MMOL/L (ref 5–15)
APTT PPP: 28.5 SEC (ref 22.1–32.5)
AST SERPL-CCNC: 33 U/L (ref 15–37)
BASOPHILS # BLD: 0 K/UL (ref 0–0.1)
BASOPHILS NFR BLD: 0 % (ref 0–1)
BILIRUB SERPL-MCNC: 1 MG/DL (ref 0.2–1)
BUN SERPL-MCNC: 52 MG/DL (ref 6–20)
BUN/CREAT SERPL: 4 (ref 12–20)
CALCIUM SERPL-MCNC: 8.2 MG/DL (ref 8.5–10.1)
CHLORIDE SERPL-SCNC: 91 MMOL/L (ref 97–108)
CO2 SERPL-SCNC: 21 MMOL/L (ref 21–32)
CREAT SERPL-MCNC: 12.3 MG/DL (ref 0.55–1.02)
DIFFERENTIAL METHOD BLD: ABNORMAL
EOSINOPHIL # BLD: 0.1 K/UL (ref 0–0.4)
EOSINOPHIL NFR BLD: 1 % (ref 0–7)
ERYTHROCYTE [DISTWIDTH] IN BLOOD BY AUTOMATED COUNT: 17.6 % (ref 11.5–14.5)
ERYTHROCYTE [DISTWIDTH] IN BLOOD BY AUTOMATED COUNT: 17.8 % (ref 11.5–14.5)
GLOBULIN SER CALC-MCNC: 5.6 G/DL (ref 2–4)
GLUCOSE SERPL-MCNC: 88 MG/DL (ref 65–100)
HCT VFR BLD AUTO: 22.8 % (ref 35–47)
HCT VFR BLD AUTO: 24 % (ref 35–47)
HEMOCCULT STL QL: POSITIVE
HGB BLD-MCNC: 7.1 G/DL (ref 11.5–16)
HGB BLD-MCNC: 7.6 G/DL (ref 11.5–16)
INR PPP: 1.2 (ref 0.9–1.1)
LYMPHOCYTES # BLD: 0.7 K/UL (ref 0.8–3.5)
LYMPHOCYTES NFR BLD: 9 % (ref 12–49)
MCH RBC QN AUTO: 28.1 PG (ref 26–34)
MCH RBC QN AUTO: 28.4 PG (ref 26–34)
MCHC RBC AUTO-ENTMCNC: 31.1 G/DL (ref 30–36.5)
MCHC RBC AUTO-ENTMCNC: 31.7 G/DL (ref 30–36.5)
MCV RBC AUTO: 89.6 FL (ref 80–99)
MCV RBC AUTO: 90.1 FL (ref 80–99)
MONOCYTES # BLD: 0.4 K/UL (ref 0–1)
MONOCYTES NFR BLD: 5 % (ref 5–13)
NEUTS SEG # BLD: 6.3 K/UL (ref 1.8–8)
NEUTS SEG NFR BLD: 85 % (ref 32–75)
PLATELET # BLD AUTO: 268 K/UL (ref 150–400)
PLATELET # BLD AUTO: 362 K/UL (ref 150–400)
POTASSIUM SERPL-SCNC: 3.4 MMOL/L (ref 3.5–5.1)
PROT SERPL-MCNC: 7.9 G/DL (ref 6.4–8.2)
PROTHROMBIN TIME: 11.9 SEC (ref 9–11.1)
RBC # BLD AUTO: 2.53 M/UL (ref 3.8–5.2)
RBC # BLD AUTO: 2.68 M/UL (ref 3.8–5.2)
RBC MORPH BLD: ABNORMAL
RBC MORPH BLD: ABNORMAL
SODIUM SERPL-SCNC: 137 MMOL/L (ref 136–145)
THERAPEUTIC RANGE,PTTT: NORMAL SECS (ref 58–77)
WBC # BLD AUTO: 7.2 K/UL (ref 3.6–11)
WBC # BLD AUTO: 7.5 K/UL (ref 3.6–11)

## 2017-10-27 PROCEDURE — 85025 COMPLETE CBC W/AUTO DIFF WBC: CPT | Performed by: NURSE PRACTITIONER

## 2017-10-27 PROCEDURE — 80053 COMPREHEN METABOLIC PANEL: CPT | Performed by: NURSE PRACTITIONER

## 2017-10-27 PROCEDURE — 36415 COLL VENOUS BLD VENIPUNCTURE: CPT | Performed by: NURSE PRACTITIONER

## 2017-10-27 PROCEDURE — 99284 EMERGENCY DEPT VISIT MOD MDM: CPT

## 2017-10-27 PROCEDURE — 65270000029 HC RM PRIVATE

## 2017-10-27 PROCEDURE — 74020 XR ABD FLAT/ ERECT: CPT

## 2017-10-27 PROCEDURE — 85730 THROMBOPLASTIN TIME PARTIAL: CPT | Performed by: NURSE PRACTITIONER

## 2017-10-27 PROCEDURE — 85027 COMPLETE CBC AUTOMATED: CPT | Performed by: INTERNAL MEDICINE

## 2017-10-27 PROCEDURE — 74176 CT ABD & PELVIS W/O CONTRAST: CPT

## 2017-10-27 PROCEDURE — 82272 OCCULT BLD FECES 1-3 TESTS: CPT | Performed by: NURSE PRACTITIONER

## 2017-10-27 PROCEDURE — 77030018836 HC SOL IRR NACL ICUM -A

## 2017-10-27 PROCEDURE — 85610 PROTHROMBIN TIME: CPT | Performed by: NURSE PRACTITIONER

## 2017-10-27 PROCEDURE — 74011250637 HC RX REV CODE- 250/637: Performed by: INTERNAL MEDICINE

## 2017-10-27 RX ORDER — SEVELAMER CARBONATE 800 MG/1
1600 TABLET, FILM COATED ORAL 3 TIMES DAILY
Status: DISCONTINUED | OUTPATIENT
Start: 2017-10-27 | End: 2017-10-31 | Stop reason: HOSPADM

## 2017-10-27 RX ORDER — SORBITOL SOLUTION 70 %
30 SOLUTION, ORAL MISCELLANEOUS 4 TIMES DAILY
Status: DISPENSED | OUTPATIENT
Start: 2017-10-28 | End: 2017-10-29

## 2017-10-27 RX ORDER — MORPHINE SULFATE 10 MG/ML
5 INJECTION, SOLUTION INTRAMUSCULAR; INTRAVENOUS
Status: DISCONTINUED | OUTPATIENT
Start: 2017-10-27 | End: 2017-10-31 | Stop reason: HOSPADM

## 2017-10-27 RX ORDER — HYDROCORTISONE 25 MG/G
CREAM TOPICAL 2 TIMES DAILY
Status: DISCONTINUED | OUTPATIENT
Start: 2017-10-27 | End: 2017-10-31 | Stop reason: HOSPADM

## 2017-10-27 RX ORDER — MIDODRINE HYDROCHLORIDE 5 MG/1
5 TABLET ORAL
Status: DISCONTINUED | OUTPATIENT
Start: 2017-10-27 | End: 2017-10-27

## 2017-10-27 RX ORDER — MIDODRINE HYDROCHLORIDE 5 MG/1
5 TABLET ORAL
COMMUNITY

## 2017-10-27 RX ORDER — MIDODRINE HYDROCHLORIDE 5 MG/1
5 TABLET ORAL
Status: DISCONTINUED | OUTPATIENT
Start: 2017-10-27 | End: 2017-10-31 | Stop reason: HOSPADM

## 2017-10-27 RX ORDER — IBUPROFEN 400 MG/1
400 TABLET ORAL
Status: DISCONTINUED | OUTPATIENT
Start: 2017-10-27 | End: 2017-10-28

## 2017-10-27 RX ORDER — LANOLIN ALCOHOL/MO/W.PET/CERES
250 CREAM (GRAM) TOPICAL DAILY
Status: DISCONTINUED | OUTPATIENT
Start: 2017-10-28 | End: 2017-10-29

## 2017-10-27 RX ORDER — DIAZEPAM 5 MG/1
5 TABLET ORAL
COMMUNITY

## 2017-10-27 RX ORDER — LEVETIRACETAM 500 MG/1
1000 TABLET ORAL
Status: DISCONTINUED | OUTPATIENT
Start: 2017-10-27 | End: 2017-10-31 | Stop reason: HOSPADM

## 2017-10-27 RX ORDER — HYDROCODONE BITARTRATE AND ACETAMINOPHEN 5; 325 MG/1; MG/1
1 TABLET ORAL
Status: DISCONTINUED | OUTPATIENT
Start: 2017-10-27 | End: 2017-10-31 | Stop reason: HOSPADM

## 2017-10-27 RX ORDER — SODIUM CHLORIDE 0.9 % (FLUSH) 0.9 %
5-10 SYRINGE (ML) INJECTION EVERY 8 HOURS
Status: DISCONTINUED | OUTPATIENT
Start: 2017-10-27 | End: 2017-10-31 | Stop reason: HOSPADM

## 2017-10-27 RX ORDER — SODIUM CHLORIDE 0.9 % (FLUSH) 0.9 %
5-10 SYRINGE (ML) INJECTION AS NEEDED
Status: DISCONTINUED | OUTPATIENT
Start: 2017-10-27 | End: 2017-10-31 | Stop reason: HOSPADM

## 2017-10-27 RX ORDER — ONDANSETRON 4 MG/1
4 TABLET, ORALLY DISINTEGRATING ORAL
COMMUNITY

## 2017-10-27 RX ORDER — POLYETHYLENE GLYCOL 3350, SODIUM SULFATE ANHYDROUS, SODIUM BICARBONATE, SODIUM CHLORIDE, POTASSIUM CHLORIDE 236; 22.74; 6.74; 5.86; 2.97 G/4L; G/4L; G/4L; G/4L; G/4L
4000 POWDER, FOR SOLUTION ORAL ONCE
Status: COMPLETED | OUTPATIENT
Start: 2017-10-29 | End: 2017-10-29

## 2017-10-27 RX ORDER — CLINDAMYCIN HYDROCHLORIDE 300 MG/1
300 CAPSULE ORAL 4 TIMES DAILY
COMMUNITY
Start: 2017-10-26 | End: 2017-11-03

## 2017-10-27 RX ADMIN — LEVETIRACETAM 1000 MG: 500 TABLET, FILM COATED ORAL at 23:02

## 2017-10-27 RX ADMIN — IBUPROFEN 400 MG: 400 TABLET, FILM COATED ORAL at 18:52

## 2017-10-27 RX ADMIN — Medication 10 ML: at 23:02

## 2017-10-27 RX ADMIN — SEVELAMER CARBONATE 1600 MG: 800 TABLET, FILM COATED ORAL at 18:52

## 2017-10-27 NOTE — ROUTINE PROCESS
TRANSFER - OUT REPORT:    Verbal report given to John Berg RN(name) on [employer]  being transferred to (unit) for routine progression of care       Report consisted of patients Situation, Background, Assessment and   Recommendations(SBAR). Information from the following report(s) SBAR, ED Summary, [facility] and Recent Results was reviewed with the receiving nurse. Lines:   Peripheral IV 10/27/17 Right Antecubital (Active)   Site Assessment Clean, dry, & intact 10/27/2017  1:41 PM   Phlebitis Assessment 0 10/27/2017  1:41 PM   Infiltration Assessment 0 10/27/2017  1:41 PM   Dressing Status Clean, dry, & intact 10/27/2017  1:41 PM   Dressing Type Tape;Transparent 10/27/2017  1:41 PM   Hub Color/Line Status Pink;Capped;Flushed;Patent 10/27/2017  1:41 PM   Action Taken Blood drawn 10/27/2017  1:41 PM        Opportunity for questions and clarification was provided.

## 2017-10-27 NOTE — H&P
[address]   [address]   HISTORY AND PHYSICAL       Name:  Gilbert Lopez   MR#:  [id number]   :  1980   Account #:  [de-identified]        Date of Adm:  10/27/2017       CHIEF COMPLAINT: Blood per rectum. HISTORY OF PRESENT ILLNESS: The patient is a 79-year-old   patient with the past medical history of end-stage renal disease on   peritoneal dialysis, seizure disorder, as well as   calciphylaxis, associated with this event also thromboembolism, at   least on 2 occasions. The patient referred that she was completely   asymptomatic until yesterday when she started noticing a little bit of   blood. Today, early this morning, she noted the full underwear was full   of blood for which she was brought to the hospital. The patient had   been complaining that she had been constipated after she was taking   ibuprofen 800 mg, meaning 4 tablets, 3 times a day, total   tablets around 12 tablets, that was prescribed by her physician in [facility]   for the treatment of her pain of the calciphylaxis. She presented to our   ER and on their rectal examination, she had blood present. ALLERGIES: PHOSLO, THE REACTION IS ASSOCIATED WITH   ANGIOEDEMA. PAST MEDICAL HISTORY: End-stage renal disease, seizure disorder,   she had calciphylaxis, she has morbid obesity, she had a DVT, as well   as pulmonary embolism twice. MEDICATIONS:   1. Midodrine 5 mg 3 times a week prior to the infusion of her   thiosulfate. 2. Eliquis 2.5 mg p.o. b.i.d.   3. Zofran 4 mg as needed. 4. Cleocin 300 four times a day. 5. Sodium thiosulfate infusion. 6. Tums 200.   7. Keppra 1000 daily. 8. Vitamin D 50,000 units weekly. 9. Vitamin B12 500 mcg daily. 10. Renvela 800 mg, she is taking 1600, so 2 tablets b.i.d.   11. Potassium. 12. Pepcid.     SURGICAL HISTORY: The patient had a cholecystectomy done, a   failed renal transplant, , the peritoneal dialysis, [facility] and   the patient believed that she had had an IVC placed filter placement. TOXIC HABITS: Negative. SOCIAL HISTORY: No smoking. No alcohol use. FAMILY HISTORY: Mother healthy, some relative on the mother's side   with diabetes, and there is a history of cancer. She does not know the   type of cancer that the patient has. The father's side is none. REVIEW OF SYSTEMS:   CONSTITUTIONAL: No fever or weight loss. HEENT: No headache, vision problem. GASTROINTESTINAL: Referred to have GERD on occasion. Prior to   this event, no blood per rectum. Referred to have constipation. GENITOURINARY: No dysuria or incontinence. NEUROLOGIC: The patient has a seizure disorder for which she uses   the Keppra; has had no episode. No other finding. PHYSICAL EXAMINATION:   VITAL SIGNS: Blood pressure is 110/69, pulse 98, respirations 16,   temperature is 98.3. GENERAL APPEARANCE: This is a 16-year-old, comfortable-looking,   in no acute distress, comfortable. HEENT: There is no deformity, normal color conjunctivae. Moist oral   mucosa. NECK: Short, but supple. bilateral pulse present. THORAX: Symmetrical expansion without deformity. HEART: Regular rhythm. S1, S2 present. LUNGS: Clear to auscultation. ABDOMEN: Obese, no tenderness. LIMBS: Symmetric with sign of calciphylaxis with an open wound on the   right thigh, as well as another on the left hip. NEUROLOGIC: The patient is conscious, alert, nonfocal.    LABORATORY DATA: WBC count is 7.5, hemoglobin and hematocrit   is 7.6 and 24 with an MCV of 89.6 and a platelet count of 396. Sodium   is 137, potassium 3.4, chloride is 91, CO2 is 21, BUN is 52, creatinine   12.3, calcium is 8.2 with an albumin low of 2.3. Imaging studies done: A KUB, no obstruction or free air. ASSESSMENT AND PLAN:   1. Lower gastrointestinal bleeding. It looks like this is a painless   gastrointestinal bleeding. Still, further studies need to be done.  We will   monitor the patient from the CBC standpoint as well as her vital sign. We will transfuse as needed. 2. Pulmonary embolism and deep venous thrombosis. This patient is a   high risk for those events, so this medication had been stopped at this   time. The decision to start or not to start the blood thinners has to be   weighed in this patient, benefit versus risk, a decision that can be done   in conjunction with her PCP, Nephrology and the pulmonary doctor at   [address] [place] [address] that she follows often. 3. Calciphylaxis. The patient goes to [facility] 3 times a week for the   infusion of sodium thiosulfate that will help with the calciphylaxis. Once   the pharmacist knows the dose, we can reorder this medication and   continue monitoring the patient clinically. 4. The patient has hypokalemia at 3.4, but her creatinine is 4.3.  At this   time, we will continue monitoring the patient from the clinical   standpoint, so no potassium replacement will be done until the patient   has been seen by a nephrologist.   5. Deep venous thrombosis prophylaxis has been given to the patient   with sequential compression devices, also PPI adjusted renal function        MD MERVAT Tamez / [place] [facility]   D:  10/27/2017   17:00   T:  10/27/2017   18:28   Job #:  [id number]

## 2017-10-27 NOTE — IP AVS SNAPSHOT
[address] 
[phone] Patient: Kadi Kingsley MRN: [id number] You are allergic to the following Allergen Reactions Phoslo (Calcium Acetate) Angioedema Recent Documentation Height Weight BMI OB Status Smoking Status 1.549 m 115.5 kg 48.11 kg/m2 Having regular periods Never Smoker Unresulted Labs-Please follow up with your PCP about these lab tests Order Current Status INFLAM BOWEL DISEASE PANEL In process Emergency Contacts  (Rel.) Home Phone Work Phone Mobile Phone Asha Avalos (Parent) [phone] -- [phone] Monika Woods (Spouse) [phone] -- -- About your hospitalization You were admitted on:  October 27, 2017 You last received care in the:  [facility] You were discharged on:  October 31, 2017 Why you were hospitalized Your primary diagnosis was: Lower Gi Bleed Your diagnoses also included:  Esrd On Peritoneal Dialysis (Hcc) Providers Seen During Your Hospitalization Provider Specialty Primary office phone Alida Interiano MD Emergency Medicine [phone] Rosemary De Jesus MD Internal Medicine [phone] Linda Gleason MD Internal Medicine [phone] Jose Clark MD Internal Medicine [phone] Your Primary Care Physician (PCP) Primary Care Physician Office Phone Office Fax Darren Lema [phone] Follow-up Information Follow up With Details Comments Contact Info Trang Rodríguez MD Call As needed for primary care [place] [address] 
[phone] [facility] wound care clinic Go on 11/2/2017 Appointment at 9:00 AM [facility] [address] Phone [phone] Fax [phone] My Medications STOP taking these medications PEPCID AC 20 mg tablet Generic drug:  famotidine TAKE these medications as instructed Instructions Each Dose to Equal  
 Morning Noon Evening Bedtime  
 calcium carbonate 200 mg calcium (500 mg) Chew Commonly known as:  TUMS Your last dose was: Your next dose is: Take 1 Tab by mouth four (4) times daily. 1 Tab  
    
   
   
   
  
 clindamycin 300 mg capsule Commonly known as:  CLEOCIN Your last dose was: Your next dose is: Take 300 mg by mouth four (4) times daily. Started 10/26, 14 day course, for infected calciphylaxis wounds 300 mg  
    
   
   
   
  
 diazePAM 5 mg tablet Commonly known as:  VALIUM Your last dose was: Your next dose is: Take 5 mg by mouth every twelve (12) hours as needed for Anxiety. 5 mg ELIQUIS 2.5 mg tablet Generic drug:  apixaban Your last dose was: Your next dose is: Take 2.5 mg by mouth two (2) times a day. 2.5 mg  
    
   
   
   
  
 folic acid 1 mg tablet Commonly known as:  [employer] Start taking on:  11/1/2017 Your last dose was: Your next dose is: Take 1 Tab by mouth daily. Can be obtained over-the-counter 1 mg HYDROcodone-acetaminophen 5-325 mg per tablet Commonly known as:  Enzo Gooden Your last dose was: Your next dose is: Take 1 Tab by mouth every four (4) hours as needed. Max Daily Amount: 6 Tabs. For Pain. 1 Tab KEPPRA 1,000 mg tablet Generic drug:  levETIRAcetam  
   
Your last dose was: Your next dose is: Take 1,000 mg by mouth nightly. 1000 mg  
    
   
   
   
  
 midodrine 5 mg tablet Commonly known as:  Pam Azul Your last dose was: Your next dose is: Take 5 mg by mouth every Monday, Wednesday, Friday.  Given with IV sodium thiosulfate   Indications: Symptomatic Orthostatic Hypotension 5 mg  
    
   
   
   
  
 ondansetron 4 mg disintegrating tablet Commonly known as:  ZOFRAN ODT Your last dose was: Your next dose is: Take 4 mg by mouth every eight (8) hours as needed for Nausea. 4 mg  
    
   
   
   
  
 pantoprazole 40 mg tablet Commonly known as:  PROTONIX Your last dose was: Your next dose is: Please take 1 tablet twice daily before breakfast and dinner for 1 month. After 1 month, please reduce frequency to once daily before breakfast.  
     
   
   
   
  
 PERITONEAL DIALYSIS WITH ADDITIVES Your last dose was: Your next dose is:    
   
   
 by IntraPERitoneal route two (2) times a day. potassium chloride 20 mEq tablet Commonly known as:  K-DUR, KLOR-CON Your last dose was: Your next dose is: Take 20 mEq by mouth daily. 20 mEq RENVELA 800 mg Tab tab Generic drug:  sevelamer carbonate Your last dose was: Your next dose is: Take 1,600 mg by mouth three (3) times daily. 1600 mg  
    
   
   
   
  
 sodium hypochlorite 0.125 % Soln external solution Commonly known as:  [address] Your last dose was: Your next dose is:    
   
   
 Twice daily cleanse right thigh and left hip open wounds with normal saline, apply and fill with gauze moist with dakins 1/4 solution, cover with dry gauze, secure with medipore tape;  
     
   
   
   
  
 sodium thiosulfate infusion Your last dose was: Your next dose is:    
   
   
 25 mg by IntraVENous route DIALYSIS MON, WED & FRI. Patient is on PD only, gets this at [address] 25 mg  
    
   
   
   
  
 VITAMIN B-12 500 mcg tablet Generic drug:  cyanocobalamin Your last dose was: Your next dose is: Take 250 mcg by mouth daily. 250 mcg VITAMIN D2 50,000 unit capsule Generic drug:  ergocalciferol Your last dose was: Your next dose is: Take 50,000 Units by mouth daily. 95071 Units Where to Get Your Medications Information on where to get these meds will be given to you by the nurse or doctor. ! Ask your nurse or doctor about these medications  
  folic acid 1 mg tablet HYDROcodone-acetaminophen 5-325 mg per tablet  
 pantoprazole 40 mg tablet  
 sodium hypochlorite 0.125 % Soln external solution Discharge Instructions Please bring this form with you to show your primary care provider at your follow-up appointment. Primary care provider:  Dr. Rudean Fleischer., MD 
 
Discharging provider:  Cleo Ryan MD 
 
You have been admitted to the hospital with the following diagnoses: 
· GI bleed · ESRD on peritoneal dialysis ([address] FOLLOW-UP CARE RECOMMENDATIONS: 
 
APPOINTMENTS: 
Follow-up Information Follow up With Details Comments Contact Info Rudean Fleischer., MD Call As needed for primary care [place] [address] 
[phone] [facility] wound care clinic Go on 11/2/2017 Appointment at 9:00 AM Eliezer [address]057 [address] Phone [phone] Fax [phone] SYMPTOMS to watch for: chest pain, shortness of breath, nausea, vomiting, weakness, bleeding, black/tarry stools. DIET/what to eat:  Renal Diet ACTIVITY:  Activity as tolerated WOUND care instructions:  Twice daily cleanse right thigh and left hip open wounds with normal saline, apply and fill with gauze moist with dakins 1/4 solution, cover with dry gauze, secure with medipore tape; 
 
OTHER medication instructions:  Please do not take any NSAID medications (motrin, ibuprofen, aspirin, naprosyn, etc.).  If you have pain or fevers, it is fine to take acetaminophen (Tyelenol) as directed. What to do if new or unexpected symptoms occur? If you experience any of the above symptoms (or should other concerns or questions arise after discharge) please call your primary care physician. Return to the emergency room if you cannot get hold of your doctor. · It is very important that you keep your follow-up appointment(s). · Please bring discharge papers, medication list (and/or medication bottles) to your follow-up appointments for review by your outpatient provider(s). · Please check the list of medications and be sure it includes every medication (even non-prescription medications) that your provider wants you to take. · It is important that you take the medication exactly as they are prescribed. · Keep your medication in the bottles provided by the pharmacist and keep a list of the medication names, dosages, and times to be taken in your wallet. · Do not take other medications without consulting your doctor. · If you have any questions about your medications or other instructions, please talk to your nurse or care provider before you leave the hospital. 
 
I understand that if any problems occur once I am at home I am to contact my physician. These instructions were explained to me and I had the opportunity to ask questions. Discharge Orders None [employer] Announcement We are excited to announce that we are making your provider's discharge notes available to you in [employer]. You will see these notes when they are completed and signed by the physician that discharged you from your recent hospital stay. If you have any questions or concerns about any information you see in [employer], please call the Health Information Department where you were seen or reach out to your Primary Care Provider for more information about your plan of care. Introducing [facility] SERVICES! Lacy Alejandro introduces [employer] patient portal. Now you can access parts of your medical record, email your doctor's office, and request medication refills online. 1. In your internet browser, go to [email or web address] 2. Click on the First Time User? Click Here link in the Sign In box. You will see the New Member Sign Up page. 3. Enter your [employer] Access Code exactly as it appears below. You will not need to use this code after youve completed the sign-up process. If you do not sign up before the expiration date, you must request a new code. · [employer] Access Code: [email or web address] Expires: 11/7/2017 12:39 AM 
 
4. Enter the last four digits of your Social Security Number (xxxx) and Date of Birth (mm/dd/yyyy) as indicated and click Submit. You will be taken to the next sign-up page. 5. Create a [employer] ID. This will be your [employer] login ID and cannot be changed, so think of one that is secure and easy to remember. 6. Create a [employer] password. You can change your password at any time. 7. Enter your Password Reset Question and Answer. This can be used at a later time if you forget your password. 8. Enter your e-mail address. You will receive e-mail notification when new information is available in [address] 9. Click Sign Up. You can now view and download portions of your medical record. 10. Click the Download Summary menu link to download a portable copy of your medical information. If you have questions, please visit the Frequently Asked Questions section of the [employer] website. Remember, [employer] is NOT to be used for urgent needs. For medical emergencies, dial 911. Now available from your iPhone and Android! General Information Please provide this summary of care documentation to your next provider. Patient Signature:  ____________________________________________________________ Date:  ____________________________________________________________  
  
Paulene Greener Provider Signature:  ____________________________________________________________ Date:  ____________________________________________________________

## 2017-10-27 NOTE — PROGRESS NOTES
Primary Nurse Alejo Diaz and Lacy Nava RN performed a dual skin assessment on this patient Impairment noted- see wound doc flow sheet    R inner thigh deep tissue wound. L hip wound.    Will put in wound consult

## 2017-10-27 NOTE — PROGRESS NOTES
Admission Medication Reconciliation:    Information obtained from: Patient, RX Query, chart    Significant PMH/Disease States:   Past Medical History:   Diagnosis Date    Chronic kidney disease     Dialysis patient ([facility]     Seizures ([facility]        Chief Complaint for this Admission:  Rectal bleeding    Allergies:  Phoslo [calcium acetate]    Prior to Admission Medications:   Prior to Admission Medications   Prescriptions Last Dose Informant Patient Reported? Taking? apixaban (ELIQUIS) 2.5 mg tablet 10/27/2017 at am  Yes Yes   Sig: Take 2.5 mg by mouth two (2) times a day. calcium carbonate (TUMS) 200 mg calcium (500 mg) chew 10/27/2017 at am  No Yes   Sig: Take 1 Tab by mouth four (4) times daily. clindamycin (CLEOCIN) 300 mg capsule 10/27/2017 at am  Yes Yes   Sig: Take 300 mg by mouth four (4) times daily. Started 10/26, 14 day course, for infected calciphylaxis wounds   cyanocobalamin (VITAMIN B-12) 500 mcg tablet 10/27/2017 at am  Yes Yes   Sig: Take 250 mcg by mouth daily. diazePAM (VALIUM) 5 mg tablet 9/27/2017 at Unknown time  Yes Yes   Sig: Take 5 mg by mouth every twelve (12) hours as needed for Anxiety.   ergocalciferol (VITAMIN D2) 50,000 unit capsule 10/26/2017 at pm  Yes Yes   Sig: Take 50,000 Units by mouth daily. famotidine (PEPCID AC) 20 mg tablet 10/27/2017 at am  Yes Yes   Sig: Take 20 mg by mouth daily as needed for Other (indigestion). levETIRAcetam (KEPPRA) 1,000 mg tablet 10/26/2017 at pm  Yes Yes   Sig: Take 1,000 mg by mouth nightly. midodrine (PROAMITINE) 5 mg tablet 10/25/2017 at 1000  Yes Yes   Sig: Take 5 mg by mouth every Monday, Wednesday, Friday. Given with IV sodium thiosulfate   Indications: Symptomatic Orthostatic Hypotension   ondansetron (ZOFRAN ODT) 4 mg disintegrating tablet 10/25/2017  Yes Yes   Sig: Take 4 mg by mouth every eight (8) hours as needed for Nausea.    peritoneal dialysis with additives 10/26/2017 at 10/26  Yes Yes   Sig: by IntraPERitoneal route two (2) times a day. potassium chloride (K-DUR, KLOR-CON) 20 mEq tablet 10/27/2017 at Unknown time  Yes Yes   Sig: Take 20 mEq by mouth daily. sevelamer carbonate (RENVELA) 800 mg tab tab 10/27/2017 at Unknown time  Yes Yes   Sig: Take 1,600 mg by mouth three (3) times daily. sodium thiosulfate infusion 10/25/2017  Yes Yes   Sig: by IntraVENous route DIALYSIS MON, WED & FRI. Doesn't recall dose      Facility-Administered Medications: None         Comments/Recommendations: Patient is a reliable historian. Allergies were reviewed and confirmed as PHOSLO (makes lips swell). Please note:  1. Patient is on PERITONEAL DIALYSIS, 2 exchanges daily (one manual, one on machine)  2. Calciphylaxis: patient receives IV sodium thiosulfate M-W-F, during which she also receives Midodrine 5 mg once  with tx (as her pressures drop)  3. Clindamycin: started 10/26 for infected calciphylaxis wounds, has taken five dose (4 yesterday, 1 today--she missed doses due to coming to ER)    Added:  1. Midodrine  2. IV sodium thiosulfate  3. Diazepam (doesn't use often)    I discussed medication and peritoneal dialysis history with Dr. Chris Hernandez.    Thank you for allowing me to participate in the care of your patient. Tom Sierra PharmD, RN #[id number]          Spoke with 6E RN Ijeoma Speaks regarding patient V sodium thiosulfate + midodrine, peritoneal dialysis and the Clindamycin that she was taking out-patient (she will f/u with night shift nurse).

## 2017-10-27 NOTE — ED TRIAGE NOTES
TRIAGE NOTE: Patient reports last normal BM was 4 days ago, constipated ever since. Reports dark red rectal bleeding began yesterday \"heavy like I'm on my cycle\".

## 2017-10-27 NOTE — ED PROVIDER NOTES
HPI Comments: Patient is a 44year old female who comes to the ED today via private vehicle escorted by a family member with c/o rectal bleeding. Patient has a history of ESRD on PD, HTN, failed renal transplant & calciphylaxis who states she developing bleeding from her rectum yesterday. States she has felt constipated for 4 days. About 5 days ago she had 1 episode of diarrhea. Awoke this morning with blood in her underwear. She denies heart palpitations. It is uncomfortable to sit 7 it feels better to stand, but then her back hurts. Takes Aleve daily. Added ibuprofen for pain recently. She believes the ibuprofen caused her constipation. There are no further complaints at this time. PCP: Kristofer Acevedo MD      The history is provided by the patient. No  was used. Past Medical History:   Diagnosis Date    Chronic kidney disease     Dialysis patient ([facility] [address] [facility]     Seizures ([facility] [address]        Past Surgical History:   Procedure Laterality Date    BIOPSY      parathyroid    DELIVERY       HX  SECTION      HX CHOLECYSTECTOMY      HX OTHER SURGICAL      fistula and PD port in stomach     HX TRANSPLANT  [facility]         History reviewed. No pertinent family history. Social History     Social History    Marital status: SINGLE     Spouse name: N/A    Number of children: N/A    Years of education: N/A     Occupational History    Not on file. Social History Main Topics    Smoking status: Never Smoker    Smokeless tobacco: Never Used    Alcohol use No    Drug use: No    Sexual activity: Not on file     Other Topics Concern    Not on file     Social History Narrative         ALLERGIES: Phoslo [calcium acetate]    Review of Systems   Constitutional: Negative for activity change. Gastrointestinal: Positive for anal bleeding, blood in stool, constipation and rectal pain. Genitourinary:        ESRD on PD   Skin: Positive for wound.         Calciphylaxis Hematological: Bruises/bleeds easily. All other systems reviewed and are negative. Vitals:    10/27/17 1109   BP: 108/75   Pulse: (!) 102   Resp: 18   Temp: 97.5 °F (36.4 °C)   SpO2: 100%   Weight: 115.9 kg (255 lb 9.6 oz)   Height: 5' 1\" (1.549 m)            Physical Exam   Constitutional: She is oriented to person, place, and time. She appears well-developed and well-nourished. HENT:   Head: Normocephalic and atraumatic. Cardiovascular: Regular rhythm. Tachycardia present. Exam reveals no gallop and no friction rub. No murmur heard. Pulses:       Radial pulses are 2+ on the right side, and 2+ on the left side. Pulmonary/Chest: Effort normal and breath sounds normal.   Abdominal: Soft. Bowel sounds are normal. She exhibits ascites. She exhibits no shifting dullness, no distension, no pulsatile liver, no fluid wave, no abdominal bruit and no mass. There is no tenderness. There is no rebound, no guarding and no CVA tenderness. Genitourinary: Rectal exam shows external hemorrhoid, tenderness and guaiac positive stool. Rectal exam shows no internal hemorrhoid, no fissure, no mass and anal tone normal.   Neurological: She is alert and oriented to person, place, and time. GCS eye subscore is 4. GCS verbal subscore is 5. GCS motor subscore is 6. Skin: Skin is warm and dry. Lesion noted. Psychiatric: She has a normal mood and affect. Her speech is normal and behavior is normal. Judgment and thought content normal. Cognition and memory are normal.   Nursing note and vitals reviewed. MDM  Number of Diagnoses or Management Options  Gastrointestinal hemorrhage associated with anorectal source:   Diagnosis management comments: Assessment & Plan:   PT/PTT  CMP  CBC with DIFF    Stool blood  KUB  CT abd/pelvis    Discussed with MD Jeff Correia NP  10/27/17  11:46 AM    INR 1.2    HGB 7.6.  Previously 9.1    Positive for occult blood in stool        Anion Gap 25 (previously 14-15)    KUB without air under the diaphragm  CT Abd/pelvis (non-contrasted) with chronic changes    GI consult  Roseline Brown NP  10/27/17  2:35 PM    Spoke with Troy Nesbitt with GI. Have her admitted for serial hgb and possible scope. She will be down to see shortly. Consult to hospitalist    Roseline Brown NP  10/27/17  3:23 PM    Spoke to Dr. Enrique Salcedo. Admit to medicine    4:09 PM  Patient is being admitted to the hospital.  The results of their tests and reasons for their admission have been discussed with them and/or available family. They convey agreement and understanding for the need to be admitted and for their admission diagnosis. Consultation has been made with the inpatient physician specialist for hospitalization.     LABORATORY TESTS:  Recent Results (from the past 12 hour(s))  -OCCULT BLOOD, STOOL  Collection Time: 10/27/17  1:17 PM       Result                                            Value                         Ref Range                       Occult blood, stool                               POSITIVE (A)                  NEG                        -CBC WITH AUTOMATED DIFF  Collection Time: 10/27/17  1:17 PM       Result                                            Value                         Ref Range                       WBC                                               7.5                           3.6 - 11.0 K/uL                 RBC                                               2.68 (L)                      3.80 - 5.20 M/uL                HGB                                               7.6 (L)                       11.5 - 16.0 g/dL                HCT                                               24.0 (L)                      35.0 - 47.0 %                   MCV                                               89.6                          80.0 - 99.0 FL                  MCH                                               28.4                          26.0 - 34.0 PG MCHC                                              31.7                          30.0 - 36.5 g/dL                RDW                                               17.8 (H)                      11.5 - 14.5 %                   PLATELET                                          362                           150 - 400 K/uL                  NEUTROPHILS                                       85 (H)                        32 - 75 %                       LYMPHOCYTES                                       9 (L)                         12 - 49 %                       MONOCYTES                                         5                             5 - 13 %                        EOSINOPHILS                                       1                             0 - 7 %                         BASOPHILS                                         0                             0 - 1 %                         ABS. NEUTROPHILS                                  6.3                           1.8 - 8.0 K/UL                  ABS. LYMPHOCYTES                                  0.7 (L)                       0.8 - 3.5 K/UL                  ABS. MONOCYTES                                    0.4                           0.0 - 1.0 K/UL                  ABS. EOSINOPHILS                                  0.1                           0.0 - 0.4 K/UL                  ABS.  BASOPHILS                                    0.0                           0.0 - 0.1 K/UL                  DF                                                SMEAR SCANNED                                                 RBC COMMENTS                                      ANISOCYTOSIS 1+                                               RBC COMMENTS                                      POLYCHROMASIA PRESENT                                    -METABOLIC PANEL, COMPREHENSIVE  Collection Time: 10/27/17  1:17 PM       Result                                            Value Ref Range                       Sodium                                            137                           136 - 145 mmol/L                Potassium                                         3.4 (L)                       3.5 - 5.1 mmol/L                Chloride                                          91 (L)                        97 - 108 mmol/L                 CO2                                               21                            21 - 32 mmol/L                  Anion gap                                         25 (H)                        5 - 15 mmol/L                   Glucose                                           88                            65 - 100 mg/dL                  BUN                                               52 (H)                        6 - 20 MG/DL                    Creatinine                                        12.30 (H)                     0.55 - 1.02 MG/DL               BUN/Creatinine ratio                              4 (L)                         12 - 20                         GFR est AA                                        4 (L)                         >60 ml/min/1.73m2               GFR est non-AA                                    3 (L)                         >60 ml/min/1.73m2               Calcium                                           8.2 (L)                       8.5 - 10.1 MG/DL                Bilirubin, total                                  1.0                           0.2 - 1.0 MG/DL                 ALT (SGPT)                                        48                            12 - 78 U/L                     AST (SGOT)                                        33                            15 - 37 U/L                     Alk. phosphatase                                  149 (H)                       45 - 117 U/L                    Protein, total                                    7.9                           6.4 - 8.2 g/dL                  Albumin 2.3 (L)                       3.5 - 5.0 g/dL                  Globulin                                          5.6 (H)                       2.0 - 4.0 g/dL                  A-G Ratio                                         0.4 (L)                       1.1 - 2.2                  -PROTHROMBIN TIME + INR  Collection Time: 10/27/17  1:38 PM       Result                                            Value                         Ref Range                       INR                                               1.2 (H)                       0.9 - 1.1                       Prothrombin time                                  11.9 (H)                      9.0 - 11.1 sec             -PTT  Collection Time: 10/27/17  1:38 PM       Result                                            Value                         Ref Range                       aPTT                                              28.5                          22.1 - 32.5 sec                 aPTT, therapeutic range                                                         58.0 - 77.0 SECS             IMAGING RESULTS:  XR ABD FLAT/ ERECT   Final Result     CT ABD PELV WO CONT   Final Result     Xr Abd Flat/ Erect    Result Date: 10/27/2017  EXAM:  XR ABD FLAT/ ERECT INDICATION:   eval for constipation with rectal bleeding COMPARISON: None. FINDINGS: Supine and upright views of the abdomen demonstrate a normal gas pattern. There is no free intraperitoneal air. No soft tissue masses or pathologic calcifications are seen. Degenerative changes of both hips noted. Posttraumatic changes of the symphysis pubis noted. Vena cava filter overlies the mid abdomen. There are surgical clips in the right lower quadrant. .     IMPRESSION: No obstruction or free air. Ct Abd Pelv Wo Cont    Result Date: 10/27/2017  EXAM:  CT ABD PELV WO CONT INDICATION:  Rectal bleeding in pt with PD HD COMPARISON: 11/24/2011 CT abdomen and 8/9/2017 CT pelvis. CONTRAST:  None. TECHNIQUE: Unenhanced multislice helical CT was performed from the diaphragm to the symphysis pubis without oral or intravenous contrast administration. Contiguous 5 mm axial images were reconstructed and lung and soft tissue windows were generated. Coronal and sagittal reformations were generated. CT dose reduction was achieved through use of a standardized protocol tailored for this examination and automatic exposure control for dose modulation. FINDINGS: LUNG: The visualized portions of the lung bases are clear. The absence of intravenous contrast material reduces the sensitivity for evaluation of the solid parenchymal organs of the abdomen. LIVER: Borderline liver is minimally lobulated. GALLBLADDER: Absent SPLEEN: No focal lesion. PANCREAS: No focal lesion. ADRENALS: No focal lesion. KIDNEYS: Native kidneys are bilaterally atrophic similar to the previous exam. Renal transplant is again noted in the right iliac fossa. GI: No obstruction or inflammatory change is identified. . APPENDIX: Unremarkable. AORTA: The aorta tapers without aneurysm. RETROPERITONEUM:  There is no retroperitoneal adenopathy or mass. Vena cava filter noted. PERITONEUM: Ascites is seen throughout the abdomen. Peritoneal dialysis catheter noted in the left lower quadrant. Óscar Reasoner BLADDER: Nearly empty PELVIS: The rounded fluid collection in the lower pelvic abdominal wall and adjacent pelvis is again seen unchanged. This is consistent with a lymphocele or seroma. Óscar Reasoner BONES: No sclerotic or lytic lesions noted. IMPRESSION:  1. Generalized ascites consistent with peritoneal dialysis. 2. Lymphocele or seroma in the right pelvis extending into the right inguinal region again noted. This has not changed.  3. No evidence of bowel obstruction or other bowel abnormality within limits of unenhanced exam.        MEDICATIONS GIVEN:  Medications - No data to display    IMPRESSION:  Gastrointestinal hemorrhage associated with anorectal source  (primary encounter diagnosis)    PLAN:  1.  Admit to Hospitalist    Total critical care time spent exclusive of procedures:  45 minutes      Myles Bamberger, NP         Amount and/or Complexity of Data Reviewed  Clinical lab tests: reviewed and ordered  Tests in the radiology section of CPT®: ordered and reviewed  Tests in the medicine section of CPT®: ordered and reviewed  Discussion of test results with the performing providers: yes  Decide to obtain previous medical records or to obtain history from someone other than the patient: yes  Review and summarize past medical records: yes  Discuss the patient with other providers: yes  Independent visualization of images, tracings, or specimens: yes    Critical Care  Total time providing critical care: 30-74 minutes    Patient Progress  Patient progress: stable    ED Course       Procedures

## 2017-10-27 NOTE — CONSULTS
[address]  [address] [place], [address]  [phone]                     GI CONSULTATION NOTE  Hammad Reveles, [facility]  Work Cell: [phone]      NAME:  Lara Paiz   :   1980   MRN:   [id number]       Referring Provider: Rina Nova NP    Consult Date: 10/27/2017     Chief Complaint: Rectal bleeding    History of Present Illness:  Patient is a 44 y.o. who is seen in consultation at the request of Rina Nova NP for GI bleed. Ms. Astrid Hudson has a PMH as detailed below. She presented the hospital with rectal bleeding. Onset of symptom was yesterday. She reports being constipated for the past 4-5 days. She had been straining but only having small, hard stools. Initially she had a small amount of blood with her stool, but yesterday upon straining she passed a large amount of dark red blood per rectum. This was without any stool. She stated the bleeding continued through all of yesterday. When she awoke this AM she had blood in her underwear. Last good BM was 4-5 days ago. No prior history of similar symptoms. She reports having regular daily BMs prior to onset of symptoms. She believes constipation is related to high doses of ibuprofen which she recently started taking. She reports some associated rectal discomfort. She denies any nausea, vomiting, abdominal pain or black stools. No prior colonoscopy. She denies any family history of colon cancer. Work-up here revealed Hgb 7.6 (slightly below baseline). Non-contrast CT and KUB are unremarkable. She has history of DVT diagnosed in  for which she was taking Coumadin but recently switched to Eliquis due to calciphylaxis.        PMH:  Past Medical History:   Diagnosis Date    Chronic kidney disease     Dialysis patient ([facility] [address]     Seizures ([facility] [address]        PSH:  Past Surgical History:   Procedure Laterality Date    BIOPSY      parathyroid    DELIVERY       HX  SECTION      HX CHOLECYSTECTOMY      HX OTHER SURGICAL      fistula and PD port in stomach     HX TRANSPLANT  [facility]       Allergies: Allergies   Allergen Reactions    Phoslo [Calcium Acetate] Angioedema       Home Medications:  Prior to Admission Medications   Prescriptions Last Dose Informant Patient Reported? Taking? apixaban (ELIQUIS) 2.5 mg tablet 10/27/2017 at 1000  Yes Yes   Sig: Take 2.5 mg by mouth two (2) times a day. calcium carbonate (TUMS) 200 mg calcium (500 mg) chew 10/27/2017 at 1000  No Yes   Sig: Take 1 Tab by mouth four (4) times daily. clindamycin (CLEOCIN) 300 mg capsule 10/27/2017 at am  Yes Yes   Sig: Take 300 mg by mouth four (4) times daily. Started 10/26, 14 day course, for infected calciphylaxis wounds   cyanocobalamin (VITAMIN B-12) 500 mcg tablet 10/27/2017 at Unknown time  Yes Yes   Sig: Take 250 mcg by mouth daily. ergocalciferol (VITAMIN D2) 50,000 unit capsule 10/26/2017 at Unknown time  Yes Yes   Sig: Take 50,000 Units by mouth daily. famotidine (PEPCID AC) 20 mg tablet 10/26/2017 at Unknown time  Yes Yes   Sig: Take 20 mg by mouth daily as needed for Other (indigestion). levETIRAcetam (KEPPRA) 1,000 mg tablet 10/26/2017 at Unknown time  Yes Yes   Sig: Take 1,000 mg by mouth nightly. midodrine (PROAMITINE) 5 mg tablet 10/25/2017 at 1000  Yes Yes   Sig: Take 5 mg by mouth every Monday, Wednesday, Friday. Given with IV sodium thiosulfate   Indications: Symptomatic Orthostatic Hypotension   ondansetron (ZOFRAN ODT) 4 mg disintegrating tablet 10/25/2017  Yes Yes   Sig: Take 4 mg by mouth every eight (8) hours as needed for Nausea. potassium chloride (K-DUR, KLOR-CON) 20 mEq tablet 10/27/2017 at Unknown time  Yes Yes   Sig: Take 20 mEq by mouth daily. sevelamer carbonate (RENVELA) 800 mg tab tab 10/27/2017 at Unknown time  Yes Yes   Sig: Take 1,600 mg by mouth three (3) times daily.    sodium thiosulfate infusion 10/25/2017  Yes Yes   Sig: by IntraVENous route DIALYSIS MON, WED & FRI. Facility-Administered Medications: None       Hospital Medications:  No current facility-administered medications for this encounter. Current Outpatient Prescriptions   Medication Sig    apixaban (ELIQUIS) 2.5 mg tablet Take 2.5 mg by mouth two (2) times a day.  ondansetron (ZOFRAN ODT) 4 mg disintegrating tablet Take 4 mg by mouth every eight (8) hours as needed for Nausea.  clindamycin (CLEOCIN) 300 mg capsule Take 300 mg by mouth four (4) times daily. Started 10/26, 14 day course, for infected calciphylaxis wounds    sodium thiosulfate infusion by IntraVENous route DIALYSIS MON, WED & FRI.  midodrine (PROAMITINE) 5 mg tablet Take 5 mg by mouth every Monday, Wednesday, Friday. Given with IV sodium thiosulfate   Indications: Symptomatic Orthostatic Hypotension    calcium carbonate (TUMS) 200 mg calcium (500 mg) chew Take 1 Tab by mouth four (4) times daily.  levETIRAcetam (KEPPRA) 1,000 mg tablet Take 1,000 mg by mouth nightly.  ergocalciferol (VITAMIN D2) 50,000 unit capsule Take 50,000 Units by mouth daily.  cyanocobalamin (VITAMIN B-12) 500 mcg tablet Take 250 mcg by mouth daily.  sevelamer carbonate (RENVELA) 800 mg tab tab Take 1,600 mg by mouth three (3) times daily.  potassium chloride (K-DUR, KLOR-CON) 20 mEq tablet Take 20 mEq by mouth daily.  famotidine (PEPCID AC) 20 mg tablet Take 20 mg by mouth daily as needed for Other (indigestion). Social History:  Social History   Substance Use Topics    Smoking status: Never Smoker    Smokeless tobacco: Never Used    Alcohol use No       Family History:  History reviewed. No pertinent family history.     Review of Systems:    Constitutional: negative fever, negative chills, negative weight loss  Eyes:   negative visual changes  ENT:   negative sore throat, tongue or lip swelling  Respiratory:  negative cough, negative dyspnea  Cards:  negative for chest pain, palpitations, lower extremity edema  GI:   See HPI  :  negative for frequency, dysuria  Integument:  negative for rash and pruritus  Heme:  negative for easy bruising and gum/nose bleeding  Musculoskel: negative for myalgias, back pain and muscle weakness  Neuro: negative for headaches, dizziness, vertigo  Psych:  negative for feelings of anxiety, depression      Objective:   Patient Vitals for the past 8 hrs:   BP Temp Pulse Resp SpO2 Height Weight   10/27/17 1603 110/69 98.3 °F (36.8 °C) 98 17 98 % - -   10/27/17 1109 108/75 97.5 °F (36.4 °C) (!) 102 18 100 % 5' 1\" (1.549 m) 115.9 kg (255 lb 9.6 oz)               PHYSICAL EXAM:  General: WD, WN. Alert, cooperative, no acute distress.    HEENT: NC, Atraumatic. PERRLA, EOMI. Anicteric sclerae. Lungs:  CTA Bilaterally. No Wheezing/Rhonchi/Rales. Heart:  Regular rate and rhythm, No murmur, No Rubs, No Gallops  Abdomen: Soft, non-distended, non-tender.  +Bowel sounds, no HSM  Rectal:  Small external hemorrhoids noted with minimal amount of red blood, no definite fissure seen due to body habitus but mild tenderness present with exam   Extremities: No c/c/e  Neurologic:  Alert and oriented X 3. No acute neurological distress. Psych:   Good insight. Not anxious nor agitated. Data Review     Recent Labs      10/27/17   1317   WBC  7.5   HGB  7.6*   HCT  24.0*   PLT  362     Recent Labs      10/27/17   1317   NA  137   K  3.4*   CL  91*   CO2  21   BUN  52*   CREA  12.30*   GLU  88   CA  8.2*     Recent Labs      10/27/17   1317   SGOT  33   AP  149*   TP  7.9   ALB  2.3*   GLOB  5.6*     Recent Labs      10/27/17   1338   INR  1.2*   PTP  11.9*   APTT  28.5        Imaging studies reviewed      Assessment:   1. Lower GI bleed - with BRBPR in setting of recent constipation and straining, suspect symptoms likely related to hemorrhoids or fissure, however diverticulosis, polyps and neoplasia cannot be completely excluded. 2. ESRD - on PD  3.  History of DVT - on Eliquis    Patient Active Problem List   Diagnosis Code    Chest pain, unspecified R07.9    ESRD on peritoneal dialysis ([facility] [address] N18.6, Z99.2    UTI (lower urinary tract infection) N39.0    Hypotension, unspecified I95.9    Hypocalcemia E83.51    Leukocytosis D72.829    Shock (HCC) R57.9    Syncope R55    Tachycardia R00.0    Hypotension I95.9    ESRD (end stage renal disease) (HCC) N18.6    Bilateral hip pain M25.551, M25.552    Calciphylaxis cutis E83.59    Lower GI bleed K92.2              Plan:   -Diet as tolerated  -Anusol cream BID  -Sitz baths BID  -Hold Eliquis for now and resume only if necessary   -Avoid NSAIDs   -If bleeding resolves and Hgb remains stable, can complete elective colonoscopy with primary medical team at [facility]  -If bleeding persists, would consider doing colonoscopy Monday  -Monitor H&H and follow clinical course for any evidence of on-going bleeding  -Transfuse as necessary   -Discussed with Dr. Neelima Rod   -Will follow along with you  -Thank you kindly for allowing us to participate in the care of this patient

## 2017-10-28 ENCOUNTER — APPOINTMENT (OUTPATIENT)
Dept: GENERAL RADIOLOGY | Age: 37
DRG: 377 | End: 2017-10-28
Attending: INTERNAL MEDICINE
Payer: MEDICARE

## 2017-10-28 LAB
ANION GAP SERPL CALC-SCNC: 27 MMOL/L (ref 5–15)
BUN SERPL-MCNC: 57 MG/DL (ref 6–20)
BUN/CREAT SERPL: 4 (ref 12–20)
CALCIUM SERPL-MCNC: 7.7 MG/DL (ref 8.5–10.1)
CHLORIDE SERPL-SCNC: 90 MMOL/L (ref 97–108)
CO2 SERPL-SCNC: 20 MMOL/L (ref 21–32)
CREAT SERPL-MCNC: 13.1 MG/DL (ref 0.55–1.02)
ERYTHROCYTE [DISTWIDTH] IN BLOOD BY AUTOMATED COUNT: 17.7 % (ref 11.5–14.5)
ERYTHROCYTE [DISTWIDTH] IN BLOOD BY AUTOMATED COUNT: 17.8 % (ref 11.5–14.5)
FERRITIN SERPL-MCNC: 764 NG/ML (ref 8–252)
FOLATE SERPL-MCNC: 4.2 NG/ML (ref 5–21)
GLUCOSE SERPL-MCNC: 73 MG/DL (ref 65–100)
HCT VFR BLD AUTO: 22.4 % (ref 35–47)
HCT VFR BLD AUTO: 22.5 % (ref 35–47)
HGB BLD-MCNC: 6.9 G/DL (ref 11.5–16)
HGB BLD-MCNC: 7 G/DL (ref 11.5–16)
IRON SATN MFR SERPL: 49 % (ref 20–50)
IRON SERPL-MCNC: 63 UG/DL (ref 35–150)
MCH RBC QN AUTO: 27.6 PG (ref 26–34)
MCH RBC QN AUTO: 28 PG (ref 26–34)
MCHC RBC AUTO-ENTMCNC: 30.7 G/DL (ref 30–36.5)
MCHC RBC AUTO-ENTMCNC: 31.3 G/DL (ref 30–36.5)
MCV RBC AUTO: 89.6 FL (ref 80–99)
MCV RBC AUTO: 90 FL (ref 80–99)
PLATELET # BLD AUTO: 310 K/UL (ref 150–400)
PLATELET # BLD AUTO: 325 K/UL (ref 150–400)
POTASSIUM SERPL-SCNC: 3.1 MMOL/L (ref 3.5–5.1)
RBC # BLD AUTO: 2.5 M/UL (ref 3.8–5.2)
RBC # BLD AUTO: 2.5 M/UL (ref 3.8–5.2)
SODIUM SERPL-SCNC: 137 MMOL/L (ref 136–145)
TIBC SERPL-MCNC: 129 UG/DL (ref 250–450)
VIT B12 SERPL-MCNC: 482 PG/ML (ref 211–911)
WBC # BLD AUTO: 5.9 K/UL (ref 3.6–11)
WBC # BLD AUTO: 6 K/UL (ref 3.6–11)

## 2017-10-28 PROCEDURE — 77030002996 HC SUT SLK J&J -A

## 2017-10-28 PROCEDURE — 86902 BLOOD TYPE ANTIGEN DONOR EA: CPT | Performed by: INTERNAL MEDICINE

## 2017-10-28 PROCEDURE — 74011000250 HC RX REV CODE- 250: Performed by: INTERNAL MEDICINE

## 2017-10-28 PROCEDURE — 83540 ASSAY OF IRON: CPT | Performed by: INTERNAL MEDICINE

## 2017-10-28 PROCEDURE — 36415 COLL VENOUS BLD VENIPUNCTURE: CPT | Performed by: INTERNAL MEDICINE

## 2017-10-28 PROCEDURE — 65270000029 HC RM PRIVATE

## 2017-10-28 PROCEDURE — 86870 RBC ANTIBODY IDENTIFICATION: CPT | Performed by: INTERNAL MEDICINE

## 2017-10-28 PROCEDURE — 90945 DIALYSIS ONE EVALUATION: CPT

## 2017-10-28 PROCEDURE — 74011250636 HC RX REV CODE- 250/636: Performed by: INTERNAL MEDICINE

## 2017-10-28 PROCEDURE — 74011250637 HC RX REV CODE- 250/637: Performed by: FAMILY MEDICINE

## 2017-10-28 PROCEDURE — 74011250637 HC RX REV CODE- 250/637: Performed by: INTERNAL MEDICINE

## 2017-10-28 PROCEDURE — 74011250637 HC RX REV CODE- 250/637: Performed by: SPECIALIST

## 2017-10-28 PROCEDURE — 82746 ASSAY OF FOLIC ACID SERUM: CPT | Performed by: INTERNAL MEDICINE

## 2017-10-28 PROCEDURE — C9113 INJ PANTOPRAZOLE SODIUM, VIA: HCPCS | Performed by: INTERNAL MEDICINE

## 2017-10-28 PROCEDURE — 86905 BLOOD TYPING RBC ANTIGENS: CPT | Performed by: INTERNAL MEDICINE

## 2017-10-28 PROCEDURE — 82607 VITAMIN B-12: CPT | Performed by: INTERNAL MEDICINE

## 2017-10-28 PROCEDURE — 36430 TRANSFUSION BLD/BLD COMPNT: CPT

## 2017-10-28 PROCEDURE — 85027 COMPLETE CBC AUTOMATED: CPT | Performed by: INTERNAL MEDICINE

## 2017-10-28 PROCEDURE — 86922 COMPATIBILITY TEST ANTIGLOB: CPT | Performed by: INTERNAL MEDICINE

## 2017-10-28 PROCEDURE — 71010 XR CHEST PORT: CPT

## 2017-10-28 PROCEDURE — 82728 ASSAY OF FERRITIN: CPT | Performed by: INTERNAL MEDICINE

## 2017-10-28 PROCEDURE — 86920 COMPATIBILITY TEST SPIN: CPT | Performed by: INTERNAL MEDICINE

## 2017-10-28 PROCEDURE — 86900 BLOOD TYPING SEROLOGIC ABO: CPT | Performed by: INTERNAL MEDICINE

## 2017-10-28 PROCEDURE — 30233N1 TRANSFUSION OF NONAUTOLOGOUS RED BLOOD CELLS INTO PERIPHERAL VEIN, PERCUTANEOUS APPROACH: ICD-10-PCS | Performed by: INTERNAL MEDICINE

## 2017-10-28 PROCEDURE — 74011250637 HC RX REV CODE- 250/637: Performed by: SURGERY

## 2017-10-28 PROCEDURE — 86921 COMPATIBILITY TEST INCUBATE: CPT | Performed by: INTERNAL MEDICINE

## 2017-10-28 PROCEDURE — C1751 CATH, INF, PER/CENT/MIDLINE: HCPCS

## 2017-10-28 PROCEDURE — 80048 BASIC METABOLIC PNL TOTAL CA: CPT | Performed by: INTERNAL MEDICINE

## 2017-10-28 PROCEDURE — P9016 RBC LEUKOCYTES REDUCED: HCPCS | Performed by: INTERNAL MEDICINE

## 2017-10-28 PROCEDURE — 74011250637 HC RX REV CODE- 250/637: Performed by: NURSE PRACTITIONER

## 2017-10-28 PROCEDURE — 3E1M39Z IRRIGATION OF PERITONEAL CAVITY USING DIALYSATE, PERCUTANEOUS APPROACH: ICD-10-PCS | Performed by: INTERNAL MEDICINE

## 2017-10-28 PROCEDURE — A4722 DIALYS SOL FLD VOL > 1999CC: HCPCS | Performed by: INTERNAL MEDICINE

## 2017-10-28 RX ORDER — ONDANSETRON 2 MG/ML
4 INJECTION INTRAMUSCULAR; INTRAVENOUS
Status: DISCONTINUED | OUTPATIENT
Start: 2017-10-28 | End: 2017-10-31 | Stop reason: HOSPADM

## 2017-10-28 RX ORDER — POTASSIUM CHLORIDE 750 MG/1
40 TABLET, FILM COATED, EXTENDED RELEASE ORAL
Status: COMPLETED | OUTPATIENT
Start: 2017-10-28 | End: 2017-10-28

## 2017-10-28 RX ORDER — SODIUM CHLORIDE 0.9 % (FLUSH) 0.9 %
10 SYRINGE (ML) INJECTION EVERY 8 HOURS
Status: DISCONTINUED | OUTPATIENT
Start: 2017-10-28 | End: 2017-10-31 | Stop reason: HOSPADM

## 2017-10-28 RX ORDER — SODIUM CHLORIDE 0.9 % (FLUSH) 0.9 %
10 SYRINGE (ML) INJECTION EVERY 24 HOURS
Status: DISCONTINUED | OUTPATIENT
Start: 2017-10-28 | End: 2017-10-31 | Stop reason: HOSPADM

## 2017-10-28 RX ORDER — SODIUM CHLORIDE 0.9 % (FLUSH) 0.9 %
10 SYRINGE (ML) INJECTION AS NEEDED
Status: DISCONTINUED | OUTPATIENT
Start: 2017-10-28 | End: 2017-10-31 | Stop reason: HOSPADM

## 2017-10-28 RX ORDER — MIDODRINE HYDROCHLORIDE 5 MG/1
5 TABLET ORAL
Status: COMPLETED | OUTPATIENT
Start: 2017-10-28 | End: 2017-10-28

## 2017-10-28 RX ORDER — BACITRACIN 500 UNIT/G
1 PACKET (EA) TOPICAL AS NEEDED
Status: DISCONTINUED | OUTPATIENT
Start: 2017-10-28 | End: 2017-10-31 | Stop reason: HOSPADM

## 2017-10-28 RX ORDER — SODIUM CHLORIDE 9 MG/ML
250 INJECTION, SOLUTION INTRAVENOUS AS NEEDED
Status: DISCONTINUED | OUTPATIENT
Start: 2017-10-28 | End: 2017-10-31 | Stop reason: HOSPADM

## 2017-10-28 RX ORDER — SODIUM CHLORIDE 0.9 % (FLUSH) 0.9 %
20 SYRINGE (ML) INJECTION AS NEEDED
Status: DISCONTINUED | OUTPATIENT
Start: 2017-10-28 | End: 2017-10-31 | Stop reason: HOSPADM

## 2017-10-28 RX ADMIN — Medication 10 ML: at 21:28

## 2017-10-28 RX ADMIN — Medication 250 MCG: at 09:38

## 2017-10-28 RX ADMIN — SEVELAMER CARBONATE 1600 MG: 800 TABLET, FILM COATED ORAL at 17:28

## 2017-10-28 RX ADMIN — MORPHINE SULFATE 5 MG: 10 INJECTION, SOLUTION INTRAMUSCULAR; INTRAVENOUS at 11:49

## 2017-10-28 RX ADMIN — Medication 10 ML: at 05:06

## 2017-10-28 RX ADMIN — HYDROCODONE BITARTRATE AND ACETAMINOPHEN 1 TABLET: 5; 325 TABLET ORAL at 10:40

## 2017-10-28 RX ADMIN — MIDODRINE HYDROCHLORIDE 5 MG: 5 TABLET ORAL at 04:08

## 2017-10-28 RX ADMIN — LEVETIRACETAM 1000 MG: 500 TABLET, FILM COATED ORAL at 21:27

## 2017-10-28 RX ADMIN — SORBITOL 30 ML: 258.2 SOLUTION ORAL at 09:38

## 2017-10-28 RX ADMIN — SODIUM CHLORIDE, SODIUM LACTATE, CALCIUM CHLORIDE, MAGNESIUM CHLORIDE AND DEXTROSE 2500 ML: 1.5; 538; 448; 18.3; 5.08 INJECTION, SOLUTION INTRAPERITONEAL at 03:41

## 2017-10-28 RX ADMIN — SODIUM THIOSULFATE: 250 INJECTION, SOLUTION INTRAVENOUS at 13:34

## 2017-10-28 RX ADMIN — EPOETIN ALFA 8000 UNITS: 4000 SOLUTION INTRAVENOUS; SUBCUTANEOUS at 21:28

## 2017-10-28 RX ADMIN — HYDROCODONE BITARTRATE AND ACETAMINOPHEN 1 TABLET: 5; 325 TABLET ORAL at 21:27

## 2017-10-28 RX ADMIN — Medication 10 ML: at 09:39

## 2017-10-28 RX ADMIN — ONDANSETRON 4 MG: 2 INJECTION INTRAMUSCULAR; INTRAVENOUS at 12:18

## 2017-10-28 RX ADMIN — COLLAGENASE SANTYL: 250 OINTMENT TOPICAL at 13:28

## 2017-10-28 RX ADMIN — SODIUM CHLORIDE, SODIUM LACTATE, CALCIUM CHLORIDE, MAGNESIUM CHLORIDE AND DEXTROSE 2000 ML: 2.5; 538; 448; 18.3; 5.08 INJECTION, SOLUTION INTRAPERITONEAL at 19:51

## 2017-10-28 RX ADMIN — Medication 10 ML: at 21:29

## 2017-10-28 RX ADMIN — Medication 10 ML: at 14:00

## 2017-10-28 RX ADMIN — POTASSIUM CHLORIDE 40 MEQ: 750 TABLET, FILM COATED, EXTENDED RELEASE ORAL at 16:16

## 2017-10-28 RX ADMIN — HYDROCODONE BITARTRATE AND ACETAMINOPHEN 1 TABLET: 5; 325 TABLET ORAL at 02:25

## 2017-10-28 RX ADMIN — SEVELAMER CARBONATE 1600 MG: 800 TABLET, FILM COATED ORAL at 09:38

## 2017-10-28 RX ADMIN — Medication 10 ML: at 14:07

## 2017-10-28 RX ADMIN — SODIUM CHLORIDE 40 MG: 9 INJECTION INTRAMUSCULAR; INTRAVENOUS; SUBCUTANEOUS at 21:28

## 2017-10-28 RX ADMIN — SODIUM CHLORIDE 40 MG: 9 INJECTION INTRAMUSCULAR; INTRAVENOUS; SUBCUTANEOUS at 16:14

## 2017-10-28 RX ADMIN — HYDROCORTISONE 2.5%: 25 CREAM TOPICAL at 09:41

## 2017-10-28 RX ADMIN — SODIUM CHLORIDE 10 MG: 9 INJECTION INTRAMUSCULAR; INTRAVENOUS; SUBCUTANEOUS at 14:58

## 2017-10-28 RX ADMIN — Medication 10 ML: at 09:38

## 2017-10-28 NOTE — PROGRESS NOTES
Hospitalist Progress Note  Mitali Ren MD  Answering service: [phone] OR [phone] from in house phone      Date of Service:  10/28/2017  NAME:  Isidoro Tucker  :  1980  MRN:  [id number]      Admission Summary:   45 yo woman with ESRD on PD s/p failed renal transplant, calciphylaxis, h/o DVT and bilateral PE on apixaban, seizure disorder, and HTN presented to the ED from home on 10/27/17 with dark red rectal bleeding since day PTA. Patient takes naproxen daily then started ibuprofen recently for pain. Interval history / Subjective:   C/o 10/10 left hip pain at lesion site and abdominal pain; swelling of left leg     Assessment & Plan:     GI bleed (POA)  - GI following: plan for colonoscopy Mon 10/30; likely needs EGD too due to melena  - avoid NSAIDs  - hold apixaban    Acute blood loss anemia (POA)  - likely on anemia of CKD  - check anemia studies  - transfuse 2 units PRBC today due to persistent rectal bleeding and Hb 7    ESRD on PD   - follows at [facility]; Renal consulted here for PD orders  - may benefit from EPO    H/o DVT and bilateral PE,   - apixaban on hold for colonoscopy on Mon 10/30    Calciphylaxis  - sees [facility]; currently on TIW sodium thiosulfate infusions via right chest wall Brodie catheter  - resume qMWF sodium thiosulfate infusion    Right medial thigh and left hip wounds (POA)   - wound care consulted  - consult General Surgery for deep right thigh wond    Hypokalemia (POA) - replete judiciously prn    Seizure disorder - seizure precautions; continue AED    HTN - controlled on current meds    Morbid obesity, Body mass index is 47.03 kg/(m^2). Code status: Full  DVT prophylaxis: SCDs    Care Plan discussed with: Patient/Family, Nurse and Consultant General Surgery  Disposition: TBD.  Normally cared for at [facility] Problems  Date Reviewed: 10/27/2017          Codes Class Noted POA    * (Principal)Lower GI bleed ICD-10-CM: K92.2  ICD-9-CM: 578.9  10/27/2017 Yes        ESRD on peritoneal dialysis [facility]) ICD-10-CM: N18.6, Z99.2  ICD-9-CM: 585.6, V45.11  6/29/2011 Unknown            Review of Systems:   Pertinent items are noted in HPI. Vital Signs:    Last 24hrs VS reviewed since prior progress note.  Most recent are:  Visit Vitals    BP (!) 85/67 (BP 1 Location: Right arm, BP Patient Position: At rest)    Pulse 92    Temp 98.1 °F (36.7 °C)    Resp 17    Ht 5' 1\" (1.549 m)    Wt 112.9 kg (248 lb 14.4 oz)    SpO2 94%    BMI 47.03 kg/m2       Intake/Output Summary (Last 24 hours) at 10/28/17 2740  Last data filed at 10/28/17 0315   Gross per 24 hour   Intake             2500 ml   Output             1200 ml   Net             1300 ml      Physical Examination:     Constitutional:  awake, no acute distress, cooperative, pleasant, obesity   ENT:  oral mucosa moist, oropharynx benign  Neck supple, no masses   Resp:  CTA bilaterally, no wheezing/rhonchi/rales   CV:  regular rhythm, normal rate, no m/r/g appreciated, b/l LE edema, +pulses    GI:  +BS, soft, non distended, mildly tender, obese, +PD catheter    Musculoskeletal:  moves all extremities    Neurologic:  AAOx3, NFD     Skin:  left hip calciphylaxis lesion; deep right medial thigh wound  Eyes:  PERRL    Data Review:    Review and/or order of clinical lab test  Review and/or order of tests in the radiology section of CPT  Review and/or order of tests in the medicine section of CPT    Labs:     Recent Labs      10/28/17   0336  10/27/17   2106   WBC  5.9  7.2   HGB  7.0*  7.1*   HCT  22.4*  22.8*   PLT  325  268     Recent Labs      10/28/17   0336  10/27/17   1317   NA  137  137   K  3.1*  3.4*   CL  90*  91*   CO2  20*  21   BUN  57*  52*   CREA  13.10*  12.30*   GLU  73  88   CA  7.7*  8.2*     Recent Labs      10/27/17   1317   SGOT  33   ALT  48   AP  149*   TBILI  1.0   TP  7.9   ALB  2.3*   GLOB  5.6*     Recent Labs      10/27/17   1338   INR  1.2* PTP  11.9*   APTT  28.5      No results for input(s): FE, TIBC, PSAT, FERR in the last 72 hours. No results found for: FOL, RBCF   No results for input(s): PH, PCO2, PO2 in the last 72 hours. No results for input(s): CPK, CKNDX, TROIQ in the last 72 hours.     No lab exists for component: CPKMB  Lab Results   Component Value Date/Time    Cholesterol, total 190 06/29/2011 03:20 AM    HDL Cholesterol 65 06/29/2011 03:20 AM    LDL, calculated 111.6 06/29/2011 03:20 AM    Triglyceride 67 06/29/2011 03:20 AM    CHOL/HDL Ratio 2.9 06/29/2011 03:20 AM     Lab Results   Component Value Date/Time    Glucose (POC) 102 08/09/2017 01:38 AM    Glucose (POC) 74 11/18/2010 11:21 PM     Lab Results   Component Value Date/Time    Color YELLOW 08/11/2012 06:55 AM    Appearance CLOUDY 08/11/2012 06:55 AM    Specific gravity 1.025 08/11/2012 06:55 AM    pH (UA) 6.0 08/11/2012 06:55 AM    Protein 100 08/11/2012 06:55 AM    Glucose NEGATIVE  08/11/2012 06:55 AM    Ketone NEGATIVE  08/11/2012 06:55 AM    Urobilinogen 0.2 08/11/2012 06:55 AM    Nitrites NEGATIVE  08/11/2012 06:55 AM    Leukocyte Esterase TRACE 08/11/2012 06:55 AM    Epithelial cells 20-50 08/11/2012 06:55 AM    Bacteria 4+ 08/11/2012 06:55 AM    WBC 20-50 08/11/2012 06:55 AM    RBC 5-10 08/11/2012 06:55 AM     Medications Reviewed:     Current Facility-Administered Medications   Medication Dose Route Frequency    0.9% sodium chloride infusion 250 mL  250 mL IntraVENous PRN    cyanocobalamin (VITAMIN B12) tablet 250 mcg  250 mcg Oral DAILY    levETIRAcetam (KEPPRA) tablet 1,000 mg  1,000 mg Oral QHS    sevelamer carbonate (RENVELA) tab 1,600 mg  1,600 mg Oral TID    sodium chloride (NS) flush 5-10 mL  5-10 mL IntraVENous Q8H    sodium chloride (NS) flush 5-10 mL  5-10 mL IntraVENous PRN    ibuprofen (MOTRIN) tablet 400 mg  400 mg Oral Q4H PRN    HYDROcodone-acetaminophen (NORCO) 5-325 mg per tablet 1 Tab  1 Tab Oral Q4H PRN    morphine injection 5 mg  5 mg IntraVENous Q4H PRN    hydrocortisone (ANUSOL-HC) 2.5 % rectal cream   PeriANAL BID    midodrine (PROAMITINE) tablet 5 mg  5 mg Oral Q MON, WED & FRI    sorbitol 70 % solution 30 mL  30 mL Oral QID    [START ON 10/29/2017] PEG 3350-Electrolytes (GO-LYTELY) SUSPENSION 4,000 mL  4,000 mL Oral ONCE    peritoneal dialysis DEXTROSE 1.5% (2.5 mEq/L low calcium) solution 2,500 mL  2,500 mL IntraPERitoneal Q4H     ______________________________________________________________________  EXPECTED LENGTH OF STAY: - - -  ACTUAL LENGTH OF STAY:          [address], [place]

## 2017-10-28 NOTE — PROGRESS NOTES
Called by RN at 8.30 pm for PD orders   Chart reviewed and pt was seen by [facility] on all her prior admissions   Have informed RN to call [phone]

## 2017-10-28 NOTE — PROGRESS NOTES
Bedside shift change report given to [address] [place] [address] Drive (oncoming nurse) by Carley Galvan (offgoing nurse). Report included the following information SBAR, Kardex, Procedure Summary, Intake/Output, MAR and Recent Results. 2222: Paged on call Nephrologist Dr Chelsey Estevez for peritoneal dialysis orders. 2243: Spoke with Dr Chelsey Estevez. Orders received for dialysis four times daily.

## 2017-10-28 NOTE — PROGRESS NOTES
Problem: Falls - Risk of  Goal: *Absence of Falls  Document Catie Fall Risk and appropriate interventions in the flowsheet.    Outcome: Progressing Towards Goal  Fall Risk Interventions:            Medication Interventions: Evaluate medications/consider consulting pharmacy, Patient to call before getting OOB, Teach patient to arise slowly

## 2017-10-28 NOTE — PROGRESS NOTES
Gastroenterology Progress Note    10/28/2017    Admit Date: 10/27/2017    Subjective: Follow up for: GI bleeding(Jose Diazstacia)      Not happy for bring NPO. Hgb is lower today. Mother in the room.       Current Facility-Administered Medications   Medication Dose Route Frequency    0.9% sodium chloride infusion 250 mL  250 mL IntraVENous PRN    sodium chloride (NS) flush 20 mL  20 mL InterCATHeter PRN    sodium chloride (NS) flush 10 mL  10 mL InterCATHeter Q24H    sodium chloride (NS) flush 10 mL  10 mL InterCATHeter PRN    sodium chloride (NS) flush 10 mL  10 mL InterCATHeter Q8H    alteplase (CATHFLO) 1 mg in sterile water (preservative free) 1 mL injection  1 mg InterCATHeter PRN    bacitracin 500 unit/gram packet 1 Packet  1 Packet Topical PRN    ondansetron (ZOFRAN) injection 4 mg  4 mg IntraVENous Q4H PRN    collagenase (SANTYL) 250 unit/gram ointment   Topical DAILY    sodium thiosulfate 25 g in 100 mL infusion   IntraVENous ONCE    [START ON 10/30/2017] sodium thiosulfate 25 g in 100 mL infusion   IntraVENous DIALYSIS MON, WED & FRI    cyanocobalamin (VITAMIN B12) tablet 250 mcg  250 mcg Oral DAILY    levETIRAcetam (KEPPRA) tablet 1,000 mg  1,000 mg Oral QHS    sevelamer carbonate (RENVELA) tab 1,600 mg  1,600 mg Oral TID    sodium chloride (NS) flush 5-10 mL  5-10 mL IntraVENous Q8H    sodium chloride (NS) flush 5-10 mL  5-10 mL IntraVENous PRN    HYDROcodone-acetaminophen (NORCO) 5-325 mg per tablet 1 Tab  1 Tab Oral Q4H PRN    morphine injection 5 mg  5 mg IntraVENous Q4H PRN    hydrocortisone (ANUSOL-HC) 2.5 % rectal cream   PeriANAL BID    midodrine (PROAMITINE) tablet 5 mg  5 mg Oral Q MON, WED & FRI    sorbitol 70 % solution 30 mL  30 mL Oral QID    [START ON 10/29/2017] PEG 3350-Electrolytes (GO-LYTELY) SUSPENSION 4,000 mL  4,000 mL Oral ONCE    peritoneal dialysis DEXTROSE 1.5% (2.5 mEq/L low calcium) solution 2,500 mL  2,500 mL IntraPERitoneal Q4H Objective:     Blood pressure (!) 83/71, pulse 95, temperature 98.2 °F (36.8 °C), resp. rate 17, height 5' 1\" (1.549 m), weight 112.9 kg (248 lb 14.4 oz), last menstrual period 10/04/2017, SpO2 93 %.          10/26 1901 - 10/28 0700  In: 2500   Out: 1200         Physical Examination:       General:in restroom when I came to see her        Data Review    Recent Results (from the past 24 hour(s))   PROTHROMBIN TIME + INR    Collection Time: 10/27/17  1:38 PM   Result Value Ref Range    INR 1.2 (H) 0.9 - 1.1      Prothrombin time 11.9 (H) 9.0 - 11.1 sec   PTT    Collection Time: 10/27/17  1:38 PM   Result Value Ref Range    aPTT 28.5 22.1 - 32.5 sec    aPTT, therapeutic range     58.0 - 77.0 SECS   CBC W/O DIFF    Collection Time: 10/27/17  9:06 PM   Result Value Ref Range    WBC 7.2 3.6 - 11.0 K/uL    RBC 2.53 (L) 3.80 - 5.20 M/uL    HGB 7.1 (L) 11.5 - 16.0 g/dL    HCT 22.8 (L) 35.0 - 47.0 %    MCV 90.1 80.0 - 99.0 FL    MCH 28.1 26.0 - 34.0 PG    MCHC 31.1 30.0 - 36.5 g/dL    RDW 17.6 (H) 11.5 - 14.5 %    PLATELET 997 695 - 984 K/uL   CBC W/O DIFF    Collection Time: 10/28/17  3:36 AM   Result Value Ref Range    WBC 5.9 3.6 - 11.0 K/uL    RBC 2.50 (L) 3.80 - 5.20 M/uL    HGB 7.0 (L) 11.5 - 16.0 g/dL    HCT 22.4 (L) 35.0 - 47.0 %    MCV 89.6 80.0 - 99.0 FL    MCH 28.0 26.0 - 34.0 PG    MCHC 31.3 30.0 - 36.5 g/dL    RDW 17.7 (H) 11.5 - 14.5 %    PLATELET 867 095 - 651 K/uL   METABOLIC PANEL, BASIC    Collection Time: 10/28/17  3:36 AM   Result Value Ref Range    Sodium 137 136 - 145 mmol/L    Potassium 3.1 (L) 3.5 - 5.1 mmol/L    Chloride 90 (L) 97 - 108 mmol/L    CO2 20 (L) 21 - 32 mmol/L    Anion gap 27 (H) 5 - 15 mmol/L    Glucose 73 65 - 100 mg/dL    BUN 57 (H) 6 - 20 MG/DL    Creatinine 13.10 (H) 0.55 - 1.02 MG/DL    BUN/Creatinine ratio 4 (L) 12 - 20      GFR est AA 4 (L) >60 ml/min/1.73m2    GFR est non-AA 3 (L) >60 ml/min/1.73m2    Calcium 7.7 (L) 8.5 - 10.1 MG/DL   FOLATE    Collection Time: 10/28/17  3:36 AM   Result Value Ref Range    Folate 4.2 (L) 5.0 - 21.0 ng/mL   VITAMIN B12    Collection Time: 10/28/17  3:36 AM   Result Value Ref Range    Vitamin B12 482 211 - 911 pg/mL   FERRITIN    Collection Time: 10/28/17  3:36 AM   Result Value Ref Range    Ferritin 764 (H) 8 - 252 NG/ML   IRON PROFILE    Collection Time: 10/28/17  3:36 AM   Result Value Ref Range    Iron 63 35 - 150 ug/dL    TIBC 129 (L) 250 - 450 ug/dL    Iron % saturation 49 20 - 50 %   TYPE + CROSSMATCH    Collection Time: 10/28/17  9:49 AM   Result Value Ref Range    Crossmatch Expiration 10/31/2017     ABO/Rh(D) A POSITIVE     Antibody screen POS     Comment Previously identified Anti JkA at [facility]     ANTIGEN TYPING Jk(A) NEGATIVE,     Antibody ID [id number](A)     Unit number [id number]     Blood component type RC LR AS1     Unit division 00     Status of unit ISSUED     ANTIGEN/ANTIBODY INFO Jk(A) NEGATIVE,     Crossmatch result Compatible    CBC W/O DIFF    Collection Time: 10/28/17  9:49 AM   Result Value Ref Range    WBC 6.0 3.6 - 11.0 K/uL    RBC 2.50 (L) 3.80 - 5.20 M/uL    HGB 6.9 (L) 11.5 - 16.0 g/dL    HCT 22.5 (L) 35.0 - 47.0 %    MCV 90.0 80.0 - 99.0 FL    MCH 27.6 26.0 - 34.0 PG    MCHC 30.7 30.0 - 36.5 g/dL    RDW 17.8 (H) 11.5 - 14.5 %    PLATELET 148 155 - 045 K/uL     Recent Labs      10/28/17   0949  10/28/17   0336   WBC  6.0  5.9   HGB  6.9*  7.0*   HCT  22.5*  22.4*   PLT  310  325     Recent Labs      10/28/17   0336  10/27/17   1317   NA  137  137   K  3.1*  3.4*   CL  90*  91*   CO2  20*  21   BUN  57*  52*   CREA  13.10*  12.30*   GLU  73  88   CA  7.7*  8.2*     Recent Labs      10/27/17   1317   SGOT  33   AP  149*   TP  7.9   ALB  2.3*   GLOB  5.6*     Recent Labs      10/27/17   1338   INR  1.2*   PTP  11.9*   APTT  28.5      Recent Labs      10/28/17   0336   TIBC  129*   PSAT  49   FERR  764*      Lab Results   Component Value Date/Time    Folate 4.2 10/28/2017 03:36 AM      No results for input(s): PH, PCO2, PO2 in the last 72 hours. No results for input(s): CPK, CKNDX, TROIQ in the last 72 hours. No lab exists for component: CPKMB  Lab Results   Component Value Date/Time    Cholesterol, total 190 06/29/2011 03:20 AM    HDL Cholesterol 65 06/29/2011 03:20 AM    LDL, calculated 111.6 06/29/2011 03:20 AM    Triglyceride 67 06/29/2011 03:20 AM    CHOL/HDL Ratio 2.9 06/29/2011 03:20 AM     No components found for: [place]  Lab Results   Component Value Date/Time    Color YELLOW 08/11/2012 06:55 AM    Appearance CLOUDY 08/11/2012 06:55 AM    Specific gravity 1.025 08/11/2012 06:55 AM    pH (UA) 6.0 08/11/2012 06:55 AM    Protein 100 08/11/2012 06:55 AM    Glucose NEGATIVE  08/11/2012 06:55 AM    Ketone NEGATIVE  08/11/2012 06:55 AM    Urobilinogen 0.2 08/11/2012 06:55 AM    Nitrites NEGATIVE  08/11/2012 06:55 AM    Leukocyte Esterase TRACE 08/11/2012 06:55 AM    Epithelial cells 20-50 08/11/2012 06:55 AM    Bacteria 4+ 08/11/2012 06:55 AM    WBC 20-50 08/11/2012 06:55 AM    RBC 5-10 08/11/2012 06:55 AM        ROS: -CP, SOB, Dysuria, palpitations, cough. Assessment:    Principal Problem:    Lower GI bleed (10/27/2017)    Active Problems:    ESRD on peritoneal dialysis ([facility] [address] (6/29/2011)             Plan/Discussion:     1. Anemia is worse and she was not hypotensive on arrival. Her hgb has dropped. 2. She needs her hgb optimized and BP in a safe rage first before any endoscopic intervention is planned over the weekend(Versed/Fentanyl will make BP drop further). 3. Start a CLD. 4. If with blood transfusions her BP does not get better(Why is she on midodrine? Baseline hypotension?) she may need an EGD over the weekend with anesthesia help. 5.D/w mother, and RN at bedside in detail. 6. Start IV PPI bid. Signed By: Yovani Rao.  Dayton Mortimer, MD    10/28/2017  1:35 PM

## 2017-10-28 NOTE — PROGRESS NOTES
Bedside shift change report given to [address] [place] [address] Drive (oncoming nurse) by No Salmeron (offgoing nurse). Report included the following information SBAR, Kardex, Procedure Summary, Intake/Output, MAR and Recent Results.

## 2017-10-28 NOTE — CONSULTS
Surgery Consult    Subjective:      Beck Serrano is a 44 y.o. female who we are being consulted to right leg wound. This has been present she believes for a few days. She had another wound on the Left side that has been present for about 2 months. She believes the one on the left is getting better. When she was at [facility] 2 days ago she was told that her right leg needed to be packed but then it did not happen. She was set up for wound care for next week. She denies any fever. She came to the hospital for rectal bleeding  She has a history of ESRD, DVT, PE and is on Eliquis. She has also been taking motrin around the the clock as well. Past Medical History:   Diagnosis Date    Chronic kidney disease     [facility] [address] [facility]     Seizures ([facility] [address]      Past Surgical History:   Procedure Laterality Date    BIOPSY      parathyroid    DELIVERY       HX  SECTION      HX CHOLECYSTECTOMY      HX OTHER SURGICAL      fistula and PD port in stomach     HX TRANSPLANT  [facility]      History reviewed. No pertinent family history.   Social History     Social History    Marital status: SINGLE     Spouse name: N/A    Number of children: N/A    Years of education: N/A     Social History Main Topics    Smoking status: Never Smoker    Smokeless tobacco: Never Used    Alcohol use No    Drug use: No    Sexual activity: Not Asked     Other Topics Concern    None     Social History Narrative      Current Facility-Administered Medications   Medication Dose Route Frequency Provider Last Rate Last Dose    0.9% sodium chloride infusion 250 mL  250 mL IntraVENous PRN Torito Tracy MD        sodium chloride (NS) flush 20 mL  20 mL InterCATHeter PRCOURTNEY Tracy MD        sodium chloride (NS) flush 10 mL  10 mL InterCATHeter Q24H Torito Tracy MD   10 mL at 10/28/17 0939    sodium chloride (NS) flush 10 mL  10 mL InterCATHeter PRCOURTNEY Tracy MD        sodium chloride (NS) flush 10 mL  10 mL InterCATHeter Q8H Yesenia Michaud MD   10 mL at 10/28/17 0938    alteplase (CATHFLO) 1 mg in sterile water (preservative free) 1 mL injection  1 mg InterCATHeter PRN Yesenia Michaud MD        bacitracin 500 unit/gram packet 1 Packet  1 Packet Topical PRN Yesenia Michaud MD        ondansetron (ZOFRAN) injection 4 mg  4 mg IntraVENous Q4H PRN Yesenia Michaud MD        sodium thiosulfate 25 g in 0.9% sodium chloride 100 mL infusion   IntraVENous ONCE Yesenia Michaud MD        [START ON 10/30/2017] sodium thiosulfate 25 g in 0.9% sodium chloride 100 mL infusion   IntraVENous Q MON, WED & FRI Yesenia Michaud MD        collagenase (SANTYL) 250 unit/gram ointment   Topical DAILY Juancho MD Harriet        cyanocobalamin (VITAMIN B12) tablet 250 mcg  250 mcg Oral DAILY Georgena Rubinstein, MD   250 mcg at 10/28/17 8366    levETIRAcetam (KEPPRA) tablet 1,000 mg  1,000 mg Oral QHS Georgena Rubinstein, MD   1,000 mg at 10/27/17 2302    sevelamer carbonate (RENVELA) tab 1,600 mg  1,600 mg Oral TID Georgena Rubinstein, MD   1,600 mg at 10/28/17 7592    sodium chloride (NS) flush 5-10 mL  5-10 mL IntraVENous Q8H Georgena Rubinstein, MD   10 mL at 10/28/17 0506    sodium chloride (NS) flush 5-10 mL  5-10 mL IntraVENous PRN Georgena Rubinstein, MD        ibuprofen (MOTRIN) tablet 400 mg  400 mg Oral Q4H PRN Georgena Rubinstein, MD   400 mg at 10/27/17 1852    HYDROcodone-acetaminophen (NORCO) 5-325 mg per tablet 1 Tab  1 Tab Oral Q4H PRN Georgena Rubinstein, MD   1 Tab at 10/28/17 1040    morphine injection 5 mg  5 mg IntraVENous Q4H PRN Georgena Rubinstein, MD        hydrocortisone (ANUSOL-HC) 2.5 % rectal cream   PeriANAL BID Farhan Medrano NP        midodrine (PROAMITINE) tablet 5 mg  5 mg Oral Q MON, WED & Jorge Ernandez MD        sorbitol 70 % solution 30 mL  30 mL Oral QID Mary Ann Mckeon MD   30 mL at 10/28/17 0938    [START ON 10/29/2017] PEG 3350-Electrolytes (GO-LYTELY) SUSPENSION 4,000 mL  4,000 mL Oral ONCE Mary Ann Mckeon MD  peritoneal dialysis DEXTROSE 1.5% (2.5 mEq/L low calcium) solution 2,500 mL  2,500 mL IntraPERitoneal Q4H Minnie Graves III, MD   2,500 mL at 10/28/17 0341        Allergies   Allergen Reactions    Phoslo [Calcium Acetate] Angioedema       Review of Systems:  Constitutional: negative  Eyes: negative  Ears, Nose, Mouth, Throat, and Face: negative  Respiratory: negative  Cardiovascular: negative  Gastrointestinal: rectal bleeding. Genitourinary:negative  Integument/Breast: negative  Hematologic/Lymphatic: negative  Musculoskeletal:leg and hip wounds  Neurological: negative  Behavioral/Psychiatric: negative  Endocrine: negative  Allergic/Immunologic: negative    Objective:      Patient Vitals for the past 8 hrs:   BP Temp Pulse Resp SpO2 Weight   10/28/17 0944 (!) 83/71 98.2 °F (36.8 °C) 95 17 93 % -   10/28/17 0421 - - - - - 248 lb 14.4 oz (112.9 kg)       Temp (24hrs), Av °F (36.7 °C), Min:97.5 °F (36.4 °C), Max:98.3 °F (36.8 °C)      Physical Exam:  GENERAL: alert, cooperative, no distress, appears stated age, EYE: negative, LUNG: clear to auscultation bilaterally, HEART: regular rate and rhythm, ABDOMEN: soft, non-tender.  Bowel sounds normal. No masses,  no organomegaly, EXTREMITIES- left hip- wound healing  Right lower inner thigh- this is some necrotic tissue in the base of the wound  , NEUROLOGIC: negative, PSYCHIATRIC: non focal    Recent Results (from the past 24 hour(s))   OCCULT BLOOD, STOOL    Collection Time: 10/27/17  1:17 PM   Result Value Ref Range    Occult blood, stool POSITIVE (A) NEG     CBC WITH AUTOMATED DIFF    Collection Time: 10/27/17  1:17 PM   Result Value Ref Range    WBC 7.5 3.6 - 11.0 K/uL    RBC 2.68 (L) 3.80 - 5.20 M/uL    HGB 7.6 (L) 11.5 - 16.0 g/dL    HCT 24.0 (L) 35.0 - 47.0 %    MCV 89.6 80.0 - 99.0 FL    MCH 28.4 26.0 - 34.0 PG    MCHC 31.7 30.0 - 36.5 g/dL    RDW 17.8 (H) 11.5 - 14.5 %    PLATELET 687 126 - 744 K/uL    NEUTROPHILS 85 (H) 32 - 75 %    LYMPHOCYTES 9 (L) 12 - 49 %    MONOCYTES 5 5 - 13 %    EOSINOPHILS 1 0 - 7 %    BASOPHILS 0 0 - 1 %    ABS. NEUTROPHILS 6.3 1.8 - 8.0 K/UL    ABS. LYMPHOCYTES 0.7 (L) 0.8 - 3.5 K/UL    ABS. MONOCYTES 0.4 0.0 - 1.0 K/UL    ABS. EOSINOPHILS 0.1 0.0 - 0.4 K/UL    ABS. BASOPHILS 0.0 0.0 - 0.1 K/UL    DF SMEAR SCANNED      RBC COMMENTS ANISOCYTOSIS  1+        RBC COMMENTS POLYCHROMASIA  PRESENT       METABOLIC PANEL, COMPREHENSIVE    Collection Time: 10/27/17  1:17 PM   Result Value Ref Range    Sodium 137 136 - 145 mmol/L    Potassium 3.4 (L) 3.5 - 5.1 mmol/L    Chloride 91 (L) 97 - 108 mmol/L    CO2 21 21 - 32 mmol/L    Anion gap 25 (H) 5 - 15 mmol/L    Glucose 88 65 - 100 mg/dL    BUN 52 (H) 6 - 20 MG/DL    Creatinine 12.30 (H) 0.55 - 1.02 MG/DL    BUN/Creatinine ratio 4 (L) 12 - 20      GFR est AA 4 (L) >60 ml/min/1.73m2    GFR est non-AA 3 (L) >60 ml/min/1.73m2    Calcium 8.2 (L) 8.5 - 10.1 MG/DL    Bilirubin, total 1.0 0.2 - 1.0 MG/DL    ALT (SGPT) 48 12 - 78 U/L    AST (SGOT) 33 15 - 37 U/L    Alk.  phosphatase 149 (H) 45 - 117 U/L    Protein, total 7.9 6.4 - 8.2 g/dL    Albumin 2.3 (L) 3.5 - 5.0 g/dL    Globulin 5.6 (H) 2.0 - 4.0 g/dL    A-G Ratio 0.4 (L) 1.1 - 2.2     PROTHROMBIN TIME + INR    Collection Time: 10/27/17  1:38 PM   Result Value Ref Range    INR 1.2 (H) 0.9 - 1.1      Prothrombin time 11.9 (H) 9.0 - 11.1 sec   PTT    Collection Time: 10/27/17  1:38 PM   Result Value Ref Range    aPTT 28.5 22.1 - 32.5 sec    aPTT, therapeutic range     58.0 - 77.0 SECS   CBC W/O DIFF    Collection Time: 10/27/17  9:06 PM   Result Value Ref Range    WBC 7.2 3.6 - 11.0 K/uL    RBC 2.53 (L) 3.80 - 5.20 M/uL    HGB 7.1 (L) 11.5 - 16.0 g/dL    HCT 22.8 (L) 35.0 - 47.0 %    MCV 90.1 80.0 - 99.0 FL    MCH 28.1 26.0 - 34.0 PG    MCHC 31.1 30.0 - 36.5 g/dL    RDW 17.6 (H) 11.5 - 14.5 %    PLATELET 041 700 - 622 K/uL   CBC W/O DIFF    Collection Time: 10/28/17  3:36 AM   Result Value Ref Range    WBC 5.9 3.6 - 11.0 K/uL    RBC 2.50 (L) 3.80 - 5.20 M/uL    HGB 7.0 (L) 11.5 - 16.0 g/dL    HCT 22.4 (L) 35.0 - 47.0 %    MCV 89.6 80.0 - 99.0 FL    MCH 28.0 26.0 - 34.0 PG    MCHC 31.3 30.0 - 36.5 g/dL    RDW 17.7 (H) 11.5 - 14.5 %    PLATELET 245 018 - 292 K/uL   METABOLIC PANEL, BASIC    Collection Time: 10/28/17  3:36 AM   Result Value Ref Range    Sodium 137 136 - 145 mmol/L    Potassium 3.1 (L) 3.5 - 5.1 mmol/L    Chloride 90 (L) 97 - 108 mmol/L    CO2 20 (L) 21 - 32 mmol/L    Anion gap 27 (H) 5 - 15 mmol/L    Glucose 73 65 - 100 mg/dL    BUN 57 (H) 6 - 20 MG/DL    Creatinine 13.10 (H) 0.55 - 1.02 MG/DL    BUN/Creatinine ratio 4 (L) 12 - 20      GFR est AA 4 (L) >60 ml/min/1.73m2    GFR est non-AA 3 (L) >60 ml/min/1.73m2    Calcium 7.7 (L) 8.5 - 10.1 MG/DL   FOLATE    Collection Time: 10/28/17  3:36 AM   Result Value Ref Range    Folate 4.2 (L) 5.0 - 21.0 ng/mL   VITAMIN B12    Collection Time: 10/28/17  3:36 AM   Result Value Ref Range    Vitamin B12 482 211 - 911 pg/mL   FERRITIN    Collection Time: 10/28/17  3:36 AM   Result Value Ref Range    Ferritin 764 (H) 8 - 252 NG/ML   IRON PROFILE    Collection Time: 10/28/17  3:36 AM   Result Value Ref Range    Iron 63 35 - 150 ug/dL    TIBC 129 (L) 250 - 450 ug/dL    Iron % saturation 49 20 - 50 %   TYPE + CROSSMATCH    Collection Time: 10/28/17  9:49 AM   Result Value Ref Range    Crossmatch Expiration 10/31/2017     ABO/Rh(D) PENDING     Antibody screen PENDING     Comment Previously identified Anti JkA at [facility]    CBC W/O DIFF    Collection Time: 10/28/17  9:49 AM   Result Value Ref Range    WBC 6.0 3.6 - 11.0 K/uL    RBC 2.50 (L) 3.80 - 5.20 M/uL    HGB 6.9 (L) 11.5 - 16.0 g/dL    HCT 22.5 (L) 35.0 - 47.0 %    MCV 90.0 80.0 - 99.0 FL    MCH 27.6 26.0 - 34.0 PG    MCHC 30.7 30.0 - 36.5 g/dL    RDW 17.8 (H) 11.5 - 14.5 %    PLATELET 298 448 - 386 K/uL         Assessment:     Hospital Problems  Date Reviewed: 10/27/2017          Codes Class Noted POA    * (Principal)Lower GI bleed ICD-10-CM: K92.2  ICD-9-CM: 578.9  10/27/2017 Yes        ESRD on peritoneal dialysis [facility]) ICD-10-CM: N18.6, Z99.2  ICD-9-CM: 585.6, V45.11  6/29/2011 Unknown              Plan:   Left hip- does not require any treatment  Right thigh- will begin using santyl and packing daily-If its does not clean up appropriately may need operative intervention.      Signed By: Henrique Diaz MD     October 28, 2017

## 2017-10-28 NOTE — CONSULTS
Consultation Note    NAME: Viky Bound   :  1980   MRN:  [id number]     Date/Time:  10/28/2017 3:12 PM    I have been asked to see this patient by Dr. Renee Killian  for advice/opinion re: ESRD. Assessment :    Plan:  [facility]  Anemia  GI bleed  Hypokalemia Will start 2.5% 2liter exchanges 4X/day. Replete K po. Start EPO. Subjective:   CHIEF COMPLAINT:  \"I'm bleeding from my rectum. \"    HISTORY OF PRESENT ILLNESS:     Camilla Fernandez is a 44 y.o.   female who has a history of ESRD admitted with a GI bleed. She says that she has been doing well with her PD at home. She says that she noticed BRBPR and came to the ER. She says that her BP runs on the low side. No N/V. Hungry at present. No dyspnea and no swelling. Past Medical History:   Diagnosis Date    Chronic kidney disease     Dialysis patient ([facility] [address]     Seizures ([facility] [address]       Past Surgical History:   Procedure Laterality Date    BIOPSY      parathyroid    DELIVERY       HX  SECTION      HX CHOLECYSTECTOMY      HX OTHER SURGICAL      fistula and PD port in stomach     HX TRANSPLANT  [facility]     Social History   Substance Use Topics    Smoking status: Never Smoker    Smokeless tobacco: Never Used    Alcohol use No      History reviewed. No pertinent family history. Allergies   Allergen Reactions    Phoslo [Calcium Acetate] Angioedema      Prior to Admission medications    Medication Sig Start Date End Date Taking? Authorizing Provider   apixaban (ELIQUIS) 2.5 mg tablet Take 2.5 mg by mouth two (2) times a day. Yes Nitish Christopher MD   ondansetron (ZOFRAN ODT) 4 mg disintegrating tablet Take 4 mg by mouth every eight (8) hours as needed for Nausea. Yes Nitish Christopher MD   clindamycin (CLEOCIN) 300 mg capsule Take 300 mg by mouth four (4) times daily.  Started 10/26, 14 day course, for infected calciphylaxis wounds 10/26/17 11/3/17 Yes Nitish Christopher MD   sodium thiosulfate infusion 25 mg by IntraVENous route DIALYSIS MON, WED & FRI. Patient is on PD only, gets this at [address] [place] [address] MD Candi   midodrine (PROAMITINE) 5 mg tablet Take 5 mg by mouth every Monday, Wednesday, Friday. Given with IV sodium thiosulfate   Indications: Symptomatic Orthostatic Hypotension   Yes Historical Provider   peritoneal dialysis with additives by IntraPERitoneal route two (2) times a day. Yes Historical Provider   diazePAM (VALIUM) 5 mg tablet Take 5 mg by mouth every twelve (12) hours as needed for Anxiety. Yes Historical Provider   calcium carbonate (TUMS) 200 mg calcium (500 mg) chew Take 1 Tab by mouth four (4) times daily. 5/9/17  Yes Sena De Los Santos MD   levETIRAcetam (KEPPRA) 1,000 mg tablet Take 1,000 mg by mouth nightly. Yes Historical Provider   ergocalciferol (VITAMIN D2) 50,000 unit capsule Take 50,000 Units by mouth daily. Yes Historical Provider   cyanocobalamin (VITAMIN B-12) 500 mcg tablet Take 250 mcg by mouth daily. Yes Historical Provider   sevelamer carbonate (RENVELA) 800 mg tab tab Take 1,600 mg by mouth three (3) times daily. Yes Historical Provider   potassium chloride (K-DUR, KLOR-CON) 20 mEq tablet Take 20 mEq by mouth daily. Yes Historical Provider   famotidine (PEPCID AC) 20 mg tablet Take 20 mg by mouth daily as needed for Other (indigestion).    Yes Historical Provider     REVIEW OF SYSTEMS:     []  Unable to obtain reliable ROS due to  [] mental status  [] sedated   [] intubated   [x] Total of 12 systems reviewed as follows:  Constitutional: negative fever, negative chills, negative weight loss  Eyes:   negative visual changes  ENT:   negative sore throat, tongue or lip swelling  Respiratory:  negative cough, negative dyspnea  Cards:  negative for chest pain, palpitations, lower extremity edema  GI:   negative for nausea, vomiting, diarrhea, and abdominal pain; +BRBPR  :  negative for frequency, dysuria  Integument:  negative for rash and pruritus  Heme:  negative for easy bruising and gum/nose bleeding  Musculoskel: negative for myalgias,  back pain and muscle weakness  Neuro:  negative for headaches, dizziness, vertigo  Psych:  negative for feelings of anxiety, depression   Travel?: none    Objective:   VITALS:    Visit Vitals    BP 95/77 (BP 1 Location: Right arm, BP Patient Position: At rest)    Pulse (!) 106    Temp 97.6 °F (36.4 °C)    Resp 18    Ht 5' 1\" (1.549 m)    Wt 112.9 kg (248 lb 14.4 oz)    SpO2 97%    BMI 47.03 kg/m2     PHYSICAL EXAM:  Gen:  []  WD []  WN  [] cachectic []  thin [x]  obese []  disheveled             []  ill apearing  []   Critical  []   Chronic    [x]  No acute distress    HEENT:   [x] NC/AT/PERRL    [x] pink conjunctivae      [] pale conjunctivae                  PERRL  [] yes  [] no      [] moist mucosa    [] dry mucosa    hearing intact to voice [] yes  [] No                 NECK:   supple [x] yes  [] no        masses [] yes  [x] No               thyroid  []  non tender  []  tender    RESP:   [x] CTA bilaterally/no wheezing/rhonchi/rales/crackles    [] rhonchi bilaterally - no dullness  [] wheezing   [] rhonchi   [] crackles     use of accessory muscles [] yes [] no    CARD:   [x]  regular rate and rhythm/No murmurs/rubs/gallops    murmur  [] yes ()  [] no      Rubs  [] yes  [] no       Gallops [] yes  [] no    Rate []  regular  []  irregular        carotid bruits  [] Right  []  Left                 LE edema [] yes  [x] no           JVP  []  yes   []  no    ABD:    [x] soft/non distended/non tender/+bowel sounds/no HSM/PD cath in place.     []  Rigid    tenderness [] yes [] no   Liver enlargement  []  yes []  no                Spleen enlargement  []  yes []  no     distended []  yes [] no     bowel sound  [] hypoactive   [] hyperactive    LYMPH:    Neck []  yes [x]  no       Axillae []  yes [x]  no    SKIN:   Rashes []  yes   [x]  no    Ulcers []  yes   [x]  no               [] tight to palpitation    skin turgor []  good  [] poor  [] decreased               Cyanosis/clubbing []  yes []  no    NEUR:   [x] cranial nerves II-XII grossly intact       [] Cranial nerves deficit                 []  facial droop    []  slurred speech   [] aphasic     [] Strength normal     []  weakness  []  LUE  []   RUE/ []  LLE  []   RLE    follows commands  [x]  yes []  no           PSYCH:   insight [] poor [x] good   Alert and Oriented to  [x] person  [x] place  [x]  time                    [] depressed [] anxious [] agitated  [] lethargic [] stuporous  [] sedated     LAB DATA REVIEWED:    Recent Labs      10/28/17   0949  10/28/17   0336   WBC  6.0  5.9   HGB  6.9*  7.0*   HCT  22.5*  22.4*   PLT  310  325     Recent Labs      10/28/17   0336  10/27/17   1317   NA  137  137   K  3.1*  3.4*   CL  90*  91*   CO2  20*  21   BUN  57*  52*   CREA  13.10*  12.30*   GLU  73  88   CA  7.7*  8.2*     Recent Labs      10/27/17   1317   SGOT  33   ALT  48   AP  149*   TBILI  1.0   ALB  2.3*   GLOB  5.6*     Recent Labs      10/27/17   1338   INR  1.2*   PTP  11.9*   APTT  28.5      Recent Labs      10/28/17   0336   TIBC  129*   PSAT  49   FERR  764*      No results for input(s): PH, PCO2, PO2 in the last 72 hours. No results for input(s): CPK, CKMB in the last 72 hours.     No lab exists for component: TROPONINI  Lab Results   Component Value Date/Time    Glucose (POC) 102 08/09/2017 01:38 AM    Glucose (POC) 74 11/18/2010 11:21 PM       Procedures: see electronic medical records for all procedures/Xrays and details which were not copied into this note but were reviewed prior to creation of Plan.    ________________________________________________________________________       ___________________________________________________  Consulting Physician: Geneva Russo, MD

## 2017-10-28 NOTE — PROGRESS NOTES
Vascular access line evaluated right upper chest. Patient has a Power line right upper chest which is a double lumen central line. The line is functioning properly, caps changed and bactericidal caps placed. A chest xray will done to confirm tip placement Shabbir Flores RN notified. Protocol central line orders recieved and placed open chart. Documented in doc flow sheet.

## 2017-10-28 NOTE — PROGRESS NOTES
Spiritual Care Partner Volunteer visited patient in room 612 on 10.28.17. Documented by: : Rev. Brandon Blank.  Sydni Corporal; [facility], to contact [phone] call: [phone]

## 2017-10-28 NOTE — PROGRESS NOTES
Dr Hannah Flowers assessed the patient and wanted me to verify what the central line was. Paged the doctor on call for Dr Jaden Baron nephrology at [facility]. Received a return page by Dr. Ruthie Dykes who confirmed that the central line was a Brodie catheter midline. Will fix this in the [facility] flowsheets. Will continue to follow. 1430: Paged Dr. Kurtis Murillo about still not having orders for PD dialysis for the patient. He stated he had an emergency at [facility] and that hre potassium is normal and that she does not need her PD right now. She has now missed her 0800 dose and her 1200 dose. He stated he would put the orders in when he gets a chance. Will continue to follow.

## 2017-10-29 LAB
ALBUMIN SERPL-MCNC: 1.9 G/DL (ref 3.5–5)
ALBUMIN/GLOB SERPL: 0.4 {RATIO} (ref 1.1–2.2)
ALP SERPL-CCNC: 124 U/L (ref 45–117)
ALT SERPL-CCNC: 34 U/L (ref 12–78)
ANION GAP SERPL CALC-SCNC: 31 MMOL/L (ref 5–15)
AST SERPL-CCNC: 21 U/L (ref 15–37)
BASOPHILS # BLD: 0 K/UL (ref 0–0.1)
BASOPHILS NFR BLD: 0 % (ref 0–1)
BILIRUB SERPL-MCNC: 0.6 MG/DL (ref 0.2–1)
BUN SERPL-MCNC: 55 MG/DL (ref 6–20)
BUN/CREAT SERPL: 4 (ref 12–20)
CALCIUM SERPL-MCNC: 7.9 MG/DL (ref 8.5–10.1)
CHLORIDE SERPL-SCNC: 89 MMOL/L (ref 97–108)
CO2 SERPL-SCNC: 19 MMOL/L (ref 21–32)
CREAT SERPL-MCNC: 13.2 MG/DL (ref 0.55–1.02)
EOSINOPHIL # BLD: 0 K/UL (ref 0–0.4)
EOSINOPHIL NFR BLD: 1 % (ref 0–7)
ERYTHROCYTE [DISTWIDTH] IN BLOOD BY AUTOMATED COUNT: 18.5 % (ref 11.5–14.5)
GLOBULIN SER CALC-MCNC: 5.4 G/DL (ref 2–4)
GLUCOSE SERPL-MCNC: 87 MG/DL (ref 65–100)
HCT VFR BLD AUTO: 27.1 % (ref 35–47)
HGB BLD-MCNC: 8.8 G/DL (ref 11.5–16)
LYMPHOCYTES # BLD: 0.9 K/UL (ref 0.8–3.5)
LYMPHOCYTES NFR BLD: 13 % (ref 12–49)
MAGNESIUM SERPL-MCNC: 1.6 MG/DL (ref 1.6–2.4)
MCH RBC QN AUTO: 28 PG (ref 26–34)
MCHC RBC AUTO-ENTMCNC: 32.5 G/DL (ref 30–36.5)
MCV RBC AUTO: 86.3 FL (ref 80–99)
MONOCYTES # BLD: 0.4 K/UL (ref 0–1)
MONOCYTES NFR BLD: 6 % (ref 5–13)
NEUTS SEG # BLD: 5.8 K/UL (ref 1.8–8)
NEUTS SEG NFR BLD: 80 % (ref 32–75)
PHOSPHATE SERPL-MCNC: 7.7 MG/DL (ref 2.6–4.7)
PLATELET # BLD AUTO: 315 K/UL (ref 150–400)
POTASSIUM SERPL-SCNC: 3.4 MMOL/L (ref 3.5–5.1)
PROT SERPL-MCNC: 7.3 G/DL (ref 6.4–8.2)
RBC # BLD AUTO: 3.14 M/UL (ref 3.8–5.2)
SODIUM SERPL-SCNC: 139 MMOL/L (ref 136–145)
WBC # BLD AUTO: 7.2 K/UL (ref 3.6–11)

## 2017-10-29 PROCEDURE — 74011000250 HC RX REV CODE- 250: Performed by: INTERNAL MEDICINE

## 2017-10-29 PROCEDURE — 83735 ASSAY OF MAGNESIUM: CPT | Performed by: INTERNAL MEDICINE

## 2017-10-29 PROCEDURE — 74011250637 HC RX REV CODE- 250/637: Performed by: INTERNAL MEDICINE

## 2017-10-29 PROCEDURE — 85025 COMPLETE CBC W/AUTO DIFF WBC: CPT | Performed by: INTERNAL MEDICINE

## 2017-10-29 PROCEDURE — 36415 COLL VENOUS BLD VENIPUNCTURE: CPT | Performed by: INTERNAL MEDICINE

## 2017-10-29 PROCEDURE — 90945 DIALYSIS ONE EVALUATION: CPT

## 2017-10-29 PROCEDURE — 84100 ASSAY OF PHOSPHORUS: CPT | Performed by: INTERNAL MEDICINE

## 2017-10-29 PROCEDURE — 80053 COMPREHEN METABOLIC PANEL: CPT | Performed by: INTERNAL MEDICINE

## 2017-10-29 PROCEDURE — 74011250636 HC RX REV CODE- 250/636: Performed by: INTERNAL MEDICINE

## 2017-10-29 PROCEDURE — A4722 DIALYS SOL FLD VOL > 1999CC: HCPCS | Performed by: INTERNAL MEDICINE

## 2017-10-29 PROCEDURE — C9113 INJ PANTOPRAZOLE SODIUM, VIA: HCPCS | Performed by: INTERNAL MEDICINE

## 2017-10-29 PROCEDURE — 74011000250 HC RX REV CODE- 250: Performed by: SPECIALIST

## 2017-10-29 PROCEDURE — 65270000029 HC RM PRIVATE

## 2017-10-29 RX ORDER — LANOLIN ALCOHOL/MO/W.PET/CERES
500 CREAM (GRAM) TOPICAL DAILY
Status: DISCONTINUED | OUTPATIENT
Start: 2017-10-30 | End: 2017-10-31 | Stop reason: HOSPADM

## 2017-10-29 RX ORDER — POTASSIUM CHLORIDE 750 MG/1
40 TABLET, FILM COATED, EXTENDED RELEASE ORAL
Status: COMPLETED | OUTPATIENT
Start: 2017-10-29 | End: 2017-10-29

## 2017-10-29 RX ORDER — POTASSIUM CHLORIDE 750 MG/1
40 TABLET, FILM COATED, EXTENDED RELEASE ORAL
Status: ACTIVE | OUTPATIENT
Start: 2017-10-29 | End: 2017-10-30

## 2017-10-29 RX ORDER — FOLIC ACID 1 MG/1
1 TABLET ORAL DAILY
Status: DISCONTINUED | OUTPATIENT
Start: 2017-10-29 | End: 2017-10-31 | Stop reason: HOSPADM

## 2017-10-29 RX ADMIN — Medication 10 ML: at 10:41

## 2017-10-29 RX ADMIN — SODIUM CHLORIDE 40 MG: 9 INJECTION INTRAMUSCULAR; INTRAVENOUS; SUBCUTANEOUS at 21:12

## 2017-10-29 RX ADMIN — ONDANSETRON 4 MG: 2 INJECTION INTRAMUSCULAR; INTRAVENOUS at 21:12

## 2017-10-29 RX ADMIN — POLYETHYLENE GLYCOL-3350 AND ELECTROLYTES 4000 ML: 236; 6.74; 5.86; 2.97; 22.74 POWDER, FOR SOLUTION ORAL at 14:48

## 2017-10-29 RX ADMIN — COLLAGENASE SANTYL: 250 OINTMENT TOPICAL at 10:38

## 2017-10-29 RX ADMIN — Medication 10 ML: at 16:06

## 2017-10-29 RX ADMIN — SODIUM CHLORIDE, SODIUM LACTATE, CALCIUM CHLORIDE, MAGNESIUM CHLORIDE AND DEXTROSE 2000 ML: 2.5; 538; 448; 18.3; 5.08 INJECTION, SOLUTION INTRAPERITONEAL at 10:40

## 2017-10-29 RX ADMIN — SODIUM CHLORIDE 40 MG: 9 INJECTION INTRAMUSCULAR; INTRAVENOUS; SUBCUTANEOUS at 10:38

## 2017-10-29 RX ADMIN — Medication 250 MCG: at 10:40

## 2017-10-29 RX ADMIN — SODIUM CHLORIDE, SODIUM LACTATE, CALCIUM CHLORIDE, MAGNESIUM CHLORIDE AND DEXTROSE 2000 ML: 2.5; 538; 448; 18.3; 5.08 INJECTION, SOLUTION INTRAPERITONEAL at 22:32

## 2017-10-29 RX ADMIN — SODIUM CHLORIDE, SODIUM LACTATE, CALCIUM CHLORIDE, MAGNESIUM CHLORIDE AND DEXTROSE 2000 ML: 2.5; 538; 448; 18.3; 5.08 INJECTION, SOLUTION INTRAPERITONEAL at 02:40

## 2017-10-29 RX ADMIN — HYDROCODONE BITARTRATE AND ACETAMINOPHEN 1 TABLET: 5; 325 TABLET ORAL at 11:22

## 2017-10-29 RX ADMIN — Medication 10 ML: at 21:13

## 2017-10-29 RX ADMIN — POTASSIUM CHLORIDE 40 MEQ: 750 TABLET, FILM COATED, EXTENDED RELEASE ORAL at 11:22

## 2017-10-29 RX ADMIN — POLYETHYLENE GLYCOL-3350 AND ELECTROLYTES 4000 ML: 236; 6.74; 5.86; 2.97; 22.74 POWDER, FOR SOLUTION ORAL at 14:49

## 2017-10-29 RX ADMIN — FOLIC ACID 1 MG: 1 TABLET ORAL at 12:00

## 2017-10-29 RX ADMIN — SODIUM CHLORIDE, SODIUM LACTATE, CALCIUM CHLORIDE, MAGNESIUM CHLORIDE AND DEXTROSE 2000 ML: 2.5; 538; 448; 18.3; 5.08 INJECTION, SOLUTION INTRAPERITONEAL at 16:06

## 2017-10-29 RX ADMIN — HYDROCORTISONE 2.5%: 25 CREAM TOPICAL at 10:38

## 2017-10-29 RX ADMIN — HYDROCODONE BITARTRATE AND ACETAMINOPHEN 1 TABLET: 5; 325 TABLET ORAL at 16:05

## 2017-10-29 RX ADMIN — LEVETIRACETAM 1000 MG: 500 TABLET, FILM COATED ORAL at 21:12

## 2017-10-29 RX ADMIN — Medication 10 ML: at 14:00

## 2017-10-29 RX ADMIN — Medication 10 ML: at 06:19

## 2017-10-29 RX ADMIN — HYDROCORTISONE 2.5%: 25 CREAM TOPICAL at 16:07

## 2017-10-29 RX ADMIN — Medication 10 ML: at 06:18

## 2017-10-29 NOTE — PROGRESS NOTES
Progress Note    Patient: Zak Arizmendi MRN: [id number]  SSN: [id number]    YOB: 1980  Age: 44 y.o. Sex: female      Admit Date: 10/27/2017    1 Day Post-Op    Procedure:  Procedure(s): INFUSION CATHETER INSERTION    Subjective:     Patient without new complaints  Going for colonoscopy tomorrow. Objective:     Visit Vitals    /90 (BP 1 Location: Right arm, BP Patient Position: Sitting)    Pulse 99    Temp 97.6 °F (36.4 °C)    Resp 18    Ht 5' 1\" (1.549 m)    Wt 253 lb 1.4 oz (114.8 kg)    SpO2 100%    BMI 47.82 kg/m2       Temp (24hrs), Av.8 °F (36.6 °C), Min:97.6 °F (36.4 °C), Max:98.1 °F (36.7 °C)      Physical Exam:    GENERAL: alert, cooperative, no distress, appears stated age, EXTREMITIES: R leg- there is some necrotic material that is starting to loosen with santyl    Data Review: images and reports reviewed    Lab Review: All lab results for the last 24 hours reviewed. Recent Results (from the past 24 hour(s))   CBC WITH AUTOMATED DIFF    Collection Time: 10/29/17  3:33 AM   Result Value Ref Range    WBC 7.2 3.6 - 11.0 K/uL    RBC 3.14 (L) 3.80 - 5.20 M/uL    HGB 8.8 (L) 11.5 - 16.0 g/dL    HCT 27.1 (L) 35.0 - 47.0 %    MCV 86.3 80.0 - 99.0 FL    MCH 28.0 26.0 - 34.0 PG    MCHC 32.5 30.0 - 36.5 g/dL    RDW 18.5 (H) 11.5 - 14.5 %    PLATELET 655 226 - 829 K/uL    NEUTROPHILS 80 (H) 32 - 75 %    LYMPHOCYTES 13 12 - 49 %    MONOCYTES 6 5 - 13 %    EOSINOPHILS 1 0 - 7 %    BASOPHILS 0 0 - 1 %    ABS. NEUTROPHILS 5.8 1.8 - 8.0 K/UL    ABS. LYMPHOCYTES 0.9 0.8 - 3.5 K/UL    ABS. MONOCYTES 0.4 0.0 - 1.0 K/UL    ABS. EOSINOPHILS 0.0 0.0 - 0.4 K/UL    ABS.  BASOPHILS 0.0 0.0 - 0.1 K/UL   METABOLIC PANEL, COMPREHENSIVE    Collection Time: 10/29/17  3:33 AM   Result Value Ref Range    Sodium 139 136 - 145 mmol/L    Potassium 3.4 (L) 3.5 - 5.1 mmol/L    Chloride 89 (L) 97 - 108 mmol/L    CO2 19 (L) 21 - 32 mmol/L    Anion gap 31 (H) 5 - 15 mmol/L    Glucose 87 65 - 100 mg/dL BUN 55 (H) 6 - 20 MG/DL    Creatinine 13.20 (H) 0.55 - 1.02 MG/DL    BUN/Creatinine ratio 4 (L) 12 - 20      GFR est AA 4 (L) >60 ml/min/1.73m2    GFR est non-AA 3 (L) >60 ml/min/1.73m2    Calcium 7.9 (L) 8.5 - 10.1 MG/DL    Bilirubin, total 0.6 0.2 - 1.0 MG/DL    ALT (SGPT) 34 12 - 78 U/L    AST (SGOT) 21 15 - 37 U/L    Alk.  phosphatase 124 (H) 45 - 117 U/L    Protein, total 7.3 6.4 - 8.2 g/dL    Albumin 1.9 (L) 3.5 - 5.0 g/dL    Globulin 5.4 (H) 2.0 - 4.0 g/dL    A-G Ratio 0.4 (L) 1.1 - 2.2     MAGNESIUM    Collection Time: 10/29/17  3:33 AM   Result Value Ref Range    Magnesium 1.6 1.6 - 2.4 mg/dL   PHOSPHORUS    Collection Time: 10/29/17  3:33 AM   Result Value Ref Range    Phosphorus 7.7 (H) 2.6 - 4.7 MG/DL       Assessment:     Hospital Problems  Date Reviewed: 10/27/2017          Codes Class Noted POA    * (Principal)Lower GI bleed ICD-10-CM: K92.2  ICD-9-CM: 578.9  10/27/2017 Yes        ESRD on peritoneal dialysis [facility]) ICD-10-CM: N18.6, Z99.2  ICD-9-CM: 585.6, V45.11  6/29/2011 Unknown              Plan/Recommendations/Medical Decision Making:     Right leg wound-Continue santyl and dressing changes  Will follow./    Signed By: Ashli Blake MD     October 29, 2017

## 2017-10-29 NOTE — PROGRESS NOTES
NAME: Cassia Faustin        :  1980        MRN:  [id number]        Assessment :    Plan:  --HSEQ-YTXH-Nv. Gehr-MCV  Anemia  GI bleed  Hypokalemia --Continue with current PD. Replete K PO. Continue phos binder. Continue EPO. Subjective:     Chief Complaint:  \" I'm tired. \"  No N/V. No dyspnea. No cloudy PD fluid. Review of Systems:    Symptom Y/N Comments  Symptom Y/N Comments   Fever/Chills    Chest Pain     Poor Appetite    Edema     Cough    Abdominal Pain     Sputum    Joint Pain     SOB/PARKER    Pruritis/Rash     Nausea/vomit    Tolerating PT/OT     Diarrhea    Tolerating Diet     Constipation    Other       Could not obtain due to:      Objective:     VITALS:   Last 24hrs VS reviewed since prior progress note.  Most recent are:  Visit Vitals    BP 95/73 (BP 1 Location: Right arm, BP Patient Position: At rest)    Pulse 95    Temp 97.6 °F (36.4 °C)    Resp 18    Ht 5' 1\" (1.549 m)    Wt 114.8 kg (253 lb 1.4 oz)    SpO2 94%    BMI 47.82 kg/m2       Intake/Output Summary (Last 24 hours) at 10/29/17 1255  Last data filed at 10/29/17 1025   Gross per 24 hour   Intake           6873.8 ml   Output             5900 ml   Net            973.8 ml      Telemetry Reviewed:     PHYSICAL EXAM:  General: NAD  CTA  Abd soft  No edema      Lab Data Reviewed: (see below)    Medications Reviewed: (see below)    PMH/SH reviewed - no change compared to H&P  ________________________________________________________________________  Care Plan discussed with:  Patient     Family      RN     Care Manager                    Consultant:          Comments   >50% of visit spent in counseling and coordination of care       ________________________________________________________________________  Arpan Aly MD     Procedures: see electronic medical records for all procedures/Xrays and details which  were not copied into this note but were reviewed prior to creation of Plan. LABS:  Recent Labs      10/29/17   0333  10/28/17   0949   WBC  7.2  6.0   HGB  8.8*  6.9*   HCT  27.1*  22.5*   PLT  315  310     Recent Labs      10/29/17   0333  10/28/17   0336  10/27/17   1317   NA  139  137  137   K  3.4*  3.1*  3.4*   CL  89*  90*  91*   CO2  19*  20*  21   BUN  55*  57*  52*   CREA  13.20*  13.10*  12.30*   GLU  87  73  88   CA  7.9*  7.7*  8.2*   MG  1.6   --    --    PHOS  7.7*   --    --      Recent Labs      10/29/17   0333  10/27/17   1317   SGOT  21  33   AP  124*  149*   TP  7.3  7.9   ALB  1.9*  2.3*   GLOB  5.4*  5.6*     Recent Labs      10/27/17   1338   INR  1.2*   PTP  11.9*   APTT  28.5      Recent Labs      10/28/17   0336   TIBC  129*   PSAT  49   FERR  764*      Lab Results   Component Value Date/Time    Folate 4.2 10/28/2017 03:36 AM      No results for input(s): PH, PCO2, PO2 in the last 72 hours. No results for input(s): CPK, CKMB in the last 72 hours.     No lab exists for component: TROPONINI  No components found for: [place]  Lab Results   Component Value Date/Time    Color YELLOW 08/11/2012 06:55 AM    Appearance CLOUDY 08/11/2012 06:55 AM    Specific gravity 1.025 08/11/2012 06:55 AM    pH (UA) 6.0 08/11/2012 06:55 AM    Protein 100 08/11/2012 06:55 AM    Glucose NEGATIVE  08/11/2012 06:55 AM    Ketone NEGATIVE  08/11/2012 06:55 AM    Urobilinogen 0.2 08/11/2012 06:55 AM    Nitrites NEGATIVE  08/11/2012 06:55 AM    Leukocyte Esterase TRACE 08/11/2012 06:55 AM    Epithelial cells 20-50 08/11/2012 06:55 AM    Bacteria 4+ 08/11/2012 06:55 AM    WBC 20-50 08/11/2012 06:55 AM    RBC 5-10 08/11/2012 06:55 AM       MEDICATIONS:  Current Facility-Administered Medications   Medication Dose Route Frequency    [START ON 10/30/2017] cyanocobalamin (VITAMIN B12) tablet 500 mcg  500 mcg Oral DAILY    folic acid (FOLVITE) tablet 1 mg  1 mg Oral DAILY    peg 3350-electrolytes (COLYTE) 4000 mL  4,000 mL Oral ONCE    potassium chloride SR (KLOR-CON 10) tablet 40 mEq  40 mEq Oral NOW    0.9% sodium chloride infusion 250 mL  250 mL IntraVENous PRN    sodium chloride (NS) flush 20 mL  20 mL InterCATHeter PRN    sodium chloride (NS) flush 10 mL  10 mL InterCATHeter Q24H    sodium chloride (NS) flush 10 mL  10 mL InterCATHeter PRN    sodium chloride (NS) flush 10 mL  10 mL InterCATHeter Q8H    alteplase (CATHFLO) 1 mg in sterile water (preservative free) 1 mL injection  1 mg InterCATHeter PRN    bacitracin 500 unit/gram packet 1 Packet  1 Packet Topical PRN    ondansetron (ZOFRAN) injection 4 mg  4 mg IntraVENous Q4H PRN    collagenase (SANTYL) 250 unit/gram ointment   Topical DAILY    [START ON 10/30/2017] sodium thiosulfate 25 g in 100 mL infusion   IntraVENous DIALYSIS MON, WED & FRI    pantoprazole (PROTONIX) 40 mg in sodium chloride 0.9 % 10 mL injection  40 mg IntraVENous Q12H    prochlorperazine (COMPAZINE) with saline injection 10 mg  10 mg IntraVENous Q6H PRN    epoetin haylie (EPOGEN;PROCRIT) injection 8,000 Units  8,000 Units SubCUTAneous DIALYSIS TUE, THU & SAT    peritoneal dialysis DEXTROSE 2.5% (2.5 mEq/L low calcium) solution 2,000 mL  2,000 mL IntraPERitoneal QID    levETIRAcetam (KEPPRA) tablet 1,000 mg  1,000 mg Oral QHS    sevelamer carbonate (RENVELA) tab 1,600 mg  1,600 mg Oral TID    sodium chloride (NS) flush 5-10 mL  5-10 mL IntraVENous Q8H    sodium chloride (NS) flush 5-10 mL  5-10 mL IntraVENous PRN    HYDROcodone-acetaminophen (NORCO) 5-325 mg per tablet 1 Tab  1 Tab Oral Q4H PRN    morphine injection 5 mg  5 mg IntraVENous Q4H PRN    hydrocortisone (ANUSOL-HC) 2.5 % rectal cream   PeriANAL BID    midodrine (PROAMITINE) tablet 5 mg  5 mg Oral Q MON, WED & FRI    PEG 3350-Electrolytes (GO-LYTELY) SUSPENSION 4,000 mL  4,000 mL Oral ONCE

## 2017-10-29 NOTE — PROGRESS NOTES
Bedside and Verbal shift change report given to Connor (oncoming nurse) by Andrew Womack (offgoing nurse). Report included the following information SBAR, Kardex, Procedure Summary and Intake/Output. 1800: Patient vommitted 400 ls  of clear liquid, declined an antimetic.

## 2017-10-29 NOTE — PROGRESS NOTES
Gastroenterology Progress Note    10/29/2017    Admit Date: 10/27/2017    Subjective: Follow up for: GI bleeding(Jose centeno Young)      Feels ok. No more blood MA seen. Hgb and BP is better.       Current Facility-Administered Medications   Medication Dose Route Frequency    [START ON 10/30/2017] cyanocobalamin (VITAMIN B12) tablet 500 mcg  500 mcg Oral DAILY    folic acid (FOLVITE) tablet 1 mg  1 mg Oral DAILY    peg 3350-electrolytes (COLYTE) 4000 mL  4,000 mL Oral ONCE    0.9% sodium chloride infusion 250 mL  250 mL IntraVENous PRN    sodium chloride (NS) flush 20 mL  20 mL InterCATHeter PRN    sodium chloride (NS) flush 10 mL  10 mL InterCATHeter Q24H    sodium chloride (NS) flush 10 mL  10 mL InterCATHeter PRN    sodium chloride (NS) flush 10 mL  10 mL InterCATHeter Q8H    alteplase (CATHFLO) 1 mg in sterile water (preservative free) 1 mL injection  1 mg InterCATHeter PRN    bacitracin 500 unit/gram packet 1 Packet  1 Packet Topical PRN    ondansetron (ZOFRAN) injection 4 mg  4 mg IntraVENous Q4H PRN    collagenase (SANTYL) 250 unit/gram ointment   Topical DAILY    [START ON 10/30/2017] sodium thiosulfate 25 g in 100 mL infusion   IntraVENous DIALYSIS MON, WED & FRI    pantoprazole (PROTONIX) 40 mg in sodium chloride 0.9 % 10 mL injection  40 mg IntraVENous Q12H    prochlorperazine (COMPAZINE) with saline injection 10 mg  10 mg IntraVENous Q6H PRN    epoetin haylie (EPOGEN;PROCRIT) injection 8,000 Units  8,000 Units SubCUTAneous DIALYSIS TUE, THU & SAT    peritoneal dialysis DEXTROSE 2.5% (2.5 mEq/L low calcium) solution 2,000 mL  2,000 mL IntraPERitoneal QID    levETIRAcetam (KEPPRA) tablet 1,000 mg  1,000 mg Oral QHS    sevelamer carbonate (RENVELA) tab 1,600 mg  1,600 mg Oral TID    sodium chloride (NS) flush 5-10 mL  5-10 mL IntraVENous Q8H    sodium chloride (NS) flush 5-10 mL  5-10 mL IntraVENous PRN    HYDROcodone-acetaminophen (NORCO) 5-325 mg per tablet 1 Tab  1 Tab Oral Q4H PRN    morphine injection 5 mg  5 mg IntraVENous Q4H PRN    hydrocortisone (ANUSOL-HC) 2.5 % rectal cream   PeriANAL BID    midodrine (PROAMITINE) tablet 5 mg  5 mg Oral Q MON, WED & FRI    PEG 3350-Electrolytes (GO-LYTELY) SUSPENSION 4,000 mL  4,000 mL Oral ONCE        Objective:     Blood pressure 95/73, pulse 95, temperature 97.6 °F (36.4 °C), resp. rate 18, height 5' 1\" (1.549 m), weight 114.8 kg (253 lb 1.4 oz), last menstrual period 10/04/2017, SpO2 94 %. 10/29 0701 - 10/29 1900  In: 2000   Out: 2400     10/27 1901 - 10/29 0700  In: 7373.8   Out: 4700         Physical Examination:       General:AAOx 3. HEENT: EOMI,  Chest: CTA  Heart S1,S2,  GI: soft and NT. + BS        Data Review    Recent Results (from the past 24 hour(s))   CBC WITH AUTOMATED DIFF    Collection Time: 10/29/17  3:33 AM   Result Value Ref Range    WBC 7.2 3.6 - 11.0 K/uL    RBC 3.14 (L) 3.80 - 5.20 M/uL    HGB 8.8 (L) 11.5 - 16.0 g/dL    HCT 27.1 (L) 35.0 - 47.0 %    MCV 86.3 80.0 - 99.0 FL    MCH 28.0 26.0 - 34.0 PG    MCHC 32.5 30.0 - 36.5 g/dL    RDW 18.5 (H) 11.5 - 14.5 %    PLATELET 178 627 - 749 K/uL    NEUTROPHILS 80 (H) 32 - 75 %    LYMPHOCYTES 13 12 - 49 %    MONOCYTES 6 5 - 13 %    EOSINOPHILS 1 0 - 7 %    BASOPHILS 0 0 - 1 %    ABS. NEUTROPHILS 5.8 1.8 - 8.0 K/UL    ABS. LYMPHOCYTES 0.9 0.8 - 3.5 K/UL    ABS. MONOCYTES 0.4 0.0 - 1.0 K/UL    ABS. EOSINOPHILS 0.0 0.0 - 0.4 K/UL    ABS.  BASOPHILS 0.0 0.0 - 0.1 K/UL   METABOLIC PANEL, COMPREHENSIVE    Collection Time: 10/29/17  3:33 AM   Result Value Ref Range    Sodium 139 136 - 145 mmol/L    Potassium 3.4 (L) 3.5 - 5.1 mmol/L    Chloride 89 (L) 97 - 108 mmol/L    CO2 19 (L) 21 - 32 mmol/L    Anion gap 31 (H) 5 - 15 mmol/L    Glucose 87 65 - 100 mg/dL    BUN 55 (H) 6 - 20 MG/DL    Creatinine 13.20 (H) 0.55 - 1.02 MG/DL    BUN/Creatinine ratio 4 (L) 12 - 20      GFR est AA 4 (L) >60 ml/min/1.73m2    GFR est non-AA 3 (L) >60 ml/min/1.73m2    Calcium 7.9 (L) 8.5 - 10.1 MG/DL    Bilirubin, total 0.6 0.2 - 1.0 MG/DL    ALT (SGPT) 34 12 - 78 U/L    AST (SGOT) 21 15 - 37 U/L    Alk. phosphatase 124 (H) 45 - 117 U/L    Protein, total 7.3 6.4 - 8.2 g/dL    Albumin 1.9 (L) 3.5 - 5.0 g/dL    Globulin 5.4 (H) 2.0 - 4.0 g/dL    A-G Ratio 0.4 (L) 1.1 - 2.2     MAGNESIUM    Collection Time: 10/29/17  3:33 AM   Result Value Ref Range    Magnesium 1.6 1.6 - 2.4 mg/dL   PHOSPHORUS    Collection Time: 10/29/17  3:33 AM   Result Value Ref Range    Phosphorus 7.7 (H) 2.6 - 4.7 MG/DL     Recent Labs      10/29/17   0333  10/28/17   0949   WBC  7.2  6.0   HGB  8.8*  6.9*   HCT  27.1*  22.5*   PLT  315  310     Recent Labs      10/29/17   0333  10/28/17   0336  10/27/17   1317   NA  139  137  137   K  3.4*  3.1*  3.4*   CL  89*  90*  91*   CO2  19*  20*  21   BUN  55*  57*  52*   CREA  13.20*  13.10*  12.30*   GLU  87  73  88   CA  7.9*  7.7*  8.2*   MG  1.6   --    --    PHOS  7.7*   --    --      Recent Labs      10/29/17   0333  10/27/17   1317   SGOT  21  33   AP  124*  149*   TP  7.3  7.9   ALB  1.9*  2.3*   GLOB  5.4*  5.6*     Recent Labs      10/27/17   1338   INR  1.2*   PTP  11.9*   APTT  28.5      Recent Labs      10/28/17   0336   TIBC  129*   PSAT  49   FERR  764*      Lab Results   Component Value Date/Time    Folate 4.2 10/28/2017 03:36 AM      No results for input(s): PH, PCO2, PO2 in the last 72 hours. No results for input(s): CPK, CKNDX, TROIQ in the last 72 hours.     No lab exists for component: CPKMB  Lab Results   Component Value Date/Time    Cholesterol, total 190 06/29/2011 03:20 AM    HDL Cholesterol 65 06/29/2011 03:20 AM    LDL, calculated 111.6 06/29/2011 03:20 AM    Triglyceride 67 06/29/2011 03:20 AM    CHOL/HDL Ratio 2.9 06/29/2011 03:20 AM     No components found for: [place]  Lab Results   Component Value Date/Time    Color YELLOW 08/11/2012 06:55 AM    Appearance CLOUDY 08/11/2012 06:55 AM    Specific gravity 1.025 08/11/2012 06:55 AM    pH (UA) 6.0 08/11/2012 06:55 AM Protein 100 08/11/2012 06:55 AM    Glucose NEGATIVE  08/11/2012 06:55 AM    Ketone NEGATIVE  08/11/2012 06:55 AM    Urobilinogen 0.2 08/11/2012 06:55 AM    Nitrites NEGATIVE  08/11/2012 06:55 AM    Leukocyte Esterase TRACE 08/11/2012 06:55 AM    Epithelial cells 20-50 08/11/2012 06:55 AM    Bacteria 4+ 08/11/2012 06:55 AM    WBC 20-50 08/11/2012 06:55 AM    RBC 5-10 08/11/2012 06:55 AM        ROS: -CP, SOB, Dysuria, palpitations, cough. Assessment:    Principal Problem:    Lower GI bleed (10/27/2017)    Active Problems:    ESRD on peritoneal dialysis ([facility] [address] (6/29/2011)             Plan/Discussion:     1. EGD/colonoscopy with Dr Teresa Haley tomorrow planned. 2. Prep (PEG 3350) ordered in [facility]. 3. NPO after MN. May have clears today. 4. Continue IV PPI bid. 5. Daily CBC        Signed By: Martin Hill MD    10/29/2017  1:35 PM

## 2017-10-29 NOTE — PROGRESS NOTES
Hospitalist Progress Note  Coleman Huang MD  Answering service: [phone] OR [phone] from in house phone      Date of Service:  10/29/2017  NAME:  Thomas Sam  :  1980  MRN:  [id number]      Admission Summary:   43 yo woman with ESRD on PD s/p failed renal transplant, calciphylaxis, h/o DVT and bilateral PE on apixaban, seizure disorder, and HTN presented to the ED from home on 10/27/17 with dark red rectal bleeding since day PTA. Patient takes naproxen daily then started ibuprofen recently for pain. She was admitted for GI bleed.      Interval history / Subjective:   Feeling slightly better overall; left hip and right thigh wounds less painful; no further vomiting     Assessment & Plan:     GI bleed (POA)  - likely UGIB due to NSAIDs  - GI following: plan for colonoscopy Mon 10/30; likely needs EGD too due to melena  - avoid NSAIDs  - hold apixaban    Acute blood loss anemia (POA)  - likely on anemia of CKD  - B12 low-normal and will increase home dose  - folate low and will supplement  - iron WNL  - s/p 2 units PRBC due to persistent rectal bleeding and Hb 7    ESRD on PD   - follows at [facility] [place]; Renal consulted here for PD orders  - EPO TIW started by Renal    H/o DVT and bilateral PE,   - apixaban on hold for colonoscopy on Mon 10/30    Calciphylaxis  - sees [facility] [place] [facility]; currently on TIW sodium thiosulfate infusions via right chest wall Brodie catheter  - resume qMWF sodium thiosulfate infusion (1st dose Sat 10/28 due to missed dose on Fri 10/27)    Right medial thigh and left hip wounds (POA)   - wound care consulted  - General Surgery consulted for deep right thigh wound: recommended nonsurgical management     Hypokalemia (POA) - replete judiciously prn  Hyperphosphatemia - continue phos binder    Chronic hypotension - continue midodrine    Seizure disorder - seizure precautions; continue AED    HTN - controlled on current meds    Morbid obesity, Body mass index is 47.82 kg/(m^2). Code status: Full  DVT prophylaxis: SCDs    Care Plan discussed with: Patient/Family and Nurse  Disposition: TBD. Normally cared for at [facility] Problems  Date Reviewed: 10/27/2017          Codes Class Noted POA    * (Principal)Lower GI bleed ICD-10-CM: K92.2  ICD-9-CM: 578.9  10/27/2017 Yes        ESRD on peritoneal dialysis [facility]) ICD-10-CM: N18.6, Z99.2  ICD-9-CM: 585.6, V45.11  6/29/2011 Unknown            Review of Systems:   Pertinent items are noted in HPI. Vital Signs:    Last 24hrs VS reviewed since prior progress note.  Most recent are:  Visit Vitals    /52 (BP 1 Location: Right arm, BP Patient Position: At rest)    Pulse 91    Temp 97.7 °F (36.5 °C)    Resp 17    Ht 5' 1\" (1.549 m)    Wt 114.8 kg (253 lb 1.4 oz)    SpO2 93%    BMI 47.82 kg/m2       Intake/Output Summary (Last 24 hours) at 10/29/17 5400  Last data filed at 10/29/17 0203   Gross per 24 hour   Intake           4873.8 ml   Output             3500 ml   Net           1373.8 ml      Physical Examination:     Constitutional:  awake, no acute distress, cooperative, pleasant, obese   ENT:  oral mucosa moist, oropharynx benign  Neck supple, no masses   Resp:  CTA bilaterally, no wheezing/rhonchi/rales   CV:  regular rhythm, normal rate, no m/r/g appreciated, b/l LE edema, +pulses    GI:  +BS, soft, non distended, mildly tender, obese, +PD catheter    Musculoskeletal:  moves all extremities    Neurologic:  AAOx3, NFD     Skin:  left hip calciphylaxis lesion; deep right medial thigh wound  Eyes:  PERRL    Data Review:    Review and/or order of clinical lab test  Review and/or order of tests in the radiology section of CPT  Review and/or order of tests in the medicine section of CPT    Labs:     Recent Labs      10/29/17   0333  10/28/17   0949   WBC  7.2  6.0   HGB  8.8*  6.9*   HCT  27.1*  22.5*   PLT  315  310     Recent Labs      10/29/17   0333  10/28/17   0332 10/27/17   1317   NA  139  137  137   K  3.4*  3.1*  3.4*   CL  89*  90*  91*   CO2  19*  20*  21   BUN  55*  57*  52*   CREA  13.20*  13.10*  12.30*   GLU  87  73  88   CA  7.9*  7.7*  8.2*   MG  1.6   --    --    PHOS  7.7*   --    --      Recent Labs      10/29/17   0333  10/27/17   1317   SGOT  21  33   ALT  34  48   AP  124*  149*   TBILI  0.6  1.0   TP  7.3  7.9   ALB  1.9*  2.3*   GLOB  5.4*  5.6*     Recent Labs      10/27/17   1338   INR  1.2*   PTP  11.9*   APTT  28.5      Recent Labs      10/28/17   0336   TIBC  129*   PSAT  49   FERR  764*      Lab Results   Component Value Date/Time    Folate 4.2 10/28/2017 03:36 AM      No results for input(s): PH, PCO2, PO2 in the last 72 hours. No results for input(s): CPK, CKNDX, TROIQ in the last 72 hours.     No lab exists for component: CPKMB  Lab Results   Component Value Date/Time    Cholesterol, total 190 06/29/2011 03:20 AM    HDL Cholesterol 65 06/29/2011 03:20 AM    LDL, calculated 111.6 06/29/2011 03:20 AM    Triglyceride 67 06/29/2011 03:20 AM    CHOL/HDL Ratio 2.9 06/29/2011 03:20 AM     Lab Results   Component Value Date/Time    Glucose (POC) 102 08/09/2017 01:38 AM    Glucose (POC) 74 11/18/2010 11:21 PM     Lab Results   Component Value Date/Time    Color YELLOW 08/11/2012 06:55 AM    Appearance CLOUDY 08/11/2012 06:55 AM    Specific gravity 1.025 08/11/2012 06:55 AM    pH (UA) 6.0 08/11/2012 06:55 AM    Protein 100 08/11/2012 06:55 AM    Glucose NEGATIVE  08/11/2012 06:55 AM    Ketone NEGATIVE  08/11/2012 06:55 AM    Urobilinogen 0.2 08/11/2012 06:55 AM    Nitrites NEGATIVE  08/11/2012 06:55 AM    Leukocyte Esterase TRACE 08/11/2012 06:55 AM    Epithelial cells 20-50 08/11/2012 06:55 AM    Bacteria 4+ 08/11/2012 06:55 AM    WBC 20-50 08/11/2012 06:55 AM    RBC 5-10 08/11/2012 06:55 AM     Medications Reviewed:     Current Facility-Administered Medications   Medication Dose Route Frequency    0.9% sodium chloride infusion 250 mL  250 mL IntraVENous PRN    sodium chloride (NS) flush 20 mL  20 mL InterCATHeter PRN    sodium chloride (NS) flush 10 mL  10 mL InterCATHeter Q24H    sodium chloride (NS) flush 10 mL  10 mL InterCATHeter PRN    sodium chloride (NS) flush 10 mL  10 mL InterCATHeter Q8H    alteplase (CATHFLO) 1 mg in sterile water (preservative free) 1 mL injection  1 mg InterCATHeter PRN    bacitracin 500 unit/gram packet 1 Packet  1 Packet Topical PRN    ondansetron (ZOFRAN) injection 4 mg  4 mg IntraVENous Q4H PRN    collagenase (SANTYL) 250 unit/gram ointment   Topical DAILY    [START ON 10/30/2017] sodium thiosulfate 25 g in 100 mL infusion   IntraVENous DIALYSIS MON, WED & FRI    pantoprazole (PROTONIX) 40 mg in sodium chloride 0.9 % 10 mL injection  40 mg IntraVENous Q12H    prochlorperazine (COMPAZINE) with saline injection 10 mg  10 mg IntraVENous Q6H PRN    epoetin haylie (EPOGEN;PROCRIT) injection 8,000 Units  8,000 Units SubCUTAneous DIALYSIS TUE, THU & SAT    peritoneal dialysis DEXTROSE 2.5% (2.5 mEq/L low calcium) solution 2,000 mL  2,000 mL IntraPERitoneal QID    cyanocobalamin (VITAMIN B12) tablet 250 mcg  250 mcg Oral DAILY    levETIRAcetam (KEPPRA) tablet 1,000 mg  1,000 mg Oral QHS    sevelamer carbonate (RENVELA) tab 1,600 mg  1,600 mg Oral TID    sodium chloride (NS) flush 5-10 mL  5-10 mL IntraVENous Q8H    sodium chloride (NS) flush 5-10 mL  5-10 mL IntraVENous PRN    HYDROcodone-acetaminophen (NORCO) 5-325 mg per tablet 1 Tab  1 Tab Oral Q4H PRN    morphine injection 5 mg  5 mg IntraVENous Q4H PRN    hydrocortisone (ANUSOL-HC) 2.5 % rectal cream   PeriANAL BID    midodrine (PROAMITINE) tablet 5 mg  5 mg Oral Q MON, WED & FRI    sorbitol 70 % solution 30 mL  30 mL Oral QID    PEG 3350-Electrolytes (GO-LYTELY) SUSPENSION 4,000 mL  4,000 mL Oral ONCE     ______________________________________________________________________  EXPECTED LENGTH OF STAY: - - -  ACTUAL LENGTH OF STAY:          2                 [facility] Timothy Henley MD

## 2017-10-30 ENCOUNTER — ANESTHESIA EVENT (OUTPATIENT)
Dept: ENDOSCOPY | Age: 37
DRG: 377 | End: 2017-10-30
Payer: MEDICARE

## 2017-10-30 ENCOUNTER — ANESTHESIA (OUTPATIENT)
Dept: ENDOSCOPY | Age: 37
DRG: 377 | End: 2017-10-30
Payer: MEDICARE

## 2017-10-30 LAB
ALBUMIN SERPL-MCNC: 2 G/DL (ref 3.5–5)
ANION GAP SERPL CALC-SCNC: 29 MMOL/L (ref 5–15)
BASOPHILS # BLD: 0 K/UL (ref 0–0.1)
BASOPHILS NFR BLD: 0 % (ref 0–1)
BUN SERPL-MCNC: 51 MG/DL (ref 6–20)
BUN/CREAT SERPL: 4 (ref 12–20)
CALCIUM SERPL-MCNC: 8.3 MG/DL (ref 8.5–10.1)
CHLORIDE SERPL-SCNC: 89 MMOL/L (ref 97–108)
CO2 SERPL-SCNC: 19 MMOL/L (ref 21–32)
CREAT SERPL-MCNC: 12.9 MG/DL (ref 0.55–1.02)
CRP SERPL-MCNC: 10.9 MG/DL (ref 0–0.6)
EOSINOPHIL # BLD: 0.1 K/UL (ref 0–0.4)
EOSINOPHIL NFR BLD: 1 % (ref 0–7)
ERYTHROCYTE [DISTWIDTH] IN BLOOD BY AUTOMATED COUNT: 18 % (ref 11.5–14.5)
GLUCOSE SERPL-MCNC: 83 MG/DL (ref 65–100)
H PYLORI FROM TISSUE: NEGATIVE
HCT VFR BLD AUTO: 27.4 % (ref 35–47)
HGB BLD-MCNC: 8.8 G/DL (ref 11.5–16)
KIT LOT NO., HCLOLOT: NORMAL
LYMPHOCYTES # BLD: 0.8 K/UL (ref 0.8–3.5)
LYMPHOCYTES NFR BLD: 12 % (ref 12–49)
MAGNESIUM SERPL-MCNC: 1.6 MG/DL (ref 1.6–2.4)
MCH RBC QN AUTO: 27.8 PG (ref 26–34)
MCHC RBC AUTO-ENTMCNC: 32.1 G/DL (ref 30–36.5)
MCV RBC AUTO: 86.4 FL (ref 80–99)
MONOCYTES # BLD: 0.4 K/UL (ref 0–1)
MONOCYTES NFR BLD: 6 % (ref 5–13)
NEGATIVE CONTROL: NEGATIVE
NEUTS SEG # BLD: 5.6 K/UL (ref 1.8–8)
NEUTS SEG NFR BLD: 81 % (ref 32–75)
PHOSPHATE SERPL-MCNC: 6.9 MG/DL (ref 2.6–4.7)
PLATELET # BLD AUTO: 310 K/UL (ref 150–400)
POSITIVE CONTROL: POSITIVE
POTASSIUM SERPL-SCNC: 3.3 MMOL/L (ref 3.5–5.1)
RBC # BLD AUTO: 3.17 M/UL (ref 3.8–5.2)
SODIUM SERPL-SCNC: 137 MMOL/L (ref 136–145)
WBC # BLD AUTO: 6.9 K/UL (ref 3.6–11)

## 2017-10-30 PROCEDURE — 83735 ASSAY OF MAGNESIUM: CPT | Performed by: INTERNAL MEDICINE

## 2017-10-30 PROCEDURE — 74011250637 HC RX REV CODE- 250/637: Performed by: INTERNAL MEDICINE

## 2017-10-30 PROCEDURE — 88342 IMHCHEM/IMCYTCHM 1ST ANTB: CPT | Performed by: SPECIALIST

## 2017-10-30 PROCEDURE — 88305 TISSUE EXAM BY PATHOLOGIST: CPT | Performed by: SPECIALIST

## 2017-10-30 PROCEDURE — A4722 DIALYS SOL FLD VOL > 1999CC: HCPCS | Performed by: INTERNAL MEDICINE

## 2017-10-30 PROCEDURE — 74011250637 HC RX REV CODE- 250/637: Performed by: FAMILY MEDICINE

## 2017-10-30 PROCEDURE — 85025 COMPLETE CBC W/AUTO DIFF WBC: CPT | Performed by: INTERNAL MEDICINE

## 2017-10-30 PROCEDURE — 77030018846 HC SOL IRR STRL H20 ICUM -A

## 2017-10-30 PROCEDURE — 86671 FUNGUS NES ANTIBODY: CPT | Performed by: SPECIALIST

## 2017-10-30 PROCEDURE — 0DB78ZX EXCISION OF STOMACH, PYLORUS, VIA NATURAL OR ARTIFICIAL OPENING ENDOSCOPIC, DIAGNOSTIC: ICD-10-PCS | Performed by: SPECIALIST

## 2017-10-30 PROCEDURE — 36415 COLL VENOUS BLD VENIPUNCTURE: CPT | Performed by: INTERNAL MEDICINE

## 2017-10-30 PROCEDURE — 80069 RENAL FUNCTION PANEL: CPT | Performed by: INTERNAL MEDICINE

## 2017-10-30 PROCEDURE — 74011250636 HC RX REV CODE- 250/636: Performed by: INTERNAL MEDICINE

## 2017-10-30 PROCEDURE — 65270000029 HC RM PRIVATE

## 2017-10-30 PROCEDURE — 76060000032 HC ANESTHESIA 0.5 TO 1 HR: Performed by: SPECIALIST

## 2017-10-30 PROCEDURE — 0DBP8ZX EXCISION OF RECTUM, VIA NATURAL OR ARTIFICIAL OPENING ENDOSCOPIC, DIAGNOSTIC: ICD-10-PCS | Performed by: SPECIALIST

## 2017-10-30 PROCEDURE — 74011000250 HC RX REV CODE- 250: Performed by: INTERNAL MEDICINE

## 2017-10-30 PROCEDURE — 77030027957 HC TBNG IRR ENDOGTR BUSS -B: Performed by: SPECIALIST

## 2017-10-30 PROCEDURE — 74011250637 HC RX REV CODE- 250/637: Performed by: SPECIALIST

## 2017-10-30 PROCEDURE — 74011250636 HC RX REV CODE- 250/636

## 2017-10-30 PROCEDURE — 0DB48ZX EXCISION OF ESOPHAGOGASTRIC JUNCTION, VIA NATURAL OR ARTIFICIAL OPENING ENDOSCOPIC, DIAGNOSTIC: ICD-10-PCS | Performed by: SPECIALIST

## 2017-10-30 PROCEDURE — 74011000250 HC RX REV CODE- 250

## 2017-10-30 PROCEDURE — 87077 CULTURE AEROBIC IDENTIFY: CPT | Performed by: SPECIALIST

## 2017-10-30 PROCEDURE — 76040000007: Performed by: SPECIALIST

## 2017-10-30 PROCEDURE — 77030009426 HC FCPS BIOP ENDOSC BSC -B: Performed by: SPECIALIST

## 2017-10-30 PROCEDURE — 86140 C-REACTIVE PROTEIN: CPT | Performed by: SPECIALIST

## 2017-10-30 RX ORDER — MIDAZOLAM HYDROCHLORIDE 1 MG/ML
.25-5 INJECTION, SOLUTION INTRAMUSCULAR; INTRAVENOUS
Status: DISCONTINUED | OUTPATIENT
Start: 2017-10-30 | End: 2017-10-30 | Stop reason: HOSPADM

## 2017-10-30 RX ORDER — FENTANYL CITRATE 50 UG/ML
200 INJECTION, SOLUTION INTRAMUSCULAR; INTRAVENOUS
Status: DISCONTINUED | OUTPATIENT
Start: 2017-10-30 | End: 2017-10-30 | Stop reason: HOSPADM

## 2017-10-30 RX ORDER — ATROPINE SULFATE 0.1 MG/ML
0.5 INJECTION INTRAVENOUS
Status: DISCONTINUED | OUTPATIENT
Start: 2017-10-30 | End: 2017-10-30 | Stop reason: HOSPADM

## 2017-10-30 RX ORDER — SODIUM CHLORIDE 9 MG/ML
INJECTION, SOLUTION INTRAVENOUS
Status: DISCONTINUED | OUTPATIENT
Start: 2017-10-30 | End: 2017-10-30 | Stop reason: HOSPADM

## 2017-10-30 RX ORDER — DEXTROMETHORPHAN/PSEUDOEPHED 2.5-7.5/.8
1.2 DROPS ORAL
Status: DISCONTINUED | OUTPATIENT
Start: 2017-10-30 | End: 2017-10-30 | Stop reason: HOSPADM

## 2017-10-30 RX ORDER — LORAZEPAM 2 MG/ML
2 INJECTION INTRAMUSCULAR AS NEEDED
Status: DISCONTINUED | OUTPATIENT
Start: 2017-10-30 | End: 2017-10-30 | Stop reason: HOSPADM

## 2017-10-30 RX ORDER — FENTANYL CITRATE 50 UG/ML
INJECTION, SOLUTION INTRAMUSCULAR; INTRAVENOUS AS NEEDED
Status: DISCONTINUED | OUTPATIENT
Start: 2017-10-30 | End: 2017-10-30 | Stop reason: HOSPADM

## 2017-10-30 RX ORDER — EPINEPHRINE 0.1 MG/ML
1 INJECTION INTRACARDIAC; INTRAVENOUS
Status: DISCONTINUED | OUTPATIENT
Start: 2017-10-30 | End: 2017-10-30 | Stop reason: HOSPADM

## 2017-10-30 RX ORDER — FLUMAZENIL 0.1 MG/ML
0.2 INJECTION INTRAVENOUS
Status: DISCONTINUED | OUTPATIENT
Start: 2017-10-30 | End: 2017-10-30 | Stop reason: HOSPADM

## 2017-10-30 RX ORDER — SODIUM CHLORIDE 0.9 % (FLUSH) 0.9 %
5-10 SYRINGE (ML) INJECTION EVERY 8 HOURS
Status: ACTIVE | OUTPATIENT
Start: 2017-10-30 | End: 2017-10-30

## 2017-10-30 RX ORDER — SODIUM CHLORIDE 9 MG/ML
75 INJECTION, SOLUTION INTRAVENOUS CONTINUOUS
Status: DISPENSED | OUTPATIENT
Start: 2017-10-30 | End: 2017-10-30

## 2017-10-30 RX ORDER — SODIUM CHLORIDE 9 MG/ML
50 INJECTION, SOLUTION INTRAVENOUS CONTINUOUS
Status: DISPENSED | OUTPATIENT
Start: 2017-10-30 | End: 2017-10-30

## 2017-10-30 RX ORDER — SODIUM CHLORIDE 0.9 % (FLUSH) 0.9 %
5-10 SYRINGE (ML) INJECTION AS NEEDED
Status: ACTIVE | OUTPATIENT
Start: 2017-10-30 | End: 2017-10-30

## 2017-10-30 RX ORDER — MIDAZOLAM HYDROCHLORIDE 1 MG/ML
.25-1 INJECTION, SOLUTION INTRAMUSCULAR; INTRAVENOUS
Status: DISCONTINUED | OUTPATIENT
Start: 2017-10-30 | End: 2017-10-30 | Stop reason: HOSPADM

## 2017-10-30 RX ORDER — PROPOFOL 10 MG/ML
INJECTION, EMULSION INTRAVENOUS AS NEEDED
Status: DISCONTINUED | OUTPATIENT
Start: 2017-10-30 | End: 2017-10-30 | Stop reason: HOSPADM

## 2017-10-30 RX ORDER — SODIUM CHLORIDE 0.9 % (FLUSH) 0.9 %
5-10 SYRINGE (ML) INJECTION EVERY 8 HOURS
Status: COMPLETED | OUTPATIENT
Start: 2017-10-30 | End: 2017-10-30

## 2017-10-30 RX ORDER — LIDOCAINE HYDROCHLORIDE 20 MG/ML
INJECTION, SOLUTION EPIDURAL; INFILTRATION; INTRACAUDAL; PERINEURAL AS NEEDED
Status: DISCONTINUED | OUTPATIENT
Start: 2017-10-30 | End: 2017-10-30 | Stop reason: HOSPADM

## 2017-10-30 RX ORDER — NALOXONE HYDROCHLORIDE 0.4 MG/ML
0.4 INJECTION, SOLUTION INTRAMUSCULAR; INTRAVENOUS; SUBCUTANEOUS
Status: DISCONTINUED | OUTPATIENT
Start: 2017-10-30 | End: 2017-10-30 | Stop reason: HOSPADM

## 2017-10-30 RX ORDER — POTASSIUM CHLORIDE 7.45 MG/ML
10 INJECTION INTRAVENOUS
Status: COMPLETED | OUTPATIENT
Start: 2017-10-30 | End: 2017-10-30

## 2017-10-30 RX ORDER — FENTANYL CITRATE 50 UG/ML
INJECTION, SOLUTION INTRAMUSCULAR; INTRAVENOUS
Status: COMPLETED
Start: 2017-10-30 | End: 2017-10-30

## 2017-10-30 RX ORDER — PANTOPRAZOLE SODIUM 40 MG/1
40 TABLET, DELAYED RELEASE ORAL
Status: DISCONTINUED | OUTPATIENT
Start: 2017-10-30 | End: 2017-10-31 | Stop reason: HOSPADM

## 2017-10-30 RX ADMIN — PROPOFOL 30 MG: 10 INJECTION, EMULSION INTRAVENOUS at 09:23

## 2017-10-30 RX ADMIN — HYDROCODONE BITARTRATE AND ACETAMINOPHEN 1 TABLET: 5; 325 TABLET ORAL at 22:38

## 2017-10-30 RX ADMIN — PROPOFOL 30 MG: 10 INJECTION, EMULSION INTRAVENOUS at 09:25

## 2017-10-30 RX ADMIN — Medication 10 ML: at 11:42

## 2017-10-30 RX ADMIN — SODIUM CHLORIDE, SODIUM LACTATE, CALCIUM CHLORIDE, MAGNESIUM CHLORIDE AND DEXTROSE 2000 ML: 2.5; 538; 448; 18.3; 5.08 INJECTION, SOLUTION INTRAPERITONEAL at 23:23

## 2017-10-30 RX ADMIN — SODIUM CHLORIDE: 9 INJECTION, SOLUTION INTRAVENOUS at 09:00

## 2017-10-30 RX ADMIN — PROPOFOL 30 MG: 10 INJECTION, EMULSION INTRAVENOUS at 09:28

## 2017-10-30 RX ADMIN — SODIUM CHLORIDE, SODIUM LACTATE, CALCIUM CHLORIDE, MAGNESIUM CHLORIDE AND DEXTROSE 2000 ML: 2.5; 538; 448; 18.3; 5.08 INJECTION, SOLUTION INTRAPERITONEAL at 07:41

## 2017-10-30 RX ADMIN — Medication 10 ML: at 17:31

## 2017-10-30 RX ADMIN — MIDODRINE HYDROCHLORIDE 5 MG: 5 TABLET ORAL at 20:23

## 2017-10-30 RX ADMIN — PROPOFOL 30 MG: 10 INJECTION, EMULSION INTRAVENOUS at 09:31

## 2017-10-30 RX ADMIN — HYDROCODONE BITARTRATE AND ACETAMINOPHEN 1 TABLET: 5; 325 TABLET ORAL at 04:19

## 2017-10-30 RX ADMIN — SODIUM CHLORIDE, SODIUM LACTATE, CALCIUM CHLORIDE, MAGNESIUM CHLORIDE AND DEXTROSE 2000 ML: 2.5; 538; 448; 18.3; 5.08 INJECTION, SOLUTION INTRAPERITONEAL at 15:31

## 2017-10-30 RX ADMIN — SEVELAMER CARBONATE 1600 MG: 800 TABLET, FILM COATED ORAL at 21:08

## 2017-10-30 RX ADMIN — PROPOFOL 60 MG: 10 INJECTION, EMULSION INTRAVENOUS at 09:20

## 2017-10-30 RX ADMIN — APIXABAN 2.5 MG: 2.5 TABLET, FILM COATED ORAL at 21:08

## 2017-10-30 RX ADMIN — POTASSIUM CHLORIDE 10 MEQ: 10 INJECTION, SOLUTION INTRAVENOUS at 11:35

## 2017-10-30 RX ADMIN — PROPOFOL 20 MG: 10 INJECTION, EMULSION INTRAVENOUS at 09:48

## 2017-10-30 RX ADMIN — LEVETIRACETAM 1000 MG: 500 TABLET, FILM COATED ORAL at 21:08

## 2017-10-30 RX ADMIN — POTASSIUM CHLORIDE 10 MEQ: 10 INJECTION, SOLUTION INTRAVENOUS at 13:25

## 2017-10-30 RX ADMIN — PROPOFOL 30 MG: 10 INJECTION, EMULSION INTRAVENOUS at 09:50

## 2017-10-30 RX ADMIN — PROPOFOL 30 MG: 10 INJECTION, EMULSION INTRAVENOUS at 09:38

## 2017-10-30 RX ADMIN — POTASSIUM CHLORIDE 10 MEQ: 10 INJECTION, SOLUTION INTRAVENOUS at 14:35

## 2017-10-30 RX ADMIN — SODIUM THIOSULFATE: 250 INJECTION, SOLUTION INTRAVENOUS at 21:46

## 2017-10-30 RX ADMIN — PROPOFOL 40 MG: 10 INJECTION, EMULSION INTRAVENOUS at 09:34

## 2017-10-30 RX ADMIN — Medication 10 ML: at 21:09

## 2017-10-30 RX ADMIN — SEVELAMER CARBONATE 1600 MG: 800 TABLET, FILM COATED ORAL at 17:27

## 2017-10-30 RX ADMIN — FENTANYL CITRATE 50 MCG: 50 INJECTION, SOLUTION INTRAMUSCULAR; INTRAVENOUS at 09:17

## 2017-10-30 RX ADMIN — Medication 10 ML: at 05:00

## 2017-10-30 RX ADMIN — LIDOCAINE HYDROCHLORIDE 20 MG: 20 INJECTION, SOLUTION EPIDURAL; INFILTRATION; INTRACAUDAL; PERINEURAL at 09:20

## 2017-10-30 RX ADMIN — SODIUM CHLORIDE, SODIUM LACTATE, CALCIUM CHLORIDE, MAGNESIUM CHLORIDE AND DEXTROSE 2000 ML: 2.5; 538; 448; 18.3; 5.08 INJECTION, SOLUTION INTRAPERITONEAL at 19:24

## 2017-10-30 RX ADMIN — PROPOFOL 20 MG: 10 INJECTION, EMULSION INTRAVENOUS at 09:43

## 2017-10-30 RX ADMIN — SIMETHICONE 80 MG: 20 SUSPENSION/ DROPS ORAL at 09:35

## 2017-10-30 RX ADMIN — ONDANSETRON 4 MG: 2 INJECTION INTRAMUSCULAR; INTRAVENOUS at 22:39

## 2017-10-30 RX ADMIN — POTASSIUM CHLORIDE 10 MEQ: 10 INJECTION, SOLUTION INTRAVENOUS at 12:00

## 2017-10-30 RX ADMIN — PANTOPRAZOLE SODIUM 40 MG: 40 TABLET, DELAYED RELEASE ORAL at 17:27

## 2017-10-30 NOTE — PROGRESS NOTES
Hospitalist Progress Note  Kady Carmona MD  Answering service: [phone] OR [phone] from in house phone      Date of Service:  10/30/2017  NAME:  Margaret Russo  :  1980  MRN:  [id number]      Admission Summary:   43 yo woman with ESRD on PD s/p failed renal transplant, calciphylaxis, h/o DVT and bilateral PE on apixaban, seizure disorder, and HTN presented to the ED from home on 10/27/17 with dark red rectal bleeding since day PTA. Patient takes naproxen daily then started ibuprofen recently for pain. She was admitted for GI bleed.      Interval history / Subjective:   Seen after returning from EGD/colonoscopy; feels much better; denies nausea/vomiting, blood in stool; still c/o pain in right thigh and left hip wounds and now right thigh wound with foul odor     Assessment & Plan:     GI bleed (POA)  - likely UGIB due to NSAIDs  - GI following  - s/p EGD and colonoscopy Mon 10/30: GEJ erosion, erosive gastritis, mild erosive duodenitis, rectal ulcers, small internal hemorrhoid; pathology and H pylori pending  - continue PPI, avoid NSAIDs  - avoid NSAIDs  - hold apixaban    Acute blood loss anemia (POA)  - likely on anemia of CKD  - B12 low-normal and will increase home dose  - folate low and will supplement  - iron WNL  - s/p 2 units PRBC due to persistent rectal bleeding and Hb 7    [place] PD   [place] [facility] [place] for [place] orders  - EPO TIW started by Renal    H/o DVT and bilateral PE,    - apixaban on hold for colonoscopy on Mon 10/30; resume today    Calciphylaxis  - [place] [facility] [place] [facility]; currently on TIW sodium thiosulfate infusions via right chest wall Brodie catheter  - resume qMWF sodium thiosulfate infusion (1st dose Sat 10/28 due to missed dose on Fri 10/27)    Right medial thigh and left hip wounds (POA)   - likely due to calciphylaxis  - wound care consulted  - General Surgery consulted for deep right thigh wound: recommended nonsurgical management     Hypokalemia (POA) - replete judiciously prn  Hyperphosphatemia - continue phos binder    Chronic hypotension - continue midodrine    Seizure disorder - seizure precautions; continue AED    HTN - controlled on current meds    Morbid obesity, Body mass index is 47.11 kg/(m^2). Code status: Full  DVT prophylaxis: SCDs    Care Plan discussed with: Patient/Family and Nurse  Disposition: TBD. Normally cared for at [facility] Problems  Date Reviewed: 10/30/2017          Codes Class Noted POA    * (Principal)Lower GI bleed ICD-10-CM: K92.2  ICD-9-CM: 578.9  10/27/2017 Yes        ESRD on peritoneal dialysis [facility]) ICD-10-CM: N18.6, Z99.2  ICD-9-CM: 585.6, V45.11  6/29/2011 Unknown            Review of Systems:   Pertinent items are noted in HPI. Vital Signs:    Last 24hrs VS reviewed since prior progress note.  Most recent are:  Visit Vitals    BP 96/72 (BP 1 Location: Right arm, BP Patient Position: At rest)    Pulse 92    Temp 97.8 °F (36.6 °C)    Resp 16    Ht 5' 1\" (1.549 m)    Wt 113.1 kg (249 lb 5.4 oz)    SpO2 99%    BMI 47.11 kg/m2       Intake/Output Summary (Last 24 hours) at 10/30/17 0847  Last data filed at 10/30/17 9221   Gross per 24 hour   Intake             8000 ml   Output             7700 ml   Net              300 ml      Physical Examination:     Constitutional:  awake, no acute distress, cooperative, pleasant, obese   ENT:  oral mucosa moist, oropharynx benign  Neck supple, no masses   Resp:  CTA bilaterally, no wheezing/rhonchi/rales   CV:  regular rhythm, normal rate, no m/r/g appreciated, b/l LE edema, +pulses    GI:  +BS, soft, non distended, mildly tender, obese, +PD catheter    Musculoskeletal:  moves all extremities    Neurologic:  AAOx3, NFD     Skin:  left hip calciphylaxis lesion; malodorous deep right medial thigh wound  Eyes:  PERRL    Data Review:    Review and/or order of clinical lab test  Review and/or order of tests in the radiology section of CPT  Review and/or order of tests in the medicine section of CPT    Labs:     Recent Labs      10/30/17   0427  10/29/17   0333   WBC  6.9  7.2   HGB  8.8*  8.8*   HCT  27.4*  27.1*   PLT  310  315     Recent Labs      10/30/17   0427  10/29/17   0333  10/28/17   0336   NA  137  139  137   K  3.3*  3.4*  3.1*   CL  89*  89*  90*   CO2  19*  19*  20*   BUN  51*  55*  57*   CREA  12.90*  13.20*  13.10*   GLU  83  87  73   CA  8.3*  7.9*  7.7*   MG  1.6  1.6   --    PHOS  6.9*  7.7*   --      Recent Labs      10/30/17   0427  10/29/17   0333  10/27/17   1317   SGOT   --   21  33   ALT   --   34  48   AP   --   124*  149*   TBILI   --   0.6  1.0   TP   --   7.3  7.9   ALB  2.0*  1.9*  2.3*   GLOB   --   5.4*  5.6*     Recent Labs      10/27/17   1338   INR  1.2*   PTP  11.9*   APTT  28.5      Recent Labs      10/28/17   0336   TIBC  129*   PSAT  49   FERR  764*      Lab Results   Component Value Date/Time    Folate 4.2 10/28/2017 03:36 AM      No results for input(s): PH, PCO2, PO2 in the last 72 hours. No results for input(s): CPK, CKNDX, TROIQ in the last 72 hours.     No lab exists for component: CPKMB  Lab Results   Component Value Date/Time    Cholesterol, total 190 06/29/2011 03:20 AM    HDL Cholesterol 65 06/29/2011 03:20 AM    LDL, calculated 111.6 06/29/2011 03:20 AM    Triglyceride 67 06/29/2011 03:20 AM    CHOL/HDL Ratio 2.9 06/29/2011 03:20 AM     Lab Results   Component Value Date/Time    Glucose (POC) 102 08/09/2017 01:38 AM    Glucose (POC) 74 11/18/2010 11:21 PM     Lab Results   Component Value Date/Time    Color YELLOW 08/11/2012 06:55 AM    Appearance CLOUDY 08/11/2012 06:55 AM    Specific gravity 1.025 08/11/2012 06:55 AM    pH (UA) 6.0 08/11/2012 06:55 AM    Protein 100 08/11/2012 06:55 AM    Glucose NEGATIVE  08/11/2012 06:55 AM    Ketone NEGATIVE  08/11/2012 06:55 AM    Urobilinogen 0.2 08/11/2012 06:55 AM    Nitrites NEGATIVE  08/11/2012 06:55 AM    Leukocyte Esterase TRACE 08/11/2012 06:55 AM    Epithelial cells 20-50 08/11/2012 06:55 AM    Bacteria 4+ 08/11/2012 06:55 AM    WBC 20-50 08/11/2012 06:55 AM    RBC 5-10 08/11/2012 06:55 AM     Medications Reviewed:     Current Facility-Administered Medications   Medication Dose Route Frequency    potassium chloride 10 mEq in 100 ml IVPB  10 mEq IntraVENous Q1H    cyanocobalamin (VITAMIN B12) tablet 500 mcg  500 mcg Oral DAILY    folic acid (FOLVITE) tablet 1 mg  1 mg Oral DAILY    0.9% sodium chloride infusion 250 mL  250 mL IntraVENous PRN    sodium chloride (NS) flush 20 mL  20 mL InterCATHeter PRN    sodium chloride (NS) flush 10 mL  10 mL InterCATHeter Q24H    sodium chloride (NS) flush 10 mL  10 mL InterCATHeter PRN    sodium chloride (NS) flush 10 mL  10 mL InterCATHeter Q8H    alteplase (CATHFLO) 1 mg in sterile water (preservative free) 1 mL injection  1 mg InterCATHeter PRN    bacitracin 500 unit/gram packet 1 Packet  1 Packet Topical PRN    ondansetron (ZOFRAN) injection 4 mg  4 mg IntraVENous Q4H PRN    sodium thiosulfate 25 g in 100 mL infusion   IntraVENous DIALYSIS MON, WED & FRI    pantoprazole (PROTONIX) 40 mg in sodium chloride 0.9 % 10 mL injection  40 mg IntraVENous Q12H    prochlorperazine (COMPAZINE) with saline injection 10 mg  10 mg IntraVENous Q6H PRN    epoetin haylie (EPOGEN;PROCRIT) injection 8,000 Units  8,000 Units SubCUTAneous DIALYSIS TUE, THU & SAT    peritoneal dialysis DEXTROSE 2.5% (2.5 mEq/L low calcium) solution 2,000 mL  2,000 mL IntraPERitoneal QID    levETIRAcetam (KEPPRA) tablet 1,000 mg  1,000 mg Oral QHS    sevelamer carbonate (RENVELA) tab 1,600 mg  1,600 mg Oral TID    sodium chloride (NS) flush 5-10 mL  5-10 mL IntraVENous Q8H    sodium chloride (NS) flush 5-10 mL  5-10 mL IntraVENous PRN    HYDROcodone-acetaminophen (NORCO) 5-325 mg per tablet 1 Tab  1 Tab Oral Q4H PRN    morphine injection 5 mg  5 mg IntraVENous Q4H PRN    hydrocortisone (ANUSOL-HC) 2.5 % rectal cream PeriANAL BID    midodrine (PROAMITINE) tablet 5 mg  5 mg Oral Q MON, WED & FRI     ______________________________________________________________________  EXPECTED LENGTH OF STAY: - - -  ACTUAL LENGTH OF STAY:          [place]

## 2017-10-30 NOTE — PROGRESS NOTES
No complaints this AM. Ms. Raciel Blake reports no blood per rectum. Tm 97.9 HR: 94 BP: 100/76 Resp Rate: 18 per minute 100% sat on room air. Intake/Output Summary (Last 24 hours) at 10/30/17 0840  Last data filed at 10/30/17 6103   Gross per 24 hour   Intake             8000 ml   Output             7700 ml   Net              300 ml   Exam: Cor: RRR. Lungs: Bilateral breath sounds. Clear to auscultation. Abd: Soft. Non distended. Non tender. No rebound or guarding. Right Thigh: Open wound on medial aspect. Fibrinous debris at base of wound. Associated induration. Labs:   Recent Results (from the past 12 hour(s))   CBC WITH AUTOMATED DIFF    Collection Time: 10/30/17  4:27 AM   Result Value Ref Range    WBC 6.9 3.6 - 11.0 K/uL    RBC 3.17 (L) 3.80 - 5.20 M/uL    HGB 8.8 (L) 11.5 - 16.0 g/dL    HCT 27.4 (L) 35.0 - 47.0 %    MCV 86.4 80.0 - 99.0 FL    MCH 27.8 26.0 - 34.0 PG    MCHC 32.1 30.0 - 36.5 g/dL    RDW 18.0 (H) 11.5 - 14.5 %    PLATELET 527 636 - 989 K/uL    NEUTROPHILS 81 (H) 32 - 75 %    LYMPHOCYTES 12 12 - 49 %    MONOCYTES 6 5 - 13 %    EOSINOPHILS 1 0 - 7 %    BASOPHILS 0 0 - 1 %    ABS. NEUTROPHILS 5.6 1.8 - 8.0 K/UL    ABS. LYMPHOCYTES 0.8 0.8 - 3.5 K/UL    ABS. MONOCYTES 0.4 0.0 - 1.0 K/UL    ABS. EOSINOPHILS 0.1 0.0 - 0.4 K/UL    ABS.  BASOPHILS 0.0 0.0 - 0.1 K/UL   RENAL FUNCTION PANEL    Collection Time: 10/30/17  4:27 AM   Result Value Ref Range    Sodium 137 136 - 145 mmol/L    Potassium 3.3 (L) 3.5 - 5.1 mmol/L    Chloride 89 (L) 97 - 108 mmol/L    CO2 19 (L) 21 - 32 mmol/L    Anion gap 29 (H) 5 - 15 mmol/L    Glucose 83 65 - 100 mg/dL    BUN 51 (H) 6 - 20 MG/DL    Creatinine 12.90 (H) 0.55 - 1.02 MG/DL    BUN/Creatinine ratio 4 (L) 12 - 20      GFR est AA 4 (L) >60 ml/min/1.73m2    GFR est non-AA 3 (L) >60 ml/min/1.73m2    Calcium 8.3 (L) 8.5 - 10.1 MG/DL    Phosphorus 6.9 (H) 2.6 - 4.7 MG/DL    Albumin 2.0 (L) 3.5 - 5.0 g/dL   MAGNESIUM    Collection Time: 10/30/17  4:27 AM   Result Value Ref Range    Magnesium 1.6 1.6 - 2.4 mg/dL   Will change wound care to saline soaked micaela wet-to-dry BID. GI following - Colonoscopy today. NPO for now. HD per Nephrology. Plans per Dr. Hilda Deluca. Following.

## 2017-10-30 NOTE — ROUTINE PROCESS
Paged Dr. Beryl Sacks and informed him pt was unable to finish golytely but that she is having BM's. Orders to still go ahead & send her down when they call & he'll see how she is.

## 2017-10-30 NOTE — PROGRESS NOTES
K pad in room not functioning and the fluids for peritoneal dialysis are not warm enough to use. Called [employer] to order new K pad because this is the second time the k pad malfunctioned. Waiting for the new heating pad to be delivered.

## 2017-10-30 NOTE — PROGRESS NOTES
Physical Therapy Screening:  Services are not indicated at this time. An InBasket screening referral was triggered for physical therapy based on results obtained during the nursing admission assessment. The patients chart was reviewed and the patient is not appropriate for a skilled therapy evaluation at this time. Please consult physical therapy if any therapy needs arise. Thank you.     Mechelle Herrera, PT

## 2017-10-30 NOTE — PROGRESS NOTES
Andrea Garcia  1980  [id number]    Situation:    Scheduled Procedure: Procedure(s):  ESOPHAGOGASTRODUODENOSCOPY (EGD)  COLONOSCOPY  Verbal report received from: Marleen Bustos RN  Preoperative diagnosis: Abdominal pain    Background:    Procedure: Procedure(s):  ESOPHAGOGASTRODUODENOSCOPY (EGD)  COLONOSCOPY  Physician performing procedure; Dr. Disha De La Rosa RN    NPO Status/Last PO Intake: 10/29/17    Pregnancy Test:No If yes, result: none    Is the patient taking Blood Thinners: YES If yes, list: Eliquis and last taken 10/27/17  Is the patient diabetic:no       If yes, what was the last BS:    Time taken? Anything given? no           Does the patient have a Pacemaker/Defibrillator in place?: no   Does the patient need antibiotics before/during/after procedure: no   If the patient is having a colon, How much prep was drank? half   What were the Colon prep results? \"Runny brown\"   Does the patient have SCD in place:no   Is patient on CONTACT precautions:no        If yes, what kind of CONTACT precautions:     Assessment:  Are the vital signs stable prior to patient coming to ENDO?  yes  Is the patient alert/oriented and able to sign consent for the procedures:yes  How does the patient's abdomen feel prior to coming to ENDO? round and soft yes   Does the patient have a patient IV in place?  Yes       Recommendation:  Family or Friend present yes     Permission to share finding with Family or Friend no    Pt states she is not pregnant and cannot be pregnant due to no intercourse and  \"can't have kids\"  Made CRNA aware who verified meds given are fine if pregnant

## 2017-10-30 NOTE — ROUTINE PROCESS
TRANSFER - OUT REPORT:    Verbal report given to Tanika(name) on Cassia Faustin  being transferred to [facility](unit) for routine progression of care       Report consisted of patients Situation, Background, Assessment and   Recommendations(SBAR). Information from the following report(s) Procedure Summary and Recent Results was reviewed with the receiving nurse. Lines:   Double Lumen Brodie Catheter 10/28/17 Right Subclavian (Active)   Central Line Being Utilized Yes 10/30/2017  9:51 AM   Criteria for Appropriate Use Limited/no vessel suitable for conventional peripheral access 10/30/2017  9:51 AM   Site Assessment Clean, dry, & intact 10/30/2017  9:51 AM   Infiltration Assessment 0 10/30/2017  9:51 AM   Affected Extremity/Extremities Color distal to insertion site pink (or appropriate for race) 10/30/2017  4:20 AM   Date of Last Dressing Change 10/28/17 10/30/2017  9:51 AM   Dressing Status Clean, dry, & intact 10/30/2017  9:51 AM   Dressing Type Disk with Chlorhexadine gluconate (CHG); Transparent 10/30/2017  9:51 AM   Action Taken Blood drawn 10/30/2017  4:20 AM   Proximal Hub Color/Line Status Cap end changed; Capped;Flushed 10/30/2017  4:20 AM   Positive Blood Return (Medial Site) Yes 10/30/2017  4:20 AM   Distal Hub Color/Line Status Cap end changed; Capped;Flushed 10/30/2017  4:20 AM   Positive Blood Return (Lateral Site) Yes 10/30/2017  4:20 AM   Alcohol Cap Used Yes 10/30/2017  4:20 AM        Opportunity for questions and clarification was provided.       Patient transported with:   [employer]

## 2017-10-30 NOTE — ANESTHESIA POSTPROCEDURE EVALUATION
Post-Anesthesia Evaluation and Assessment    Patient: Michelle Higgins MRN: [id number]  SSN: [id number]    YOB: 1980  Age: 44 y.o. Sex: female       Cardiovascular Function/Vital Signs  Visit Vitals    BP 95/48    Pulse (!) 106    Temp 36.8 °C (98.2 °F)    Resp 27    Ht 5' 1\" (1.549 m)    Wt 113.1 kg (249 lb 5.4 oz)    SpO2 94%    BMI 47.11 kg/m2       Patient is status post MAC anesthesia for Procedure(s):  ESOPHAGOGASTRODUODENOSCOPY (EGD)  COLONOSCOPY  ESOPHAGOGASTRODUODENAL (EGD) BIOPSY  COLON BIOPSY. Nausea/Vomiting: None    Postoperative hydration reviewed and adequate. Pain:  Pain Scale 1: Numeric (0 - 10) (10/30/17 1008)  Pain Intensity 1: 0 (10/30/17 1008)   Managed    Neurological Status: At baseline    Mental Status and Level of Consciousness: Arousable    Pulmonary Status:   O2 Device: Nasal cannula;Oral airway (10/30/17 0955)   Adequate oxygenation and airway patent    Complications related to anesthesia: None    Post-anesthesia assessment completed.  No concerns    Signed By: Tania Ren MD     October 30, 2017

## 2017-10-30 NOTE — PROGRESS NOTES
Bedside shift change report given to [address] [place] [address] (oncoming nurse) by Lisandra Oglesby RN (offgoing nurse). Report included the following information SBAR, Kardex, Procedure Summary, Intake/Output, MAR and Recent Results.

## 2017-10-30 NOTE — WOUND CARE
WOCN Note:   New consult placed by MD for wound care; Chart review revealed:   Admitted for lower GI bleed; history of End-stage renal disease, seizure disorder,   she had calciphylaxis, she has morbid obesity, she had a DVT, as well   as pulmonary embolism twice. Surgery following Dr. Drea Palacio  Admitted from home;     Assessment:   Patient is alert, verbal;  Bed: total care  Diet: NPO  Patient denied pain prior to assessment but did experience discomfort with wound dressing removal and application that did resolve;   Bilateral heel, buttocks, and sacral skin intact and without erythema. Palpable DP pulses bilaterally. Bilateral lower extremities with edema right greater than left;     1. POA left hip wound consistent with calciphylaxis measured 7cm x 8.5cm x 0.1cm 40% soft necrotic eschar, 60% pink with scattered slough, small serous drainage, mild odor, wound edges open, periwound skin with induration; cleansed with normal saline, applied Santyl ointment, covered with gauze moist with normal saline, covered with dry gauze, secured with medipore tape;   2. POA right medial proximal thigh wound consistent with calciphylaxis measured 1.5cm x 6.5cm x 1cm 100% non-viable tissue, malodorous, moderate tan drainage, wound edges open, periwound skin with mild induration; cleansed with normal saline, applied Santyl ointment, covered with gauze moist with normal saline, covered with dry gauze, secured with medipore tape;   3. POA right medial distal wound consistent with calciphylaxis measured 3cm x 3cm x 0.1cm, 80% pink, 20% dry necrotic tissue; dry dressing applied; Patient repositioned without assist from supine to right and back to supine on total care bed with pillows to offload bony prominences; Heels offloaded on pillow.      Skin/wound treatment Recommendations:    Twice daily cleanse right thigh and left hip open wounds with normal saline, apply and fill with gauze moist with dakins 1/4 solution, cover with dry gauze, secure with medipore tape;     Skin Care & Pressure Injury Prevention Recommendations:  1. Minimize layers of linen/pads under patient to optimize support surface. 2.  Reposition patient approximately every 2 hours;  3. Float heels with pillow under calves. 4.  promote continence;  5. Continue on total care bed for pressure redistribution. 6.  Assess/protect skin in contact with medical devices. Keep skin moisturized;   7. Ensure that patient is repositioning in chair shifting weight approximately every 15 minutes and standing up every hour. Discussed above assessment and recommendations with Gera Jones (RN) and Miguel WEBSTER;     Transition of Care: Plan to follow weekly and as needed while admitted to hospital.      SHERRY Marroquin, RN, [facility]  Certified Wound, Ostomy, Continence Nurse  office [phone]  pager [phone]   [facility]

## 2017-10-30 NOTE — ROUTINE PROCESS
Lawerencember Cheung  1980  [id number]    Situation:  Verbal report received from: Perla Jim  Procedure: Procedure(s):  ESOPHAGOGASTRODUODENOSCOPY (EGD)  COLONOSCOPY  ESOPHAGOGASTRODUODENAL (EGD) BIOPSY  COLON BIOPSY    Background:    Preoperative diagnosis: Abdominal pain  Postoperative diagnosis: 1. antral errosion  2. esophagitis  3. rectal ulcers  4. solitary diverticulosis of the transverse colon    :  Dr. Yael Sauer  Assistant(s): Endoscopy Technician-1: Betty Mckeon  Endoscopy RN-1: Lisa Garcia RN    Specimens:   ID Type Source Tests Collected by Time Destination   1 : antral biopsy Preservative   Marianna Arizmendi MD 10/30/2017 1568 Pathology   2 : GE junction biopsy Preservative   Marianna Arizmendi MD 10/30/2017 3728 Pathology   3 : rectal ulcers biopsy Diannative   Marianna Arizmendi MD 10/30/2017 2946 Pathology     H. Pylori  no    Assessment:  Intra-procedure medications     Anesthesia gave intra-procedure sedation and medications, see anesthesia flow sheet yes    Intravenous fluids: NS@ KVO     Vital signs stable yes    Abdominal assessment: round and soft  Yes     Recommendation:  Discharge patient per MD order yes .   Return to floor na  Family or Friend fam  Permission to share finding with family or friend yes

## 2017-10-30 NOTE — ANESTHESIA PREPROCEDURE EVALUATION
Anesthetic History   No history of anesthetic complications            Review of Systems / Medical History  Patient summary reviewed, nursing notes reviewed and pertinent labs reviewed    Pulmonary  Within defined limits                 Neuro/Psych     seizures         Cardiovascular  Within defined limits                     GI/Hepatic/Renal         Renal disease: dialysis       Endo/Other  Within defined limits           Other Findings            Physical Exam    Airway  Mallampati: I  TM Distance: > 6 cm  Neck ROM: normal range of motion   Mouth opening: Normal     Cardiovascular  Regular rate and rhythm,  S1 and S2 normal,  no murmur, click, rub, or gallop             Dental  No notable dental hx       Pulmonary  Breath sounds clear to auscultation               Abdominal  GI exam deferred       Other Findings            Anesthetic Plan    ASA: 3  Anesthesia type: MAC          Induction: Intravenous  Anesthetic plan and risks discussed with: Patient

## 2017-10-30 NOTE — PROGRESS NOTES

## 2017-10-30 NOTE — PROCEDURES
EGD Procedure Note    Indications:  Melena/hematochezia   Referring Physician: Romie Carr MD   Anesthesia/Sedation:MAC  Endoscopist:  Dr. Leanor Schirmer  Assistant:  Endoscopy Technician-1: Elly Acosta  Endoscopy RN-1: Poppy Mcdonnell RN    Preoperative diagnosis: Abdominal pain    Postoperative diagnosis: 1. antral errosion  2. esophagitis  3. rectal ulcers  4. solitary diverticulum of the transverse colon      Procedure in Detail:  Informed consent was obtained for the procedure, including sedation. Risks of perforation, hemorrhage, adverse drug reaction, and aspiration were discussed. The patient was placed in the left lateral decubitus position. Based on the pre-procedure assessment, including review of the patient's medical history, medications, allergies, and review of systems, she had been deemed to be an appropriate candidate for moderate sedation; she was therefore sedated with the medications listed above. The patient was monitored continuously with ECG tracing, pulse oximetry, blood pressure monitoring, and direct observations. An Olympus video endoscope was inserted into the mouth and advanced under direct vision to into the esophagus, then stomach and duodenum. A careful inspection was made as the gastroscope was withdrawn, including a retroflexed view of the proximal stomach; findings and interventions are described below. Findings:   Esophagus:single narrow 1 cm linear erosion proximally extending from the GEJ  Stomach: erosive antritis - Pyloritek and path from the antrum; Pyloritek from the fundus  Duodenum/jejunum: chronic mild erosive duodenitis    Therapies:  See above    Specimens: see above           EBL: None    Complications:   None; patient tolerated the procedure well. Recommendations:  -Acid suppression with a proton pump inhibitor. , -Await pathology. , -Await IAN test result and treat for Helicobacter pylori if positive. , -No BRAXTON Connell MD                 Colonoscopy Procedure Note    Indications:   Hematochezia/melena  Referring Physician: Blas Burns MD   Anesthesia/Sedation:MAC  Endoscopist:  Dr. Catia Morales  Assistant:  Endoscopy Nitza Dose: Omayra Menard  Endoscopy RN-1: Stacie Heller RN    Preoperative diagnosis: Abdominal pain    Postoperative diagnosis: 1. antral errosion  2. esophagitis  3. rectal ulcers  4. solitary diverticulosis of the transverse colon      Procedure in Detail:  Informed consent was obtained for the procedure, including sedation. Risks of perforation, hemorrhage, adverse drug reaction, and aspiration were discussed. The patient was placed in the left lateral decubitus position. Based on the pre-procedure assessment, including review of the patient's medical history, medications, allergies, and review of systems, she had been deemed to be an appropriate candidate for moderate sedation; she was therefore sedated with the medications listed above. The patient was monitored continuously with ECG tracing, pulse oximetry, blood pressure monitoring, and direct observations. A rectal examination was performed. The [id number] was inserted into the rectum and advanced under direct vision to the cecum, which was identified by the ileocecal valve and appendiceal orifice. The quality of the colonic preparation was excellent. A careful inspection was made as the colonoscope was withdrawn, including a retroflexed view of the rectum; findings and interventions are described below.       Findings:   Rectum: several proximal serpiginous and circular ulcers with old clot - Bx, small internal hemorrhoid Grade 1     Sigmoid: normal  Descending Colon: normal  Transverse Colon: solitary small diverticulum  Ascending Colon: normal  Cecum: normal  Terminal Ileum: not intubated - unable to enter     Specimens:     see above    EBL: None    Complications: None; patient tolerated the procedure well. Recommendations:     - Await pathology.     - Bleeding due to rectal ulcers, maybe some bleeding form UGI erosions while taking Eliquis    - Cause of rectal ulcers possibly stercoral due to constipation vs NSAID induced like her UGI lesions vs IBD    - CRP, no NSAIDs    Signed By: Rafa Perez MD                        October 30, 2017

## 2017-10-31 VITALS
BODY MASS INDEX: 48.07 KG/M2 | TEMPERATURE: 98.1 F | WEIGHT: 254.63 LBS | DIASTOLIC BLOOD PRESSURE: 68 MMHG | HEART RATE: 101 BPM | OXYGEN SATURATION: 94 % | RESPIRATION RATE: 18 BRPM | SYSTOLIC BLOOD PRESSURE: 97 MMHG | HEIGHT: 61 IN

## 2017-10-31 LAB
ABO + RH BLD: NORMAL
ALBUMIN SERPL-MCNC: 1.8 G/DL (ref 3.5–5)
ANION GAP SERPL CALC-SCNC: 35 MMOL/L (ref 5–15)
ANTIGENS PRESENT BLD: NORMAL
ANTIGENS PRESENT RBC DONR: NORMAL
BASOPHILS # BLD: 0 K/UL (ref 0–0.1)
BASOPHILS NFR BLD: 0 % (ref 0–1)
BLD PROD TYP BPU: NORMAL
BLOOD BANK CMNT PATIENT-IMP: NORMAL
BLOOD GROUP ANTIBODIES SERPL: NORMAL
BLOOD GROUP ANTIBODIES SERPL: NORMAL
BPU ID: NORMAL
BUN SERPL-MCNC: 49 MG/DL (ref 6–20)
BUN/CREAT SERPL: 4 (ref 12–20)
CALCIUM SERPL-MCNC: 8.1 MG/DL (ref 8.5–10.1)
CHLORIDE SERPL-SCNC: 89 MMOL/L (ref 97–108)
CO2 SERPL-SCNC: 18 MMOL/L (ref 21–32)
CREAT SERPL-MCNC: 12.7 MG/DL (ref 0.55–1.02)
CROSSMATCH RESULT,%XM: NORMAL
DIFFERENTIAL METHOD BLD: ABNORMAL
EOSINOPHIL # BLD: 0.1 K/UL (ref 0–0.4)
EOSINOPHIL NFR BLD: 1 % (ref 0–7)
ERYTHROCYTE [DISTWIDTH] IN BLOOD BY AUTOMATED COUNT: 18 % (ref 11.5–14.5)
GLUCOSE SERPL-MCNC: 90 MG/DL (ref 65–100)
HCT VFR BLD AUTO: 26.1 % (ref 35–47)
HGB BLD-MCNC: 8.4 G/DL (ref 11.5–16)
LYMPHOCYTES # BLD: 0.7 K/UL (ref 0.8–3.5)
LYMPHOCYTES NFR BLD: 10 % (ref 12–49)
MAGNESIUM SERPL-MCNC: 1.7 MG/DL (ref 1.6–2.4)
MCH RBC QN AUTO: 27.7 PG (ref 26–34)
MCHC RBC AUTO-ENTMCNC: 32.2 G/DL (ref 30–36.5)
MCV RBC AUTO: 86.1 FL (ref 80–99)
METAMYELOCYTES NFR BLD MANUAL: 1 %
MONOCYTES # BLD: 0.6 K/UL (ref 0–1)
MONOCYTES NFR BLD: 8 % (ref 5–13)
NEUTS BAND NFR BLD MANUAL: 1 %
NEUTS SEG # BLD: 5.6 K/UL (ref 1.8–8)
NEUTS SEG NFR BLD: 79 % (ref 32–75)
PHOSPHATE SERPL-MCNC: 6.5 MG/DL (ref 2.6–4.7)
PLATELET # BLD AUTO: 309 K/UL (ref 150–400)
POTASSIUM SERPL-SCNC: 3.5 MMOL/L (ref 3.5–5.1)
RBC # BLD AUTO: 3.03 M/UL (ref 3.8–5.2)
RBC MORPH BLD: ABNORMAL
SODIUM SERPL-SCNC: 142 MMOL/L (ref 136–145)
SPECIMEN EXP DATE BLD: NORMAL
STATUS OF UNIT,%ST: NORMAL
UNIT DIVISION, %UDIV: 0
WBC # BLD AUTO: 7 K/UL (ref 3.6–11)

## 2017-10-31 PROCEDURE — 85025 COMPLETE CBC W/AUTO DIFF WBC: CPT | Performed by: INTERNAL MEDICINE

## 2017-10-31 PROCEDURE — A4722 DIALYS SOL FLD VOL > 1999CC: HCPCS | Performed by: INTERNAL MEDICINE

## 2017-10-31 PROCEDURE — 74011000250 HC RX REV CODE- 250: Performed by: PHYSICIAN ASSISTANT

## 2017-10-31 PROCEDURE — 74011250637 HC RX REV CODE- 250/637: Performed by: INTERNAL MEDICINE

## 2017-10-31 PROCEDURE — 80069 RENAL FUNCTION PANEL: CPT | Performed by: INTERNAL MEDICINE

## 2017-10-31 PROCEDURE — 36415 COLL VENOUS BLD VENIPUNCTURE: CPT | Performed by: INTERNAL MEDICINE

## 2017-10-31 PROCEDURE — 83735 ASSAY OF MAGNESIUM: CPT | Performed by: INTERNAL MEDICINE

## 2017-10-31 PROCEDURE — 74011250636 HC RX REV CODE- 250/636: Performed by: INTERNAL MEDICINE

## 2017-10-31 RX ORDER — HYDROCODONE BITARTRATE AND ACETAMINOPHEN 5; 325 MG/1; MG/1
1 TABLET ORAL
Qty: 20 TAB | Refills: 0 | Status: SHIPPED | OUTPATIENT
Start: 2017-10-31

## 2017-10-31 RX ORDER — PANTOPRAZOLE SODIUM 40 MG/1
TABLET, DELAYED RELEASE ORAL
Qty: 60 TAB | Refills: 2 | Status: SHIPPED | OUTPATIENT
Start: 2017-10-31

## 2017-10-31 RX ORDER — FOLIC ACID 1 MG/1
1 TABLET ORAL DAILY
Qty: 100 TAB | Refills: 0 | Status: SHIPPED | OUTPATIENT
Start: 2017-11-01

## 2017-10-31 RX ADMIN — FOLIC ACID 1 MG: 1 TABLET ORAL at 08:50

## 2017-10-31 RX ADMIN — PANTOPRAZOLE SODIUM 40 MG: 40 TABLET, DELAYED RELEASE ORAL at 06:15

## 2017-10-31 RX ADMIN — Medication 10 ML: at 06:48

## 2017-10-31 RX ADMIN — Medication 500 MCG: at 08:49

## 2017-10-31 RX ADMIN — SEVELAMER CARBONATE 1600 MG: 800 TABLET, FILM COATED ORAL at 08:50

## 2017-10-31 RX ADMIN — SODIUM CHLORIDE, SODIUM LACTATE, CALCIUM CHLORIDE, MAGNESIUM CHLORIDE AND DEXTROSE 2000 ML: 2.5; 538; 448; 18.3; 5.08 INJECTION, SOLUTION INTRAPERITONEAL at 08:50

## 2017-10-31 RX ADMIN — APIXABAN 2.5 MG: 2.5 TABLET, FILM COATED ORAL at 08:49

## 2017-10-31 RX ADMIN — HYDROCODONE BITARTRATE AND ACETAMINOPHEN 1 TABLET: 5; 325 TABLET ORAL at 06:15

## 2017-10-31 RX ADMIN — Medication 10 ML: at 08:52

## 2017-10-31 RX ADMIN — DAKIN'S SOLUTION 0.125% (QUARTER STRENGTH): 0.12 SOLUTION at 06:47

## 2017-10-31 NOTE — DISCHARGE SUMMARY
Discharge Summary     Patient:  Ciera Graf       MRN: [id number]       YOB: 1980       Age: 44 y.o. Date of admission:  10/27/2017    Date of discharge:  10/31/2017    Primary care provider: Dr. Ciarra Sanches MD    Admitting provider:  Fercho Stinson MD    Discharging provider:  Dory [address] [phone]. If unavailable, call [phone] and ask the  to page the triage hospitalist.    Consultations  IP CONSULT TO GASTROENTEROLOGY  IP CONSULT TO ANESTHESIOLOGY  IP CONSULT TO HOSPITALIST  IP CONSULT TO GENERAL SURGERY    Procedures  Procedure(s):  ESOPHAGOGASTRODUODENOSCOPY (EGD)  COLONOSCOPY  ESOPHAGOGASTRODUODENAL (EGD) BIOPSY  COLON BIOPSY    Discharge destination: Home. The patient is stable for discharge. Admission diagnosis  Lower GI bleed  ESRD on peritoneal dialysis ([facility] [address]  poor venous access  Abdominal pain    Current Discharge Medication List      START taking these medications    Details   folic acid (FOLVITE) 1 mg tablet Take 1 Tab by mouth daily. Can be obtained over-the-counter  Qty: 100 Tab, Refills: 0      HYDROcodone-acetaminophen (NORCO) 5-325 mg per tablet Take 1 Tab by mouth every four (4) hours as needed. Max Daily Amount: 6 Tabs. For Pain. Qty: 20 Tab, Refills: 0      sodium hypochlorite (QUARTER STRENGTH DAKIN'S) 0.125 % soln external solution Twice daily cleanse right thigh and left hip open wounds with normal saline, apply and fill with gauze moist with dakins 1/4 solution, cover with dry gauze, secure with medipore tape;  Qty: 473 mL, Refills: 0      pantoprazole (PROTONIX) 40 mg tablet Please take 1 tablet twice daily before breakfast and dinner for 1 month.  After 1 month, please reduce frequency to once daily before breakfast.  Qty: 60 Tab, Refills: 2         CONTINUE these medications which have NOT CHANGED    Details apixaban (ELIQUIS) 2.5 mg tablet Take 2.5 mg by mouth two (2) times a day. ondansetron (ZOFRAN ODT) 4 mg disintegrating tablet Take 4 mg by mouth every eight (8) hours as needed for Nausea. clindamycin (CLEOCIN) 300 mg capsule Take 300 mg by mouth four (4) times daily. Started 10/26, 14 day course, for infected calciphylaxis wounds      sodium thiosulfate infusion 25 mg by IntraVENous route DIALYSIS MON, WED & FRI. Patient is on PD only, gets this at [address]      midodrine (PROAMITINE) 5 mg tablet Take 5 mg by mouth every Monday, Wednesday, Friday. Given with IV sodium thiosulfate   Indications: Symptomatic Orthostatic Hypotension      peritoneal dialysis with additives by IntraPERitoneal route two (2) times a day. diazePAM (VALIUM) 5 mg tablet Take 5 mg by mouth every twelve (12) hours as needed for Anxiety. calcium carbonate (TUMS) 200 mg calcium (500 mg) chew Take 1 Tab by mouth four (4) times daily. Qty: 120 Tab, Refills: 1      levETIRAcetam (KEPPRA) 1,000 mg tablet Take 1,000 mg by mouth nightly.      ergocalciferol (VITAMIN D2) 50,000 unit capsule Take 50,000 Units by mouth daily. cyanocobalamin (VITAMIN B-12) 500 mcg tablet Take 250 mcg by mouth daily. sevelamer carbonate (RENVELA) 800 mg tab tab Take 1,600 mg by mouth three (3) times daily. potassium chloride (K-DUR, KLOR-CON) 20 mEq tablet Take 20 mEq by mouth daily. STOP taking these medications       famotidine (PEPCID AC) 20 mg tablet Comments:   Reason for Stopping:                Follow-up Information     Follow up With Details Comments [address] [place] [address] Isabel Manrique MD Call As needed for primary care [address]  [phone]      [facility] wound care clinic Go on 11/2/2017 Appointment at 9:00 AM [address]  [address] [facility], 3rd Floor  Phone [phone]  Fax [phone]          Final discharge diagnoses and brief hospital course  Please also refer to the admission H&P for details on the presenting problem. 45 yo woman with ESRD on PD s/p failed renal transplant, calciphylaxis, h/o DVT and bilateral PE on apixaban, seizure disorder, and HTN presented to the ED from home on 10/27/17 with dark red rectal bleeding since day PTA. Patient takes naproxen daily then started ibuprofen recently for pain. She was admitted for GI bleed. GI bleed (POA) - UGIB due to NSAIDs, resolved  - s/p EGD and colonoscopy Mon 10/30: GEJ erosion, erosive gastritis, mild erosive duodenitis, rectal ulcers, small internal hemorrhoid; pathology and H pylori pending  - continue PPI  - avoid NSAIDs  - Restarted apixaban  - GI consulted     Acute blood loss anemia (POA) - likely on anemia of CKD, Hgb stable  - B12 low-normal and will increase home dose  - folate low and will supplement  - iron WNL  - s/p 2 units PRBC due to persistent rectal bleeding and Hb 7     ESRD on PD   - [place] [facility] Renal consulted here for PD orders  - EPO TIW started by Renal     H/o DVT and bilateral PE, 2012   - apixaban on hold for colonoscopy on Mon 10/30; resumed     Calciphylaxis  - sees [facility]; currently on TIW sodium thiosulfate infusions via right chest wall Brodie catheter  - resume qMWF sodium thiosulfate infusion (1st dose Sat 10/28 due to missed dose on Fri 10/27)     Right medial thigh and left hip wounds (POA) - likely due to calciphylaxis  - wound care consulted - recommended BID dressing changes with Dakins  - General Surgery consulted for deep right thigh wound: recommended nonsurgical management   - Follow up with [facility] wound care clinic     Hypokalemia (POA) - replete judiciously prn  Hyperphosphatemia - continue phos binder     Chronic hypotension - continue midodrine     Seizure disorder - seizure precautions; continue keppra     HTN - controlled on current meds     Morbid obesity, Body mass index is 47.11 kg/(m^2).     FOLLOW-UP CARE RECOMMENDATIONS:    SYMPTOMS to watch for: chest pain, shortness of breath, nausea, vomiting, weakness, bleeding, black/tarry stools. DIET/what to eat:  Renal Diet    ACTIVITY:  Activity as tolerated    WOUND care instructions:  Twice daily cleanse right thigh and left hip open wounds with normal saline, apply and fill with gauze moist with dakins 1/4 solution, cover with dry gauze, secure with medipore tape;    OTHER medication instructions:  Please do not take any NSAID medications (motrin, ibuprofen, aspirin, naprosyn, etc.). If you have pain or fevers, it is fine to take acetaminophen (Tyelenol) as directed. What to do if new or unexpected symptoms occur? If you experience any of the above symptoms (or should other concerns or questions arise after discharge) please call your primary care physician. Return to the emergency room if you cannot get hold of your doctor. · It is very important that you keep your follow-up appointment(s). · Please bring discharge papers, medication list (and/or medication bottles) to your follow-up appointments for review by your outpatient provider(s). · Please check the list of medications and be sure it includes every medication (even non-prescription medications) that your provider wants you to take. · It is important that you take the medication exactly as they are prescribed. · Keep your medication in the bottles provided by the pharmacist and keep a list of the medication names, dosages, and times to be taken in your wallet. · Do not take other medications without consulting your doctor.    · If you have any questions about your medications or other instructions, please talk to your nurse or care provider before you leave the hospital.    Physical examination at discharge  Visit Vitals    BP 97/68    Pulse (!) 101    Temp 98.1 °F (36.7 °C)    Resp 18    Ht 5' 1\" (1.549 m)    Wt 115.5 kg (254 lb 10.1 oz)    SpO2 94%    BMI 48.11 kg/m2       Constitutional:  awake, no acute distress, cooperative, pleasant, obese   ENT:  oral mucosa moist, oropharynx benign  Neck supple, no masses   Resp:  CTA bilaterally, no wheezing/rhonchi/rales   CV:  regular rhythm, normal rate, no m/r/g appreciated, b/l LE edema, +pulses    GI:  +BS, soft, non distended, mildly tender, obese, +PD catheter    Musculoskeletal:  moves all extremities    Neurologic:  AAOx3, NFD                                             Skin:  left hip calciphylaxis lesion; malodorous deep right medial thigh wound  Eyes:  PERRL    Pertinent imaging studies:    CT abdomen/pelvis 10/27/17  FINDINGS:  LUNG: The visualized portions of the lung bases are clear. The absence of intravenous contrast material reduces the sensitivity for  evaluation of the solid parenchymal organs of the abdomen. LIVER: Borderline liver is minimally lobulated. GALLBLADDER: Absent  SPLEEN: No focal lesion. PANCREAS: No focal lesion. ADRENALS: No focal lesion. KIDNEYS: Native kidneys are bilaterally atrophic similar to the previous exam.  Renal transplant is again noted in the right iliac fossa. GI: No obstruction or inflammatory change is identified. .  APPENDIX: Unremarkable. AORTA: The aorta tapers without aneurysm. RETROPERITONEUM:  There is no retroperitoneal adenopathy or mass. Vena cava  filter noted. PERITONEUM: Ascites is seen throughout the abdomen. Peritoneal dialysis catheter  noted in the left lower quadrant. [place] BLADDER: Nearly empty  PELVIS: The rounded fluid collection in the lower pelvic abdominal wall and  adjacent pelvis is again seen unchanged. This is consistent with a lymphocele or  seroma. .   BONES: No sclerotic or lytic lesions noted. IMPRESSION:    1. Generalized ascites consistent with peritoneal dialysis. 2. Lymphocele or seroma in the right pelvis extending into the right inguinal  region again noted. This has not changed.   3. No evidence of bowel obstruction or other bowel abnormality within limits of  unenhanced exam.    CXR 10/27/17  FINDINGS: Single AP portable view of the chest obtained at 905 demonstrates a  stable cardiomediastinal silhouette. There is a right-sided central venous  catheter, tip at the cavoatrial junction. There is no pneumothorax. The lungs  are clear bilaterally. There is no pulmonary edema. No osseous abnormalities are  seen. IMPRESSION: Right-sided central venous catheter; no pneumothorax. No evidence of  acute cardiopulmonary process. Recent Labs      10/31/17   0413  10/30/17   0427  10/29/17   0333   WBC  7.0  6.9  7.2   HGB  8.4*  8.8*  8.8*   HCT  26.1*  27.4*  27.1*   PLT  309  310  315     Recent Labs      10/31/17   0413  10/30/17   0427  10/29/17   0333   NA  142  137  139   K  3.5  3.3*  3.4*   CL  89*  89*  89*   CO2  18*  19*  19*   BUN  49*  51*  55*   CREA  12.70*  12.90*  13.20*   GLU  90  83  87   CA  8.1*  8.3*  7.9*   MG  1.7  1.6  1.6   PHOS  6.5*  6.9*  7.7*     Recent Labs      10/31/17   0413  10/30/17   0427  10/29/17   0333   SGOT   --    --   21   AP   --    --   124*   TP   --    --   7.3   ALB  1.8*  2.0*  1.9*   GLOB   --    --   5.4*     No results for input(s): INR, PTP, APTT in the last 72 hours. No lab exists for component: INREXT, INREXT   No results for input(s): FE, TIBC, PSAT, FERR in the last 72 hours. No results for input(s): PH, PCO2, PO2 in the last 72 hours. No results for input(s): CPK, CKMB in the last 72 hours.     No lab exists for component: TROPONINI  No components found for: [place]    Chronic Diagnoses:    Problem List as of 10/31/2017  Date Reviewed: 10/30/2017          Codes Class Noted - Resolved    Calciphylaxis cutis ICD-10-CM: E83.59  ICD-9-CM: 275.49  10/27/2017 - Present        * (Principal)Lower GI bleed ICD-10-CM: K92.2  ICD-9-CM: 578.9  10/27/2017 - Present        Leukocytosis ICD-10-CM: D72.829  ICD-9-CM: 288.60  8/9/2017 - Present        Shock ([address] ICD-10-CM: R57.9  ICD-9-CM: 785.50  8/9/2017 - Present        Syncope ICD-10-CM: R55  ICD-9-CM: 780.2  8/9/2017 - Present        Tachycardia ICD-10-CM: R00.0  ICD-9-CM: 785.0  8/9/2017 - Present        Hypotension ICD-10-CM: I95.9  ICD-9-CM: 458.9  8/9/2017 - Present        ESRD (end stage renal disease) ([facility] [address] ICD-10-CM: N18.6  ICD-9-CM: 585.6  8/9/2017 - Present        Bilateral hip pain ICD-10-CM: M25.551, M25.552  ICD-9-CM: 719.45  8/9/2017 - Present        Hypocalcemia ICD-10-CM: E83.51  ICD-9-CM: 275.41  5/8/2017 - Present        UTI (lower urinary tract infection) ICD-10-CM: N39.0  ICD-9-CM: 599.0  8/11/2012 - Present        Hypotension, unspecified ICD-10-CM: I95.9  ICD-9-CM: 458.9  8/11/2012 - Present        ESRD on peritoneal dialysis [facility]) ICD-10-CM: N18.6, Z99.2  ICD-9-CM: 585.6, V45.11  6/29/2011 - Present        Chest pain, unspecified ICD-10-CM: R07.9  ICD-9-CM: 786.50  6/28/2011 - Present              Time spent on discharge related activities today greater than 30 minutes.       Signed:  Lawrence Castillo MD                 Hospitalist, Internal Medicine      Cc: Ruthie Moore MD     Fax to [facility] Emergency Department [phone]

## 2017-10-31 NOTE — PROGRESS NOTES
10/31/17 0846   Vital Signs   Temp 98.1 °F (36.7 °C)   Temp Source Oral   Pulse (Heart Rate) (!) 101   Resp Rate 18   O2 Sat (%) 94 %   Level of Consciousness Alert   BP 97/68   MAP (Calculated) 78   MEWS Score 3

## 2017-10-31 NOTE — DISCHARGE INSTRUCTIONS
Please bring this form with you to show your primary care provider at your follow-up appointment. Primary care provider:  Dr. Rhys Mora., MD    Discharging provider:  Sammi Catalan MD    You have been admitted to the hospital with the following diagnoses:  · GI bleed  · ESRD on peritoneal dialysis [facility])    FOLLOW-UP CARE RECOMMENDATIONS:    APPOINTMENTS:  Follow-up Information     Follow up With Details Comments [facility] MD Laina Call As needed for primary care [address]  [phone]      [facility] wound care clinic Go on 11/2/2017 Appointment at 9:00 AM [address]  [address] [facility] 3rd Floor  Phone [phone]  Fax [phone]           SYMPTOMS to watch for: chest pain, shortness of breath, nausea, vomiting, weakness, bleeding, black/tarry stools. DIET/what to eat:  Renal Diet    ACTIVITY:  Activity as tolerated    WOUND care instructions:  Twice daily cleanse right thigh and left hip open wounds with normal saline, apply and fill with gauze moist with dakins 1/4 solution, cover with dry gauze, secure with medipore tape;    OTHER medication instructions:  Please do not take any NSAID medications (motrin, ibuprofen, aspirin, naprosyn, etc.). If you have pain or fevers, it is fine to take acetaminophen (Tyelenol) as directed. What to do if new or unexpected symptoms occur? If you experience any of the above symptoms (or should other concerns or questions arise after discharge) please call your primary care physician. Return to the emergency room if you cannot get hold of your doctor. · It is very important that you keep your follow-up appointment(s). · Please bring discharge papers, medication list (and/or medication bottles) to your follow-up appointments for review by your outpatient provider(s).   · Please check the list of medications and be sure it includes every medication (even non-prescription medications) that your provider wants you to take. · It is important that you take the medication exactly as they are prescribed. · Keep your medication in the bottles provided by the pharmacist and keep a list of the medication names, dosages, and times to be taken in your wallet. · Do not take other medications without consulting your doctor. · If you have any questions about your medications or other instructions, please talk to your nurse or care provider before you leave the hospital.    I understand that if any problems occur once I am at home I am to contact my physician. These instructions were explained to me and I had the opportunity to ask questions.

## 2017-10-31 NOTE — PROGRESS NOTES
10/31/17 0845   Vital Signs   Temp 98.1 °F (36.7 °C)   Temp Source Oral   Pulse (Heart Rate) (!) 108   Resp Rate 18   O2 Sat (%) 95 %   Level of Consciousness Alert   BP (!) 89/66   MAP (Calculated) 74   MEWS Score 3

## 2017-10-31 NOTE — PROGRESS NOTES
NAME: Samantha Martínez        :  1980        MRN:  [id number]        Assessment :    Plan:  --APFS-ZTKB-[facility]-MCV  Anemia  GI bleed  Hypokalemia  Calciphylaxis  Chronic hypotension>>on midodrine --Continue with current PD. Tolerating well  Endoscopy showing antral erosion, esophagitis, rectal ulcers>>ct PPI    Replete KCL    Continue phosphate binders    Continue EPO. On Na thiosulfate 3x/week    OK for d/c from renal standpoint. If d/c, she will resume her cycler PD regimen at home. Subjective:     Chief Complaint:  Feels OK. No new c/o. Anxious to go home    Review of Systems:  Denies n/v/cp or sob    Objective:     VITALS:   Last 24hrs VS reviewed since prior progress note. Most recent are:  Visit Vitals    BP 97/68    Pulse (!) 101    Temp 98.1 °F (36.7 °C)    Resp 18    Ht 5' 1\" (1.549 m)    Wt 115.5 kg (254 lb 10.1 oz)    SpO2 94%    BMI 48.11 kg/m2       Intake/Output Summary (Last 24 hours) at 10/31/17 1120  Last data filed at 10/31/17 0914   Gross per 24 hour   Intake             8100 ml   Output             9850 ml   Net            -1750 ml      Telemetry Reviewed:     PHYSICAL EXAM:  General: NAD  RRR  CTA  tr edema      Lab Data Reviewed: (see below)    Medications Reviewed: (see below)    PMH/SH reviewed - no change compared to H&P  ________________________________________________________________________  Care Plan discussed with:  Patient y    Family  y    RN     Care Manager                    Consultant:          Comments   >50% of visit spent in counseling and coordination of care       ________________________________________________________________________  Annalisa Booth MD     Procedures: see electronic medical records for all procedures/Xrays and details which  were not copied into this note but were reviewed prior to creation of Plan.       LABS:  Recent Labs      10/31/17   0418 10/30/17   0427   WBC  7.0  6.9   HGB  8.4*  8.8*   HCT  26.1*  27.4*   PLT  309  310     Recent Labs      10/31/17   0413  10/30/17   0427  10/29/17   0333   NA  142  137  139   K  3.5  3.3*  3.4*   CL  89*  89*  89*   CO2  18*  19*  19*   BUN  49*  51*  55*   CREA  12.70*  12.90*  13.20*   GLU  90  83  87   CA  8.1*  8.3*  7.9*   MG  1.7  1.6  1.6   PHOS  6.5*  6.9*  7.7*     Recent Labs      10/31/17   0413  10/30/17   0427  10/29/17   0333   SGOT   --    --   21   AP   --    --   124*   TP   --    --   7.3   ALB  1.8*  2.0*  1.9*   GLOB   --    --   5.4*     No results for input(s): INR, PTP, APTT in the last 72 hours. No lab exists for component: INREXT, INREXT   No results for input(s): FE, TIBC, PSAT, FERR in the last 72 hours. Lab Results   Component Value Date/Time    Folate 4.2 10/28/2017 03:36 AM      No results for input(s): PH, PCO2, PO2 in the last 72 hours. No results for input(s): CPK, CKMB in the last 72 hours.     No lab exists for component: TROPONINI  No components found for: [place]  Lab Results   Component Value Date/Time    Color YELLOW 08/11/2012 06:55 AM    Appearance CLOUDY 08/11/2012 06:55 AM    Specific gravity 1.025 08/11/2012 06:55 AM    pH (UA) 6.0 08/11/2012 06:55 AM    Protein 100 08/11/2012 06:55 AM    Glucose NEGATIVE  08/11/2012 06:55 AM    Ketone NEGATIVE  08/11/2012 06:55 AM    Urobilinogen 0.2 08/11/2012 06:55 AM    Nitrites NEGATIVE  08/11/2012 06:55 AM    Leukocyte Esterase TRACE 08/11/2012 06:55 AM    Epithelial cells 20-50 08/11/2012 06:55 AM    Bacteria 4+ 08/11/2012 06:55 AM    WBC 20-50 08/11/2012 06:55 AM    RBC 5-10 08/11/2012 06:55 AM       MEDICATIONS:  Current Facility-Administered Medications   Medication Dose Route Frequency    sodium hypochlorite (QUARTER STRENGTH DAKIN'S) 0.125% irrigation (bottle)   Topical BID    apixaban (ELIQUIS) tablet 2.5 mg  2.5 mg Oral BID    pantoprazole (PROTONIX) tablet 40 mg  40 mg Oral ACB&D    cyanocobalamin (VITAMIN B12) tablet 500 mcg  500 mcg Oral DAILY    folic acid (FOLVITE) tablet 1 mg  1 mg Oral DAILY    0.9% sodium chloride infusion 250 mL  250 mL IntraVENous PRN    sodium chloride (NS) flush 20 mL  20 mL InterCATHeter PRN    sodium chloride (NS) flush 10 mL  10 mL InterCATHeter Q24H    sodium chloride (NS) flush 10 mL  10 mL InterCATHeter PRN    sodium chloride (NS) flush 10 mL  10 mL InterCATHeter Q8H    alteplase (CATHFLO) 1 mg in sterile water (preservative free) 1 mL injection  1 mg InterCATHeter PRN    bacitracin 500 unit/gram packet 1 Packet  1 Packet Topical PRN    ondansetron (ZOFRAN) injection 4 mg  4 mg IntraVENous Q4H PRN    sodium thiosulfate 25 g in 100 mL infusion   IntraVENous DIALYSIS MON, WED & FRI    prochlorperazine (COMPAZINE) with saline injection 10 mg  10 mg IntraVENous Q6H PRN    epoetin haylie (EPOGEN;PROCRIT) injection 8,000 Units  8,000 Units SubCUTAneous DIALYSIS TUE, THU & SAT    peritoneal dialysis DEXTROSE 2.5% (2.5 mEq/L low calcium) solution 2,000 mL  2,000 mL IntraPERitoneal QID    levETIRAcetam (KEPPRA) tablet 1,000 mg  1,000 mg Oral QHS    sevelamer carbonate (RENVELA) tab 1,600 mg  1,600 mg Oral TID    sodium chloride (NS) flush 5-10 mL  5-10 mL IntraVENous Q8H    sodium chloride (NS) flush 5-10 mL  5-10 mL IntraVENous PRN    HYDROcodone-acetaminophen (NORCO) 5-325 mg per tablet 1 Tab  1 Tab Oral Q4H PRN    morphine injection 5 mg  5 mg IntraVENous Q4H PRN    hydrocortisone (ANUSOL-HC) 2.5 % rectal cream   PeriANAL BID    midodrine (PROAMITINE) tablet 5 mg  5 mg Oral Q MON, WED & FRI

## 2017-10-31 NOTE — PROGRESS NOTES
[address]  217 [facility] 140 Jorge Valderrama, [address]  [phone]                GI PROGRESS NOTE  Philip Hugo, [facility]  Work Cell: [phone]      NAME:   Isidoro Tucker   :    1980   MRN:    [id number]     Assessment/Plan   1. GI bleed - with BRBPR in setting of recent constipation and straining and frequent NSAID use, s/p EGD/colonoscopy demonstrating esophagitis, erosive gastritis and duodenitis along with several rectal ulcers. Pathology pending. H.pylori (-). This is possibly related to NSAID use vs IBD. Hgb stable and she is without further evidence of bleeding.   - Diet as tolerated  - Continue PPI  - Avoid NSAIDs  - IBD panel pending   - Monitor H&H  2. ESRD - on PD  3. History of DVT - on Eliquis    Okay to discharge from GI standpoint to follow-up outpatient in 2-4 weeks      Patient Active Problem List   Diagnosis Code    Chest pain, unspecified R07.9    ESRD on peritoneal dialysis ([facility] [address] N18.6, Z99.2    UTI (lower urinary tract infection) N39.0    Hypotension, unspecified I95.9    Hypocalcemia E83.51    Leukocytosis D72.829    Shock ([facility] [address] R57.9    Syncope R55    Tachycardia R00.0    Hypotension I95.9    ESRD (end stage renal disease) ([facility]) N18.6    Bilateral hip pain M25.551, M25.552    Calciphylaxis cutis E83.59    Lower GI bleed K92.2       Subjective:     Feeling better. Denies any abdominal pain. States she has had non-bloody BM since colonoscopy. Tolerating diet without nausea or vomiting.        Review of Systems    Constitutional: negative fever, negative chills, negative weight loss  Eyes:   negative visual changes  ENT:   negative sore throat, tongue or lip swelling  Respiratory:  negative cough, negative dyspnea  Cards:  negative for chest pain, palpitations, lower extremity edema  GI:   See HPI  :  negative for frequency, dysuria  Integument:  negative for rash and pruritus  Heme:  negative for easy bruising and gum/nose bleeding  Musculoskel: negative for myalgias, back pain and muscle weakness  Neuro: negative for headaches, dizziness, vertigo  Psych:  negative for feelings of anxiety, depression     Objective:     VITALS:   Last 24hrs VS reviewed since prior hospitalist progress note. Most recent are:  Visit Vitals    BP 97/68    Pulse (!) 101    Temp 98.1 °F (36.7 °C)    Resp 18    Ht 5' 1\" (1.549 m)    Wt 115.5 kg (254 lb 10.1 oz)    SpO2 94%    BMI 48.11 kg/m2       Intake/Output Summary (Last 24 hours) at 10/31/17 1328  Last data filed at 10/31/17 0914   Gross per 24 hour   Intake             8100 ml   Output             9850 ml   Net            -1750 ml        PHYSICAL EXAM:  General   well developed, obese, alert, in no acute distress  EENT  Normocephalic, Atraumatic, PERRLA, EOMI, sclera clear  Respiratory   Clear To Auscultation bilaterally - no wheezes, rales, rhonchi, or crackles  Cardiology  Regular Rate and Rythmn  - no murmurs, rubs or gallops  Abdominal  Soft, non-tender, non-distended, positive bowel sounds, no hepatosplenomegaly, no palpable mass  Extremities  No clubbing, cyanosis, or edema. Pulses intact. Neurological  No focal neurological deficits noted  Psychological  Oriented x 3. Normal affect. Lab Data   Recent Results (from the past 12 hour(s))   CBC WITH AUTOMATED DIFF    Collection Time: 10/31/17  4:13 AM   Result Value Ref Range    WBC 7.0 3.6 - 11.0 K/uL    RBC 3.03 (L) 3.80 - 5.20 M/uL    HGB 8.4 (L) 11.5 - 16.0 g/dL    HCT 26.1 (L) 35.0 - 47.0 %    MCV 86.1 80.0 - 99.0 FL    MCH 27.7 26.0 - 34.0 PG    MCHC 32.2 30.0 - 36.5 g/dL    RDW 18.0 (H) 11.5 - 14.5 %    PLATELET 091 331 - 655 K/uL    NEUTROPHILS 79 (H) 32 - 75 %    BAND NEUTROPHILS 1 %    LYMPHOCYTES 10 (L) 12 - 49 %    MONOCYTES 8 5 - 13 %    EOSINOPHILS 1 0 - 7 %    BASOPHILS 0 0 - 1 %    METAMYELOCYTES 1 %    ABS. NEUTROPHILS 5.6 1.8 - 8.0 K/UL    ABS. LYMPHOCYTES 0.7 (L) 0.8 - 3.5 K/UL    ABS.  MONOCYTES 0.6 0.0 - 1.0 K/UL ABS. EOSINOPHILS 0.1 0.0 - 0.4 K/UL    ABS.  BASOPHILS 0.0 0.0 - 0.1 K/UL    DF MANUAL      RBC COMMENTS ANISOCYTOSIS  1+       RENAL FUNCTION PANEL    Collection Time: 10/31/17  4:13 AM   Result Value Ref Range    Sodium 142 136 - 145 mmol/L    Potassium 3.5 3.5 - 5.1 mmol/L    Chloride 89 (L) 97 - 108 mmol/L    CO2 18 (L) 21 - 32 mmol/L    Anion gap 35 (H) 5 - 15 mmol/L    Glucose 90 65 - 100 mg/dL    BUN 49 (H) 6 - 20 MG/DL    Creatinine 12.70 (H) 0.55 - 1.02 MG/DL    BUN/Creatinine ratio 4 (L) 12 - 20      GFR est AA 4 (L) >60 ml/min/1.73m2    GFR est non-AA 3 (L) >60 ml/min/1.73m2    Calcium 8.1 (L) 8.5 - 10.1 MG/DL    Phosphorus 6.5 (H) 2.6 - 4.7 MG/DL    Albumin 1.8 (L) 3.5 - 5.0 g/dL   MAGNESIUM    Collection Time: 10/31/17  4:13 AM   Result Value Ref Range    Magnesium 1.7 1.6 - 2.4 mg/dL         Medications: Reviewed    PMH/SH reviewed - no change compared to H&P  Mid-Level Provider: Lamont Menard NP   Date/Time:  10/31/2017

## 2017-10-31 NOTE — PROGRESS NOTES
Patient admitted with diagnosis of ESRD on peritoneal  Dialysis and lower GI Bleed. Patient admitted from home. Patient visited by Team 8 hospitalist Johnna Nava MD, Kavita Kingston RN and this CM. Patient has appt at wound clinic at [address] [place] [address]   Patient  is alert and oriented. No needs identified at this time. Patient discharge planned for later today.   MENDEZ Vega, CRM

## 2017-11-01 LAB
BAKER'S YEAST IGA QN: <20 UNITS (ref 0–24.9)
BAKER'S YEAST IGG QN: <20 UNITS (ref 0–24.9)
P-ANCA ATYPICAL TITR SER IF: NORMAL TITER

## 2019-04-28 NOTE — PROGRESS NOTES
42 [facility] arrived at 1515. Patient's 1400 P. Dialysis bag has been placed on KPAD at 1530. MD aware that 1400 was not to be given until warmed. Airway patent, Nasal mucosa clear. Mouth with normal mucosa. Throat has no vesicles, no oropharyngeal exudates and uvula is midline.

## 2020-09-29 ENCOUNTER — APPOINTMENT (OUTPATIENT)
Dept: VASCULAR SURGERY | Age: 40
End: 2020-09-29
Attending: EMERGENCY MEDICINE
Payer: MEDICARE

## 2020-09-29 ENCOUNTER — HOSPITAL ENCOUNTER (EMERGENCY)
Age: 40
Discharge: HOME | End: 2020-09-30
Attending: EMERGENCY MEDICINE
Payer: MEDICARE

## 2020-09-29 VITALS
HEIGHT: 61 IN | BODY MASS INDEX: 44.16 KG/M2 | DIASTOLIC BLOOD PRESSURE: 73 MMHG | RESPIRATION RATE: 16 BRPM | OXYGEN SATURATION: 96 % | SYSTOLIC BLOOD PRESSURE: 101 MMHG | HEART RATE: 89 BPM | TEMPERATURE: 97.4 F | WEIGHT: 233.91 LBS

## 2020-09-29 DIAGNOSIS — L03.116 CELLULITIS OF LEFT LOWER EXTREMITY: Primary | ICD-10-CM

## 2020-09-29 PROCEDURE — 99282 EMERGENCY DEPT VISIT SF MDM: CPT

## 2020-09-29 PROCEDURE — 93971 EXTREMITY STUDY: CPT

## 2020-09-29 RX ORDER — DOXYCYCLINE HYCLATE 100 MG
100 TABLET ORAL 2 TIMES DAILY
Qty: 14 TAB | Refills: 0 | Status: SHIPPED | OUTPATIENT
Start: 2020-09-29 | End: 2020-10-06

## 2020-09-30 NOTE — ED PROVIDER NOTES
Patient is a 40-year-old female history of chronic kidney disease s/p kidney transplant in  on at home peritoneal dialysis and prior thromboembolism on Eliquis presenting emergency department for evaluation of left leg pain and swelling with onset today. She notes that she noticed \"knots\" in her calf 3 days ago. She denies any known injury. She denies fever, chills, sweats, chest pain, shortness breath, abdominal pain, or any other complaints at this time. She notes that her last embolism was 1 month ago in her neck. Past Medical History:   Diagnosis Date    Chronic kidney disease     Dialysis patient ([facility] [address]     Seizures ([facility] [address]        Past Surgical History:   Procedure Laterality Date    BIOPSY      parathyroid    COLONOSCOPY N/A 10/30/2017    COLONOSCOPY performed by Rick Browne MD at [place] [address]       HX  SECTION      HX CHOLECYSTECTOMY      HX OTHER SURGICAL      fistula and PD port in stomach     HX TRANSPLANT  [facility]         History reviewed. No pertinent family history.     Social History     Socioeconomic History    Marital status: SINGLE     Spouse name: Not on file    Number of children: Not on file    Years of education: Not on file    Highest education level: Not on file   Occupational History    Not on file   Social Needs    Financial resource strain: Not on file    Food insecurity     Worry: Not on file     Inability: Not on file    Transportation needs     Medical: Not on file     Non-medical: Not on file   Tobacco Use    Smoking status: Never Smoker    Smokeless tobacco: Never Used   Substance and Sexual Activity    Alcohol use: No    Drug use: No    Sexual activity: Not on file   Lifestyle    Physical activity     Days per week: Not on file     Minutes per session: Not on file    Stress: Not on file   Relationships    Social connections     Talks on phone: Not on file     Gets together: Not on file     Attends [employer]ism service: Not on file     Active member of club or organization: Not on file     Attends meetings of clubs or organizations: Not on file     Relationship status: Not on file    Intimate partner violence     Fear of current or ex partner: Not on file     Emotionally abused: Not on file     Physically abused: Not on file     Forced sexual activity: Not on file   Other Topics Concern    Not on file   Social History Narrative    Not on file         ALLERGIES: Phoslo [calcium acetate]    Review of Systems   Constitutional: Negative for chills and fever. HENT: Negative for ear pain and sore throat. Eyes: Negative for visual disturbance. Respiratory: Negative for cough and shortness of breath. Cardiovascular: Negative for chest pain. Gastrointestinal: Negative for abdominal pain. Genitourinary: Negative for flank pain. Musculoskeletal: Positive for myalgias (left calf). Negative for back pain. Skin: Negative for color change. Neurological: Negative for dizziness and headaches. Psychiatric/Behavioral: Negative for confusion. Vitals:    09/29/20 2204   BP: 101/73   Pulse: 89   Resp: 16   Temp: 97.4 °F (36.3 °C)   SpO2: 96%   Weight: 106.1 kg (233 lb 14.5 oz)   Height: 5' 1\" (1.549 m)            Physical Exam  Vitals signs and nursing note reviewed. Constitutional:       General: She is not in acute distress. Appearance: Normal appearance. She is not ill-appearing. HENT:      Head: Normocephalic and atraumatic. Mouth/Throat:      Pharynx: Oropharynx is clear. Eyes:      Extraocular Movements: Extraocular movements intact. Conjunctiva/sclera: Conjunctivae normal.   Neck:      Musculoskeletal: No neck rigidity. Cardiovascular:      Rate and Rhythm: Normal rate and regular rhythm. Pulmonary:      Effort: Pulmonary effort is normal.      Breath sounds: Normal breath sounds. Abdominal:      Palpations: Abdomen is soft. Tenderness:  There is no abdominal tenderness. Musculoskeletal: Normal range of motion. Left lower leg: Edema present. Skin:     General: Skin is warm and dry. Neurological:      General: No focal deficit present. Mental Status: She is alert and oriented to person, place, and time. Psychiatric:         Mood and Affect: Mood normal.          MDM  Number of Diagnoses or Management Options  Cellulitis of left lower extremity:   Diagnosis management comments: Patient is alert, appearing, afebrile, vitals stable. She has had a prior kidney transplant and is on at home peritoneal dialysis. Prior thromboembolism and is on chronic Eliquis. She appears to have erythema, edema, warmth, and pain to the left lower leg. No chest pain or shortness of breath. Lungs are clear to auscultation bilaterally. Duplex LE ultrasound negative for acute DVT. Signs and symptoms consistent with cellulitis. Unable to obtain venous access for blood work, which with reassuring patient clinical status, stable vitals, and negative DVT study will defer at this time. Pt to be discharged home with antibiotics and follow-up with PCP. Strict return precautions outlined to return to ED with worsening pain, redness, fever, or any other severe symptoms. All questions answered at this time. Discussed patient with ED attending Austin Mccall DO who agrees with current management plan. Amount and/or Complexity of Data Reviewed  Clinical lab tests: reviewed  Tests in the radiology section of CPT®: reviewed  Tests in the medicine section of CPT®: reviewed  Discuss the patient with other providers: yes (ED attending Dr. Kris Hawthorne )      ED Course as of Sep 29 2329   Tue Sep 29, 2020   2307 Right Lower Venous     For comparison purposes, the right common femoral vein was briefly interrogated. The vein demonstrates normal color filling and compressibility. Doppler flow was phasic and spontaneous.    Left Lower Venous     The common femoral, great saphenous, saphenous femoral junction, profunda femoral, proximal femoral, mid femoral, distal femoral, popliteal, posterior tibial and peroneal vein(s) were imaged in the transverse and longitudinal planes. The vessels showed normal color filling and compressibility. Doppler interrogation of the veins showed phasic and spontaneous flow. [EH]      ED Course User Index  [EH] DARIN Wayne     11:31 PM  Pt has been reevaluated. There are no new complaints, changes, or physical findings at this time. Medications have been reviewed w/ pt and/or family. Pt and/or family's questions have been answered. Pt and/or family expressed good understanding of the dx/tx/rx and is in agreement with plan of care. Pt instructed and agreed to f/u w/ PCP and to return to ED upon further deterioration. Pt is ready for discharge. IMPRESSION:  1. Cellulitis of left lower extremity        PLAN:  1. Current Discharge Medication List      START taking these medications    Details   doxycycline (VIBRA-TABS) 100 mg tablet Take 1 Tab by mouth two (2) times a day for 7 days. Qty: 14 Tab, Refills: 0           2.    Follow-up Information     Follow up With Specialties Details Why Contact Info    Andrew Bar MD Family Medicine Schedule an appointment as soon as possible for a visit   [address] [phone]      [facility] [address], [facility] Emergency Medicine Go to  If symptoms worsen [address] [facility]  [phone]            Return to ED if worse     Procedures

## 2020-09-30 NOTE — ED NOTES
Attempted IV x 1 without success. Looked with US, nothing that will hold an IV was visualized. Pt to Vascular via WC.   Will speak with provider about need for IV vs arterial stick for labs

## 2020-09-30 NOTE — ED TRIAGE NOTES
Arrives with c/o of \"knots in my leg calf and upper leg\". Noticed LEFT calf swelling today. Pain with ambulation. Denies any long car rides but states she is home all day and only moves around in her house. Denies sob/eliquis. Concerned for DVT. Takes eliquis 2.5mg BID, last dose 2100    Hx of PE and DVT.  Dialysis pt, does daily peritoneal dialysis

## 2021-01-20 NOTE — PROGRESS NOTES
Problem: Falls - Risk of  Goal: *Absence of Falls  Document Catie Fall Risk and appropriate interventions in the flowsheet.    Outcome: Progressing Towards Goal  Fall Risk Interventions:            Medication Interventions: Patient to call before getting OOB yes

## 2021-02-19 NOTE — ED NOTES
Connected patient to cardiac monitor. Plan: Wash affected areas daily with Hibiclens\\nAvoid picking scabs off Detail Level: Detailed Initiate Treatment: Bleach baths

## 2021-05-11 NOTE — PROGRESS NOTES
CG Piper contacted and informed of this recommendation. New orders to be sent to [facility] pharmacy and written MD orders faxed to hospitals at [phone], upon receiving verbal approval from guardian.  plans to try emailing guardian now in an effort to begin this regimen tonight.   PICC order received. Chart reviewed and patient is noted to have ESRD. Discussed with Yahaira Ryan RN that patient is not a likely candidate for a PICC procedure. She will call nephrologist to get clarification.

## 2022-03-18 PROBLEM — E83.59 CALCIPHYLAXIS CUTIS: Status: ACTIVE | Noted: 2017-10-27

## 2022-03-18 PROBLEM — K92.2 LOWER GI BLEED: Status: ACTIVE | Noted: 2017-10-27

## 2022-03-18 PROBLEM — M25.552 BILATERAL HIP PAIN: Status: ACTIVE | Noted: 2017-08-09

## 2022-03-18 PROBLEM — M25.551 BILATERAL HIP PAIN: Status: ACTIVE | Noted: 2017-08-09

## 2022-03-18 PROBLEM — R55 SYNCOPE: Status: ACTIVE | Noted: 2017-08-09

## 2022-03-18 PROBLEM — R00.0 TACHYCARDIA: Status: ACTIVE | Noted: 2017-08-09

## 2022-03-19 PROBLEM — E83.51 HYPOCALCEMIA: Status: ACTIVE | Noted: 2017-05-08

## 2022-03-19 PROBLEM — I95.9 HYPOTENSION: Status: ACTIVE | Noted: 2017-08-09

## 2022-03-19 PROBLEM — D72.829 LEUKOCYTOSIS: Status: ACTIVE | Noted: 2017-08-09

## 2022-03-19 PROBLEM — R57.9 SHOCK (HCC): Status: ACTIVE | Noted: 2017-08-09

## 2022-03-19 PROBLEM — N18.6 ESRD (END STAGE RENAL DISEASE) (HCC): Status: ACTIVE | Noted: 2017-08-09

## 2022-12-14 ENCOUNTER — HOSPITAL ENCOUNTER (EMERGENCY)
Age: 42
Discharge: HOME | End: 2022-12-15
Attending: STUDENT IN AN ORGANIZED HEALTH CARE EDUCATION/TRAINING PROGRAM
Payer: MEDICARE

## 2022-12-14 VITALS
SYSTOLIC BLOOD PRESSURE: 137 MMHG | RESPIRATION RATE: 16 BRPM | TEMPERATURE: 98.3 F | HEART RATE: 104 BPM | DIASTOLIC BLOOD PRESSURE: 92 MMHG | OXYGEN SATURATION: 98 %

## 2022-12-14 DIAGNOSIS — R09.89 SUSPECTED NOVEL INFLUENZA A VIRUS INFECTION: Primary | ICD-10-CM

## 2022-12-14 DIAGNOSIS — R52 BODY ACHES: ICD-10-CM

## 2022-12-14 LAB
ALBUMIN SERPL-MCNC: 3.2 G/DL (ref 3.5–5)
ALBUMIN/GLOB SERPL: 0.7 {RATIO} (ref 1.1–2.2)
ALP SERPL-CCNC: 185 U/L (ref 45–117)
ALT SERPL-CCNC: 36 U/L (ref 12–78)
ANION GAP SERPL CALC-SCNC: 10 MMOL/L (ref 5–15)
AST SERPL-CCNC: 35 U/L (ref 15–37)
BASOPHILS # BLD: 0 K/UL (ref 0–0.1)
BASOPHILS NFR BLD: 1 % (ref 0–1)
BILIRUB SERPL-MCNC: 0.6 MG/DL (ref 0.2–1)
BUN SERPL-MCNC: 47 MG/DL (ref 6–20)
BUN/CREAT SERPL: 5 (ref 12–20)
CALCIUM SERPL-MCNC: 9.3 MG/DL (ref 8.5–10.1)
CHLORIDE SERPL-SCNC: 93 MMOL/L (ref 97–108)
CO2 SERPL-SCNC: 32 MMOL/L (ref 21–32)
COMMENT, HOLDF: NORMAL
CREAT SERPL-MCNC: 10.1 MG/DL (ref 0.55–1.02)
DIFFERENTIAL METHOD BLD: ABNORMAL
EOSINOPHIL # BLD: 0 K/UL (ref 0–0.4)
EOSINOPHIL NFR BLD: 0 % (ref 0–7)
ERYTHROCYTE [DISTWIDTH] IN BLOOD BY AUTOMATED COUNT: 14.7 % (ref 11.5–14.5)
FLUAV AG NPH QL IA: NEGATIVE
FLUBV AG NOSE QL IA: NEGATIVE
GLOBULIN SER CALC-MCNC: 4.4 G/DL (ref 2–4)
GLUCOSE SERPL-MCNC: 95 MG/DL (ref 65–100)
HCT VFR BLD AUTO: 31.1 % (ref 35–47)
HGB BLD-MCNC: 9.4 G/DL (ref 11.5–16)
IMM GRANULOCYTES # BLD AUTO: 0 K/UL (ref 0–0.04)
IMM GRANULOCYTES NFR BLD AUTO: 0 % (ref 0–0.5)
LYMPHOCYTES # BLD: 0.8 K/UL (ref 0.8–3.5)
LYMPHOCYTES NFR BLD: 11 % (ref 12–49)
MCH RBC QN AUTO: 28.6 PG (ref 26–34)
MCHC RBC AUTO-ENTMCNC: 30.2 G/DL (ref 30–36.5)
MCV RBC AUTO: 94.5 FL (ref 80–99)
MONOCYTES # BLD: 0.6 K/UL (ref 0–1)
MONOCYTES NFR BLD: 8 % (ref 5–13)
NEUTS SEG # BLD: 5.8 K/UL (ref 1.8–8)
NEUTS SEG NFR BLD: 79 % (ref 32–75)
NRBC # BLD: 0 K/UL (ref 0–0.01)
NRBC BLD-RTO: 0 PER 100 WBC
PLATELET # BLD AUTO: 148 K/UL (ref 150–400)
PMV BLD AUTO: 11.2 FL (ref 8.9–12.9)
POTASSIUM SERPL-SCNC: 3 MMOL/L (ref 3.5–5.1)
PROT SERPL-MCNC: 7.6 G/DL (ref 6.4–8.2)
RBC # BLD AUTO: 3.29 M/UL (ref 3.8–5.2)
SAMPLES BEING HELD,HOLD: NORMAL
SODIUM SERPL-SCNC: 135 MMOL/L (ref 136–145)
WBC # BLD AUTO: 7.3 K/UL (ref 3.6–11)

## 2022-12-14 PROCEDURE — 87804 INFLUENZA ASSAY W/OPTIC: CPT

## 2022-12-14 PROCEDURE — 74011250636 HC RX REV CODE- 250/636: Performed by: STUDENT IN AN ORGANIZED HEALTH CARE EDUCATION/TRAINING PROGRAM

## 2022-12-14 PROCEDURE — 93005 ELECTROCARDIOGRAM TRACING: CPT

## 2022-12-14 PROCEDURE — 84484 ASSAY OF TROPONIN QUANT: CPT

## 2022-12-14 PROCEDURE — 85025 COMPLETE CBC W/AUTO DIFF WBC: CPT

## 2022-12-14 PROCEDURE — 99284 EMERGENCY DEPT VISIT MOD MDM: CPT

## 2022-12-14 PROCEDURE — 36415 COLL VENOUS BLD VENIPUNCTURE: CPT

## 2022-12-14 PROCEDURE — 80053 COMPREHEN METABOLIC PANEL: CPT

## 2022-12-14 RX ADMIN — SODIUM CHLORIDE 1000 ML: 900 INJECTION, SOLUTION INTRAVENOUS at 23:23

## 2022-12-15 LAB
ATRIAL RATE: 100 BPM
CALCULATED P AXIS, ECG09: 46 DEGREES
CALCULATED R AXIS, ECG10: 21 DEGREES
CALCULATED T AXIS, ECG11: 95 DEGREES
DIAGNOSIS, 93000: NORMAL
P-R INTERVAL, ECG05: 162 MS
Q-T INTERVAL, ECG07: 340 MS
QRS DURATION, ECG06: 76 MS
QTC CALCULATION (BEZET), ECG08: 438 MS
SARS-COV-2, COV2: NORMAL
SARS-COV-2, XPLCVT: NOT DETECTED
SOURCE, COVRS: NORMAL
TROPONIN-HIGH SENSITIVITY: 28 NG/L (ref 0–51)
VENTRICULAR RATE, ECG03: 100 BPM

## 2022-12-15 PROCEDURE — U0005 INFEC AGEN DETEC AMPLI PROBE: HCPCS

## 2022-12-15 NOTE — ED PROVIDER NOTES
This is a 41-year-old female with a history of chronic kidney disease, dialysis, seizure disorder who presents ED for evaluation fevers, chills, body aches and vomiting for the past 2 days. Denies any abdominal pain. She denies any chest pain or shortness of breath. Generalized Body Aches  Pertinent negatives include no chest pain, no headaches and no shortness of breath. Past Medical History:   Diagnosis Date    Chronic kidney disease     Dialysis patient ([facility] [address]     Seizures ([facility] [address]        Past Surgical History:   Procedure Laterality Date    BIOPSY      parathyroid    COLONOSCOPY N/A 10/30/2017    COLONOSCOPY performed by Amish Valladares MD at [address]       HX  SECTION      HX CHOLECYSTECTOMY      HX OTHER SURGICAL      fistula and PD port in stomach     HX TRANSPLANT  [facility]    [facility]ney         No family history on file. Social History     Socioeconomic History    Marital status: SINGLE     Spouse name: Not on file    Number of children: Not on file    Years of education: Not on file    Highest education level: Not on file   Occupational History    Not on file   Tobacco Use    Smoking status: Never    Smokeless tobacco: Never   Substance and Sexual Activity    Alcohol use: No    Drug use: No    Sexual activity: Not on file   Other Topics Concern    Not on file   Social History Narrative    Not on file     Social Determinants of Health     Financial Resource Strain: Not on file   Food Insecurity: Not on file   Transportation Needs: Not on file   Physical Activity: Not on file   Stress: Not on file   Social Connections: Not on file   Intimate Partner Violence: Not on file   Housing Stability: Not on file         ALLERGIES: Phoslo [calcium acetate]    Review of Systems   Constitutional:  Positive for chills and fever. Respiratory:  Negative for shortness of breath. Cardiovascular:  Negative for chest pain. Musculoskeletal:  Positive for myalgias.    Neurological: Negative for headaches. All other systems reviewed and are negative. Vitals:    12/14/22 2123   BP: (!) 137/92   Pulse: (!) 104   Resp: 16   Temp: 98.3 °F (36.8 °C)   SpO2: 98%            Physical Exam  Vitals and nursing note reviewed. Constitutional:       General: She is not in acute distress. Appearance: Normal appearance. She is not ill-appearing. HENT:      Head: Normocephalic and atraumatic. Right Ear: External ear normal.      Left Ear: External ear normal.      Nose: Nose normal.      Mouth/Throat:      Mouth: Mucous membranes are moist.      Pharynx: Oropharynx is clear. Eyes:      Extraocular Movements: Extraocular movements intact. Conjunctiva/sclera: Conjunctivae normal.   Cardiovascular:      Rate and Rhythm: Normal rate and regular rhythm. Pulses: Normal pulses. Heart sounds: Normal heart sounds. Comments: Dialysis catheter present in the right chest  Pulmonary:      Effort: Pulmonary effort is normal. No respiratory distress. Breath sounds: Normal breath sounds. No wheezing. Abdominal:      General: Abdomen is flat. Bowel sounds are normal.      Palpations: Abdomen is soft. Tenderness: There is no abdominal tenderness. There is no guarding. Genitourinary:     Comments: Deferred  Musculoskeletal:         General: No swelling. Normal range of motion. Cervical back: Normal range of motion. Skin:     General: Skin is warm and dry. Capillary Refill: Capillary refill takes less than 2 seconds. Findings: No rash. Neurological:      General: No focal deficit present. Mental Status: She is alert and oriented to person, place, and time.       Comments: Moving all extremities   Psychiatric:         Mood and Affect: Mood normal.         Behavior: Behavior normal.      Comments: Has decision making capacity        MDM  Number of Diagnoses or Management Options  Body aches  Suspected novel influenza A virus infection  Diagnosis management comments:  Differential includes but limited to pneumonia, URI, COVID-19, ACS. Patient was signed out to Dr. Kelly Centeno at [facility]. ED Course as of 12/14/22 2320   Wed Dec 14, 2022   2317 EG performed at 2249 shows normal sinus rhythm, ventricular rate of 100.   Normal intervals, nonspecific T wave abnormality. [WG]      ED Course User Index  [WG] Lanre Zamora,        Procedures

## 2022-12-15 NOTE — ED TRIAGE NOTES
Triage: Pt arrives ambulatory from home with fever, chills, body aches, and vomiting. She has had these symptoms for 2 days. Last had dialysis Tuesday.

## 2024-05-19 NOTE — PROGRESS NOTES
NAME: Suzanne Canavan        :  1980        MRN:  [id number]        Assessment :    Plan:  --[id number]  Anemia  GI bleed  Hypokalemia  Calciphylaxis --Continue with current PD. Endoscopy showing antral erosion, esophagitis, rectal ulcers>>PPI    Replete KCL    Continue phos binder. Continue EPO. On Na thiosulfate 3x/week       Subjective:     Chief Complaint:  Seen in AM after returning from endoscopy. C/o r thigh and L hip pain  No n/v/abd pain    Review of Systems:  As above    Objective:     VITALS:   Last 24hrs VS reviewed since prior progress note. Most recent are:  Visit Vitals    /72 (BP 1 Location: Right arm, BP Patient Position: At rest)    Pulse 92    Temp 98.5 °F (36.9 °C)    Resp 18    Ht 5' 1\" (1.549 m)    Wt 113.1 kg (249 lb 5.4 oz)    SpO2 95%    BMI 47.11 kg/m2       Intake/Output Summary (Last 24 hours) at 10/30/17 2113  Last data filed at 10/30/17 2029   Gross per 24 hour   Intake             8250 ml   Output             7550 ml   Net              700 ml      Telemetry Reviewed:     PHYSICAL EXAM:  General: NAD  CTA  Abd soft  No edema      Lab Data Reviewed: (see below)    Medications Reviewed: (see below)    PMH/SH reviewed - no change compared to H&P  ________________________________________________________________________  Care Plan discussed with:  Patient y    Family  y    RN     Care Manager                    Consultant:          Comments   >50% of visit spent in counseling and coordination of care       ________________________________________________________________________  Malik Smith MD     Procedures: see electronic medical records for all procedures/Xrays and details which  were not copied into this note but were reviewed prior to creation of Plan.       LABS:  Recent Labs      10/30/17   0427  10/29/17   0333   WBC  6.9  7.2   HGB  8.8*  8.8*   HCT  27.4*  27.1* PLT  310  315     Recent Labs      10/30/17   0427  10/29/17   0333  10/28/17   0336   NA  137  139  137   K  3.3*  3.4*  3.1*   CL  89*  89*  90*   CO2  19*  19*  20*   BUN  51*  55*  57*   CREA  12.90*  13.20*  13.10*   GLU  83  87  73   CA  8.3*  7.9*  7.7*   MG  1.6  1.6   --    PHOS  6.9*  7.7*   --      Recent Labs      10/30/17   0427  10/29/17   0333   SGOT   --   21   AP   --   124*   TP   --   7.3   ALB  2.0*  1.9*   GLOB   --   5.4*     No results for input(s): INR, PTP, APTT in the last 72 hours. No lab exists for component: INREXT, INREXT   Recent Labs      10/28/17   0336   TIBC  129*   PSAT  49   FERR  764*      Lab Results   Component Value Date/Time    Folate 4.2 10/28/2017 03:36 AM      No results for input(s): PH, PCO2, PO2 in the last 72 hours. No results for input(s): CPK, CKMB in the last 72 hours.     No lab exists for component: TROPONINI  No components found for: [place]  Lab Results   Component Value Date/Time    Color YELLOW 08/11/2012 06:55 AM    Appearance CLOUDY 08/11/2012 06:55 AM    Specific gravity 1.025 08/11/2012 06:55 AM    pH (UA) 6.0 08/11/2012 06:55 AM    Protein 100 08/11/2012 06:55 AM    Glucose NEGATIVE  08/11/2012 06:55 AM    Ketone NEGATIVE  08/11/2012 06:55 AM    Urobilinogen 0.2 08/11/2012 06:55 AM    Nitrites NEGATIVE  08/11/2012 06:55 AM    Leukocyte Esterase TRACE 08/11/2012 06:55 AM    Epithelial cells 20-50 08/11/2012 06:55 AM    Bacteria 4+ 08/11/2012 06:55 AM    WBC 20-50 08/11/2012 06:55 AM    RBC 5-10 08/11/2012 06:55 AM       MEDICATIONS:  Current Facility-Administered Medications   Medication Dose Route Frequency    sodium hypochlorite (QUARTER STRENGTH DAKIN'S) 0.125% irrigation (bottle)   Topical BID    apixaban (ELIQUIS) tablet 2.5 mg  2.5 mg Oral BID    pantoprazole (PROTONIX) tablet 40 mg  40 mg Oral ACB&D    cyanocobalamin (VITAMIN B12) tablet 500 mcg  500 mcg Oral DAILY    folic acid (FOLVITE) tablet 1 mg  1 mg Oral DAILY    0.9% sodium [phone] chloride infusion 250 mL  250 mL IntraVENous PRN    sodium chloride (NS) flush 20 mL  20 mL InterCATHeter PRN    sodium chloride (NS) flush 10 mL  10 mL InterCATHeter Q24H    sodium chloride (NS) flush 10 mL  10 mL InterCATHeter PRN    sodium chloride (NS) flush 10 mL  10 mL InterCATHeter Q8H    alteplase (CATHFLO) 1 mg in sterile water (preservative free) 1 mL injection  1 mg InterCATHeter PRN    bacitracin 500 unit/gram packet 1 Packet  1 Packet Topical PRN    ondansetron (ZOFRAN) injection 4 mg  4 mg IntraVENous Q4H PRN    sodium thiosulfate 25 g in 100 mL infusion   IntraVENous DIALYSIS MON, WED & FRI    prochlorperazine (COMPAZINE) with saline injection 10 mg  10 mg IntraVENous Q6H PRN    epoetin haylie (EPOGEN;PROCRIT) injection 8,000 Units  8,000 Units SubCUTAneous DIALYSIS TUE, THU & SAT    peritoneal dialysis DEXTROSE 2.5% (2.5 mEq/L low calcium) solution 2,000 mL  2,000 mL IntraPERitoneal QID    levETIRAcetam (KEPPRA) tablet 1,000 mg  1,000 mg Oral QHS    sevelamer carbonate (RENVELA) tab 1,600 mg  1,600 mg Oral TID    sodium chloride (NS) flush 5-10 mL  5-10 mL IntraVENous Q8H    sodium chloride (NS) flush 5-10 mL  5-10 mL IntraVENous PRN    HYDROcodone-acetaminophen (NORCO) 5-325 mg per tablet 1 Tab  1 Tab Oral Q4H PRN    morphine injection 5 mg  5 mg IntraVENous Q4H PRN    hydrocortisone (ANUSOL-HC) 2.5 % rectal cream   PeriANAL BID    midodrine (PROAMITINE) tablet 5 mg  5 mg Oral Q MON, WED & FRI

## (undated) DEVICE — 1200 GUARD II KIT W/5MM TUBE W/O VAC TUBE: Brand: GUARDIAN

## (undated) DEVICE — KIT IV STRT W CHLORAPREP PD 1ML

## (undated) DEVICE — SYRINGE MED 20ML STD CLR PLAS LUERLOCK TIP N CTRL DISP

## (undated) DEVICE — FORCEPS BX L240CM JAW DIA2.8MM L CAP W/ NDL MIC MESH TOOTH

## (undated) DEVICE — Z DISCONTINUED USE 2751540 TUBING IRRIG L10IN DISP PMP ENDOGATOR

## (undated) DEVICE — CONNECTOR TBNG AUX H2O JET DISP FOR OLY 160/180 SER

## (undated) DEVICE — ENDO CARRY-ON PROCEDURE KIT INCLUDES ENZYMATIC SPONGE, GAUZE, BIOHAZARD LABEL, TRAY, LUBRICANT, DIRTY SCOPE LABEL, WATER LABEL, TRAY, DRAWSTRING PAD, AND DEFENDO 4-PIECE KIT.: Brand: ENDO CARRY-ON PROCEDURE KIT

## (undated) DEVICE — AIRLIFE™ U/CONNECT-IT OXYGEN TUBING 7 FEET (2.1 M) CRUSH-RESISTANT OXYGEN TUBING, VINYL TIPPED: Brand: AIRLIFE™

## (undated) DEVICE — SOLIDIFIER FLUID 3000 CC ABSORB

## (undated) DEVICE — CATH IV AUTOGRD BC BLU 22GA 25 -- INSYTE

## (undated) DEVICE — QUILTED PREMIUM COMFORT UNDERPAD,EXTRA HEAVY: Brand: WINGS

## (undated) DEVICE — BW-412T DISP COMBO CLEANING BRUSH: Brand: SINGLE USE COMBINATION CLEANING BRUSH

## (undated) DEVICE — CONTAINER SPEC 20 ML LID NEUT BUFF FORMALIN 10 % POLYPR STS

## (undated) DEVICE — SET ADMIN 16ML TBNG L100IN 2 Y INJ SITE IV PIGGY BK DISP

## (undated) DEVICE — SET EXTN TBNG L BOR 4 W STPCOCK ST 32IN PRIMING VOL 6ML

## (undated) DEVICE — BAG BELONG PT PERS CLEAR HANDL

## (undated) DEVICE — CANN NASAL O2 CAPNOGRAPHY AD -- FILTERLINE

## (undated) DEVICE — BAG SPEC BIOHZD LF 2MIL 6X10IN -- CONVERT TO ITEM 357326

## (undated) DEVICE — NEEDLE HYPO 18GA L1.5IN PNK S STL HUB POLYPR SHLD REG BVL

## (undated) DEVICE — KENDALL RADIOLUCENT FOAM MONITORING ELECTRODE -RECTANGULAR SHAPE: Brand: KENDALL